# Patient Record
Sex: FEMALE | Race: WHITE | NOT HISPANIC OR LATINO | URBAN - METROPOLITAN AREA
[De-identification: names, ages, dates, MRNs, and addresses within clinical notes are randomized per-mention and may not be internally consistent; named-entity substitution may affect disease eponyms.]

---

## 2020-05-23 LAB
ALBUMIN SERPL-MCNC: 4.8 G/DL (ref 3.8–4.9)
ALBUMIN/GLOB SERPL: 2.1 {RATIO} (ref 1.2–2.2)
ALP SERPL-CCNC: 73 IU/L (ref 39–117)
ALT SERPL-CCNC: 19 IU/L (ref 0–32)
AST SERPL-CCNC: 19 IU/L (ref 0–40)
BILIRUB SERPL-MCNC: 0.3 MG/DL (ref 0–1.2)
BUN SERPL-MCNC: 10 MG/DL (ref 6–24)
BUN/CREAT SERPL: 14 (ref 9–23)
CALCIUM SERPL-MCNC: 9.3 MG/DL (ref 8.7–10.2)
CHLORIDE SERPL-SCNC: 101 MMOL/L (ref 96–106)
CHOLEST SERPL-MCNC: 167 MG/DL (ref 100–199)
CO2 SERPL-SCNC: 26 MMOL/L (ref 20–29)
CREAT SERPL-MCNC: 0.72 MG/DL (ref 0.57–1)
GLOBULIN SER-MCNC: 2.3 G/DL (ref 1.5–4.5)
GLUCOSE SERPL-MCNC: 96 MG/DL (ref 65–99)
HBA1C MFR BLD: 5.5 % (ref 4.8–5.6)
HBV SURFACE AB SER-ACNC: 4.4 MIU/ML
HDLC SERPL-MCNC: 63 MG/DL
LDLC SERPL CALC-MCNC: 87 MG/DL (ref 0–99)
POTASSIUM SERPL-SCNC: 4 MMOL/L (ref 3.5–5.2)
PROT SERPL-MCNC: 7.1 G/DL (ref 6–8.5)
SL AMB EGFR AFRICAN AMERICAN: 108 ML/MIN/1.73
SL AMB EGFR NON AFRICAN AMERICAN: 94 ML/MIN/1.73
SL AMB VLDL CHOLESTEROL CALC: 17 MG/DL (ref 5–40)
SODIUM SERPL-SCNC: 144 MMOL/L (ref 134–144)
TRIGL SERPL-MCNC: 84 MG/DL (ref 0–149)

## 2020-05-27 ENCOUNTER — TELEMEDICINE (OUTPATIENT)
Dept: FAMILY MEDICINE CLINIC | Facility: CLINIC | Age: 55
End: 2020-05-27
Payer: COMMERCIAL

## 2020-05-27 DIAGNOSIS — E55.9 VITAMIN D DEFICIENCY: ICD-10-CM

## 2020-05-27 DIAGNOSIS — R73.01 IMPAIRED FASTING GLUCOSE: ICD-10-CM

## 2020-05-27 DIAGNOSIS — Z28.39 IMMUNIZATION DEFICIENCY: ICD-10-CM

## 2020-05-27 DIAGNOSIS — E78.2 MIXED HYPERLIPIDEMIA: Primary | ICD-10-CM

## 2020-05-27 PROBLEM — Z92.89 HISTORY OF MAMMOGRAM: Status: ACTIVE | Noted: 2018-02-01

## 2020-05-27 PROCEDURE — 99212 OFFICE O/P EST SF 10 MIN: CPT | Performed by: FAMILY MEDICINE

## 2020-05-27 RX ORDER — ROSUVASTATIN CALCIUM 20 MG/1
10 TABLET, COATED ORAL
COMMUNITY
Start: 2020-03-30 | End: 2021-01-11

## 2020-11-12 LAB
25(OH)D3+25(OH)D2 SERPL-MCNC: 40.3 NG/ML (ref 30–100)
ALBUMIN SERPL-MCNC: 4.7 G/DL (ref 3.8–4.9)
ALBUMIN/GLOB SERPL: 2 {RATIO} (ref 1.2–2.2)
ALP SERPL-CCNC: 78 IU/L (ref 39–117)
ALT SERPL-CCNC: 15 IU/L (ref 0–32)
AST SERPL-CCNC: 17 IU/L (ref 0–40)
BASOPHILS # BLD AUTO: 0 X10E3/UL (ref 0–0.2)
BASOPHILS NFR BLD AUTO: 0 %
BILIRUB SERPL-MCNC: 0.3 MG/DL (ref 0–1.2)
BUN SERPL-MCNC: 13 MG/DL (ref 6–24)
BUN/CREAT SERPL: 20 (ref 9–23)
CALCIUM SERPL-MCNC: 9.4 MG/DL (ref 8.7–10.2)
CHLORIDE SERPL-SCNC: 102 MMOL/L (ref 96–106)
CHOLEST SERPL-MCNC: 160 MG/DL (ref 100–199)
CO2 SERPL-SCNC: 26 MMOL/L (ref 20–29)
CREAT SERPL-MCNC: 0.66 MG/DL (ref 0.57–1)
EOSINOPHIL # BLD AUTO: 0.1 X10E3/UL (ref 0–0.4)
EOSINOPHIL NFR BLD AUTO: 1 %
ERYTHROCYTE [DISTWIDTH] IN BLOOD BY AUTOMATED COUNT: 12.5 % (ref 11.7–15.4)
EST. AVERAGE GLUCOSE BLD GHB EST-MCNC: 120 MG/DL
GLOBULIN SER-MCNC: 2.3 G/DL (ref 1.5–4.5)
GLUCOSE SERPL-MCNC: 92 MG/DL (ref 65–99)
HBA1C MFR BLD: 5.8 % (ref 4.8–5.6)
HCT VFR BLD AUTO: 39 % (ref 34–46.6)
HDLC SERPL-MCNC: 60 MG/DL
HGB BLD-MCNC: 13.1 G/DL (ref 11.1–15.9)
IMM GRANULOCYTES # BLD: 0 X10E3/UL (ref 0–0.1)
IMM GRANULOCYTES NFR BLD: 0 %
LDLC SERPL CALC-MCNC: 85 MG/DL (ref 0–99)
LYMPHOCYTES # BLD AUTO: 2.4 X10E3/UL (ref 0.7–3.1)
LYMPHOCYTES NFR BLD AUTO: 33 %
MCH RBC QN AUTO: 30.1 PG (ref 26.6–33)
MCHC RBC AUTO-ENTMCNC: 33.6 G/DL (ref 31.5–35.7)
MCV RBC AUTO: 90 FL (ref 79–97)
MONOCYTES # BLD AUTO: 0.4 X10E3/UL (ref 0.1–0.9)
MONOCYTES NFR BLD AUTO: 5 %
NEUTROPHILS # BLD AUTO: 4.4 X10E3/UL (ref 1.4–7)
NEUTROPHILS NFR BLD AUTO: 61 %
PLATELET # BLD AUTO: 168 X10E3/UL (ref 150–450)
POTASSIUM SERPL-SCNC: 4.2 MMOL/L (ref 3.5–5.2)
PROT SERPL-MCNC: 7 G/DL (ref 6–8.5)
RBC # BLD AUTO: 4.35 X10E6/UL (ref 3.77–5.28)
SL AMB EGFR AFRICAN AMERICAN: 113 ML/MIN/1.73
SL AMB EGFR NON AFRICAN AMERICAN: 98 ML/MIN/1.73
SODIUM SERPL-SCNC: 141 MMOL/L (ref 134–144)
TRIGL SERPL-MCNC: 81 MG/DL (ref 0–149)
TSH SERPL DL<=0.005 MIU/L-ACNC: 2.12 UIU/ML (ref 0.45–4.5)
WBC # BLD AUTO: 7.2 X10E3/UL (ref 3.4–10.8)

## 2020-11-23 ENCOUNTER — TELEMEDICINE (OUTPATIENT)
Dept: FAMILY MEDICINE CLINIC | Facility: CLINIC | Age: 55
End: 2020-11-23
Payer: COMMERCIAL

## 2020-11-23 VITALS — WEIGHT: 156 LBS | HEIGHT: 65 IN | BODY MASS INDEX: 25.99 KG/M2

## 2020-11-23 DIAGNOSIS — R73.01 IMPAIRED FASTING GLUCOSE: Primary | ICD-10-CM

## 2020-11-23 DIAGNOSIS — R73.03 PREDIABETES: ICD-10-CM

## 2020-11-23 DIAGNOSIS — Z12.31 ENCOUNTER FOR SCREENING MAMMOGRAM FOR BREAST CANCER: ICD-10-CM

## 2020-11-23 DIAGNOSIS — Z11.59 NEED FOR HEPATITIS C SCREENING TEST: ICD-10-CM

## 2020-11-23 DIAGNOSIS — E53.8 CYANOCOBALAMIN DEFICIENCY: ICD-10-CM

## 2020-11-23 DIAGNOSIS — E78.2 MIXED HYPERLIPIDEMIA: ICD-10-CM

## 2020-11-23 DIAGNOSIS — Z11.4 SCREENING FOR HIV (HUMAN IMMUNODEFICIENCY VIRUS): ICD-10-CM

## 2020-11-23 DIAGNOSIS — E55.9 VITAMIN D DEFICIENCY: ICD-10-CM

## 2020-11-23 PROCEDURE — 1036F TOBACCO NON-USER: CPT | Performed by: FAMILY MEDICINE

## 2020-11-23 PROCEDURE — 3008F BODY MASS INDEX DOCD: CPT | Performed by: FAMILY MEDICINE

## 2020-11-23 PROCEDURE — 99213 OFFICE O/P EST LOW 20 MIN: CPT | Performed by: FAMILY MEDICINE

## 2021-01-09 DIAGNOSIS — E78.2 MIXED HYPERLIPIDEMIA: Primary | ICD-10-CM

## 2021-01-11 RX ORDER — ROSUVASTATIN CALCIUM 20 MG/1
TABLET, COATED ORAL
Qty: 90 TABLET | Refills: 3 | Status: SHIPPED | OUTPATIENT
Start: 2021-01-11 | End: 2022-01-31

## 2021-02-26 ENCOUNTER — IMMUNIZATIONS (OUTPATIENT)
Dept: FAMILY MEDICINE CLINIC | Facility: HOSPITAL | Age: 56
End: 2021-02-26

## 2021-02-26 DIAGNOSIS — Z23 ENCOUNTER FOR IMMUNIZATION: Primary | ICD-10-CM

## 2021-02-26 PROCEDURE — 0001A SARS-COV-2 / COVID-19 MRNA VACCINE (PFIZER-BIONTECH) 30 MCG: CPT

## 2021-02-26 PROCEDURE — 91300 SARS-COV-2 / COVID-19 MRNA VACCINE (PFIZER-BIONTECH) 30 MCG: CPT

## 2021-03-19 ENCOUNTER — IMMUNIZATIONS (OUTPATIENT)
Dept: FAMILY MEDICINE CLINIC | Facility: HOSPITAL | Age: 56
End: 2021-03-19

## 2021-03-19 DIAGNOSIS — Z23 ENCOUNTER FOR IMMUNIZATION: Primary | ICD-10-CM

## 2021-03-19 PROCEDURE — 91300 SARS-COV-2 / COVID-19 MRNA VACCINE (PFIZER-BIONTECH) 30 MCG: CPT

## 2021-03-19 PROCEDURE — 0002A SARS-COV-2 / COVID-19 MRNA VACCINE (PFIZER-BIONTECH) 30 MCG: CPT

## 2021-04-26 ENCOUNTER — OFFICE VISIT (OUTPATIENT)
Dept: FAMILY MEDICINE CLINIC | Facility: CLINIC | Age: 56
End: 2021-04-26
Payer: COMMERCIAL

## 2021-04-26 VITALS
HEIGHT: 65 IN | HEART RATE: 75 BPM | OXYGEN SATURATION: 98 % | TEMPERATURE: 97.8 F | SYSTOLIC BLOOD PRESSURE: 114 MMHG | RESPIRATION RATE: 12 BRPM | DIASTOLIC BLOOD PRESSURE: 56 MMHG | BODY MASS INDEX: 26.96 KG/M2 | WEIGHT: 161.8 LBS

## 2021-04-26 DIAGNOSIS — L23.7 ALLERGIC CONTACT DERMATITIS DUE TO PLANTS, EXCEPT FOOD: ICD-10-CM

## 2021-04-26 DIAGNOSIS — R21 RASH AND NONSPECIFIC SKIN ERUPTION: Primary | ICD-10-CM

## 2021-04-26 PROCEDURE — 99214 OFFICE O/P EST MOD 30 MIN: CPT | Performed by: FAMILY MEDICINE

## 2021-04-26 RX ORDER — METHYLPREDNISOLONE ACETATE 40 MG/ML
40 INJECTION, SUSPENSION INTRA-ARTICULAR; INTRALESIONAL; INTRAMUSCULAR; SOFT TISSUE ONCE
Status: COMPLETED | OUTPATIENT
Start: 2021-04-26 | End: 2021-04-26

## 2021-04-26 RX ORDER — DOXYCYCLINE HYCLATE 100 MG/1
100 CAPSULE ORAL EVERY 12 HOURS SCHEDULED
Qty: 20 CAPSULE | Refills: 0 | Status: SHIPPED | OUTPATIENT
Start: 2021-04-26 | End: 2021-05-06

## 2021-04-26 RX ORDER — PREDNISONE 20 MG/1
TABLET ORAL
Qty: 15 TABLET | Refills: 0 | Status: SHIPPED | OUTPATIENT
Start: 2021-04-26 | End: 2022-03-16

## 2021-04-26 RX ORDER — LORATADINE 10 MG/1
20 TABLET ORAL DAILY
Qty: 20 TABLET | Refills: 0 | Status: SHIPPED | OUTPATIENT
Start: 2021-04-26 | End: 2022-03-16

## 2021-04-26 RX ORDER — HYDROXYZINE HYDROCHLORIDE 10 MG/1
10 TABLET, FILM COATED ORAL EVERY 6 HOURS PRN
Qty: 30 TABLET | Refills: 0 | Status: SHIPPED | OUTPATIENT
Start: 2021-04-26 | End: 2022-03-16

## 2021-04-26 RX ADMIN — METHYLPREDNISOLONE ACETATE 40 MG: 40 INJECTION, SUSPENSION INTRA-ARTICULAR; INTRALESIONAL; INTRAMUSCULAR; SOFT TISSUE at 17:02

## 2021-04-26 NOTE — PROGRESS NOTES
Assessment/Plan:  Concern for large rash rapid onset on her neck in her face  Depo-Medrol injection 40 mg IM in the office done today will put her on oral prednisone and oral antibiotic with Silvadene and anti itching medication for her anti allergy for her  Close monitor needed will see her back in 4 days for follow-up  She will need hep B vaccine and shingles vaccine in the future  I have spent 25 minutes with Patient  today in which greater than 50% of this time was spent in counseling/coordination of care regarding Risks and benefits of tx options  Problem List Items Addressed This Visit        Musculoskeletal and Integument    Rash and nonspecific skin eruption - Primary    Relevant Medications    predniSONE 20 mg tablet    doxycycline hyclate (VIBRAMYCIN) 100 mg capsule    hydrOXYzine HCL (ATARAX) 10 mg tablet    loratadine (CLARITIN) 10 mg tablet    silver sulfadiazine (SILVADENE,SSD) 1 % cream    Allergic contact dermatitis due to plants, except food    Relevant Medications    predniSONE 20 mg tablet    hydrOXYzine HCL (ATARAX) 10 mg tablet    loratadine (CLARITIN) 10 mg tablet    silver sulfadiazine (SILVADENE,SSD) 1 % cream            Subjective:      Patient ID: Brisa Arias is a 60 y o  female  77-year-old female presenting complaint of 3 days history of rash on her neck and her face and spreading rapidly  Three days ago she was doing mulch outside gardening for couple minutes  At night she noticed a rash on her face and her neck  Very itching and spread fast   She tried Benadryl with no relief  Couple years ago she was allergic to poison ivy  No known contacts  No family member was the same thing  Denies any fever chills no joint pain  She had COVID vaccine done 1 month ago  No new medication no new products  Rash  This is a new problem  The current episode started in the past 7 days  The problem has been rapidly worsening since onset   The affected locations include the face and neck  The rash is characterized by blistering, burning, redness, swelling and itchiness  She was exposed to plant contact  Pertinent negatives include no anorexia, congestion, cough, diarrhea, facial edema, fatigue, fever, joint pain, nail changes, rhinorrhea, shortness of breath, sore throat or vomiting  Past treatments include antihistamine, anti-itch cream and cold compress  The treatment provided no relief  Her past medical history is significant for allergies  The following portions of the patient's history were reviewed and updated as appropriate:   Past Medical History:  She has a past medical history of Allergic contact dermatitis due to plants, except food (4/26/2021), BMI 25 0-25 9,adult (11/23/2020), GERD (gastroesophageal reflux disease), Heart palpitations, History of breast cancer (2008), History of mammogram (02/01/2018), Hyperlipidemia, Immunization deficiency (5/27/2020), Impaired fasting glucose (5/27/2020), Prediabetes (11/23/2020), Rash and nonspecific skin eruption (4/26/2021), and Vitamin D deficiency (5/27/2020)  ,  _______________________________________________________________________  Medical Problems:  does not have any pertinent problems on file ,  _______________________________________________________________________  Past Surgical History:   has a past surgical history that includes ENDOSCOPY OF POUCH (06/24/2019); Colonoscopy (01/01/2014); and Breast lumpectomy (01/01/2008)  ,  _______________________________________________________________________  Family History:  family history includes Breast cancer in her maternal aunt; Esophageal cancer in her maternal uncle; Heart disease in her brother and mother; Hyperlipidemia in her father and mother; Hypertension in her mother; Liver disease in her sister; Skin cancer in her father ,  _______________________________________________________________________  Social History:   reports that she has never smoked   She has never used smokeless tobacco  She reports current alcohol use  She reports that she does not use drugs  ,  _______________________________________________________________________  Allergies:  has No Known Allergies     _______________________________________________________________________  Current Outpatient Medications   Medication Sig Dispense Refill    rosuvastatin (CRESTOR) 20 MG tablet TAKE 1 TABLET BY MOUTH  DAILY (Patient taking differently: Take 10 mg by mouth daily ) 90 tablet 3    VITAMIN D PO Take by mouth Take once weekly      doxycycline hyclate (VIBRAMYCIN) 100 mg capsule Take 1 capsule (100 mg total) by mouth every 12 (twelve) hours for 10 days 20 capsule 0    hydrOXYzine HCL (ATARAX) 10 mg tablet Take 1 tablet (10 mg total) by mouth every 6 (six) hours as needed for itching 30 tablet 0    loratadine (CLARITIN) 10 mg tablet Take 2 tablets (20 mg total) by mouth daily 20 tablet 0    predniSONE 20 mg tablet 40 mg daily x 5 days, 20 mg daily x 5 days 15 tablet 0    silver sulfadiazine (SILVADENE,SSD) 1 % cream Apply topically 2 (two) times a day 400 g 0     No current facility-administered medications for this visit       _______________________________________________________________________  Review of Systems   Constitutional: Negative for activity change, appetite change, fatigue, fever and unexpected weight change  HENT: Negative for congestion, ear pain, rhinorrhea, sore throat, trouble swallowing and voice change  Eyes: Negative for photophobia and visual disturbance  Respiratory: Negative for cough, chest tightness, shortness of breath and wheezing  Cardiovascular: Negative for chest pain, palpitations and leg swelling  Gastrointestinal: Negative for abdominal pain, anorexia, constipation, diarrhea, nausea, rectal pain and vomiting  Endocrine: Negative for cold intolerance, polydipsia and polyuria     Genitourinary: Negative for difficulty urinating, dysuria, flank pain, menstrual problem and pelvic pain  Musculoskeletal: Negative for arthralgias, joint pain, joint swelling and myalgias  Skin: Positive for color change and rash  Negative for nail changes  Allergic/Immunologic: Negative for environmental allergies and immunocompromised state  Neurological: Negative for dizziness, weakness, numbness and headaches  Hematological: Negative for adenopathy  Does not bruise/bleed easily  Psychiatric/Behavioral: Negative for decreased concentration, dysphoric mood, self-injury, sleep disturbance and suicidal ideas  The patient is not nervous/anxious  Objective:  Vitals:    04/26/21 1303   BP: 114/56   Pulse: 75   Resp: 12   Temp: 97 8 °F (36 6 °C)   TempSrc: Tympanic   SpO2: 98%   Weight: 73 4 kg (161 lb 12 8 oz)   Height: 5' 5" (1 651 m)     Body mass index is 26 92 kg/m²  Physical Exam  Vitals signs and nursing note reviewed  Constitutional:       Appearance: Normal appearance  She is well-developed and normal weight  HENT:      Head: Normocephalic and atraumatic  Right Ear: Tympanic membrane normal       Left Ear: Tympanic membrane normal       Nose: Nose normal       Mouth/Throat:      Mouth: Mucous membranes are dry  Pharynx: Oropharynx is clear  No oropharyngeal exudate  Eyes:      Pupils: Pupils are equal, round, and reactive to light  Neck:      Musculoskeletal: Normal range of motion and neck supple  Thyroid: No thyromegaly  Cardiovascular:      Rate and Rhythm: Normal rate and regular rhythm  Pulses: Normal pulses  Heart sounds: Normal heart sounds  No murmur  Pulmonary:      Effort: Pulmonary effort is normal  No respiratory distress  Breath sounds: Normal breath sounds  No wheezing or rales  Abdominal:      General: Bowel sounds are normal  There is no distension  Palpations: Abdomen is soft  Tenderness: There is no abdominal tenderness  There is no guarding     Genitourinary:     Vagina: Normal    Musculoskeletal: Normal range of motion  General: No tenderness  Skin:     General: Skin is warm and dry  Findings: Lesion and rash present  Comments: Rash=erythema raised w small blister on left side face and left neck, small patches on right neck/face, warthm to touch, pruritic   Neurological:      Mental Status: She is alert and oriented to person, place, and time  Psychiatric:         Behavior: Behavior normal          Thought Content:  Thought content normal          Judgment: Judgment normal

## 2021-04-30 ENCOUNTER — OFFICE VISIT (OUTPATIENT)
Dept: FAMILY MEDICINE CLINIC | Facility: CLINIC | Age: 56
End: 2021-04-30
Payer: COMMERCIAL

## 2021-04-30 VITALS
HEART RATE: 79 BPM | BODY MASS INDEX: 26.99 KG/M2 | DIASTOLIC BLOOD PRESSURE: 68 MMHG | TEMPERATURE: 97.3 F | OXYGEN SATURATION: 96 % | WEIGHT: 162 LBS | SYSTOLIC BLOOD PRESSURE: 120 MMHG | RESPIRATION RATE: 20 BRPM | HEIGHT: 65 IN

## 2021-04-30 DIAGNOSIS — R21 RASH AND NONSPECIFIC SKIN ERUPTION: Primary | ICD-10-CM

## 2021-04-30 PROCEDURE — 3008F BODY MASS INDEX DOCD: CPT | Performed by: FAMILY MEDICINE

## 2021-04-30 PROCEDURE — 1036F TOBACCO NON-USER: CPT | Performed by: FAMILY MEDICINE

## 2021-04-30 PROCEDURE — 99213 OFFICE O/P EST LOW 20 MIN: CPT | Performed by: FAMILY MEDICINE

## 2021-04-30 NOTE — PROGRESS NOTES
Assessment/Plan:    Patient still on prednisone doxycycline oral Silvadene topical and Atarax for itching and loratadine  If she feeling much better rash improved a lot  Itching is gone  No side effect medication  Patient to finish up her treatment then follow-up repeat as needed  Advised for sun block usage         Problem List Items Addressed This Visit        Musculoskeletal and Integument    Rash and nonspecific skin eruption - Primary            Subjective:      Patient ID: Brisa Arias is a 60 y o  female  Rash  This is a new problem  The current episode started in the past 7 days  The problem has been rapidly improving since onset  The affected locations include the face and neck  The rash is characterized by redness, swelling and itchiness  She was exposed to plant contact  Pertinent negatives include no cough, diarrhea, fatigue, shortness of breath, sore throat or vomiting  Past treatments include antihistamine, anti-itch cream, oral steroids, cold compress, moisturizer and antibiotics  The treatment provided significant relief  There is no history of allergies, asthma, eczema or varicella  The following portions of the patient's history were reviewed and updated as appropriate:   Past Medical History:  She has a past medical history of Allergic contact dermatitis due to plants, except food (4/26/2021), BMI 25 0-25 9,adult (11/23/2020), GERD (gastroesophageal reflux disease), Heart palpitations, History of breast cancer (2008), History of mammogram (02/01/2018), Hyperlipidemia, Immunization deficiency (5/27/2020), Impaired fasting glucose (5/27/2020), Prediabetes (11/23/2020), Rash and nonspecific skin eruption (4/26/2021), and Vitamin D deficiency (5/27/2020)  ,  _______________________________________________________________________  Medical Problems:  does not have any pertinent problems on file ,  _______________________________________________________________________  Past Surgical History:   has a past surgical history that includes ENDOSCOPY OF POUCH (06/24/2019); Colonoscopy (01/01/2014); and Breast lumpectomy (01/01/2008)  ,  _______________________________________________________________________  Family History:  family history includes Breast cancer in her maternal aunt; Esophageal cancer in her maternal uncle; Heart disease in her brother and mother; Hyperlipidemia in her father and mother; Hypertension in her mother; Liver disease in her sister; Skin cancer in her father ,  _______________________________________________________________________  Social History:   reports that she has never smoked  She has never used smokeless tobacco  She reports current alcohol use  She reports that she does not use drugs  ,  _______________________________________________________________________  Allergies:  has No Known Allergies     _______________________________________________________________________  Current Outpatient Medications   Medication Sig Dispense Refill    doxycycline hyclate (VIBRAMYCIN) 100 mg capsule Take 1 capsule (100 mg total) by mouth every 12 (twelve) hours for 10 days 20 capsule 0    hydrOXYzine HCL (ATARAX) 10 mg tablet Take 1 tablet (10 mg total) by mouth every 6 (six) hours as needed for itching 30 tablet 0    loratadine (CLARITIN) 10 mg tablet Take 2 tablets (20 mg total) by mouth daily 20 tablet 0    predniSONE 20 mg tablet 40 mg daily x 5 days, 20 mg daily x 5 days 15 tablet 0    rosuvastatin (CRESTOR) 20 MG tablet TAKE 1 TABLET BY MOUTH  DAILY (Patient taking differently: Take 10 mg by mouth daily ) 90 tablet 3    silver sulfadiazine (SILVADENE,SSD) 1 % cream Apply topically 2 (two) times a day 400 g 0    VITAMIN D PO Take by mouth Take once weekly       No current facility-administered medications for this visit       _______________________________________________________________________  Review of Systems   Constitutional: Negative for activity change, appetite change, fatigue and unexpected weight change  HENT: Negative for ear pain, sore throat, trouble swallowing and voice change  Eyes: Negative for photophobia and visual disturbance  Respiratory: Negative for cough, chest tightness, shortness of breath and wheezing  Cardiovascular: Negative for chest pain, palpitations and leg swelling  Gastrointestinal: Negative for abdominal pain, constipation, diarrhea, nausea, rectal pain and vomiting  Endocrine: Negative for cold intolerance, polydipsia and polyuria  Genitourinary: Negative for difficulty urinating, dysuria, flank pain, menstrual problem and pelvic pain  Musculoskeletal: Negative for arthralgias, joint swelling and myalgias  Skin: Positive for rash  Negative for color change  Allergic/Immunologic: Negative for environmental allergies and immunocompromised state  Neurological: Negative for dizziness, weakness, numbness and headaches  Hematological: Negative for adenopathy  Does not bruise/bleed easily  Psychiatric/Behavioral: Negative for decreased concentration, dysphoric mood, self-injury, sleep disturbance and suicidal ideas  The patient is not nervous/anxious  Objective:  Vitals:    04/30/21 1517   BP: 120/68   Pulse: 79   Resp: 20   Temp: (!) 97 3 °F (36 3 °C)   SpO2: 96%   Weight: 73 5 kg (162 lb)   Height: 5' 5" (1 651 m)     Body mass index is 26 96 kg/m²  Physical Exam  Vitals signs and nursing note reviewed  Constitutional:       Appearance: Normal appearance  She is normal weight  Skin:     General: Skin is warm and dry  Capillary Refill: Capillary refill takes less than 2 seconds  Findings: Rash present  Comments: Rash on left side of cheek/neck much improved=75%   Neurological:      Mental Status: She is alert and oriented to person, place, and time  Psychiatric:         Mood and Affect: Mood normal          Behavior: Behavior normal          Thought Content:  Thought content normal  Judgment: Judgment normal

## 2021-06-07 ENCOUNTER — APPOINTMENT (OUTPATIENT)
Dept: LAB | Facility: AMBULARY SURGERY CENTER | Age: 56
End: 2021-06-07
Payer: COMMERCIAL

## 2021-06-07 ENCOUNTER — TELEPHONE (OUTPATIENT)
Dept: FAMILY MEDICINE CLINIC | Facility: CLINIC | Age: 56
End: 2021-06-07

## 2021-06-07 DIAGNOSIS — E53.8 CYANOCOBALAMIN DEFICIENCY: ICD-10-CM

## 2021-06-07 DIAGNOSIS — R73.03 PREDIABETES: ICD-10-CM

## 2021-06-07 DIAGNOSIS — E78.2 MIXED HYPERLIPIDEMIA: ICD-10-CM

## 2021-06-07 DIAGNOSIS — Z11.4 SCREENING FOR HIV (HUMAN IMMUNODEFICIENCY VIRUS): ICD-10-CM

## 2021-06-07 DIAGNOSIS — E55.9 VITAMIN D DEFICIENCY: ICD-10-CM

## 2021-06-07 DIAGNOSIS — Z11.59 NEED FOR HEPATITIS C SCREENING TEST: ICD-10-CM

## 2021-06-07 LAB
25(OH)D3 SERPL-MCNC: 35.3 NG/ML (ref 30–100)
ALBUMIN SERPL BCP-MCNC: 4.1 G/DL (ref 3.5–5)
ALP SERPL-CCNC: 74 U/L (ref 46–116)
ALT SERPL W P-5'-P-CCNC: 25 U/L (ref 12–78)
ANION GAP SERPL CALCULATED.3IONS-SCNC: 6 MMOL/L (ref 4–13)
AST SERPL W P-5'-P-CCNC: 17 U/L (ref 5–45)
BILIRUB SERPL-MCNC: 0.4 MG/DL (ref 0.2–1)
BUN SERPL-MCNC: 12 MG/DL (ref 5–25)
CALCIUM SERPL-MCNC: 9 MG/DL (ref 8.3–10.1)
CHLORIDE SERPL-SCNC: 109 MMOL/L (ref 100–108)
CHOLEST SERPL-MCNC: 170 MG/DL (ref 50–200)
CO2 SERPL-SCNC: 27 MMOL/L (ref 21–32)
CREAT SERPL-MCNC: 0.63 MG/DL (ref 0.6–1.3)
EST. AVERAGE GLUCOSE BLD GHB EST-MCNC: 120 MG/DL
GFR SERPL CREATININE-BSD FRML MDRD: 100 ML/MIN/1.73SQ M
GLUCOSE P FAST SERPL-MCNC: 92 MG/DL (ref 65–99)
HBA1C MFR BLD: 5.8 %
HDLC SERPL-MCNC: 58 MG/DL
LDLC SERPL CALC-MCNC: 92 MG/DL (ref 0–100)
POTASSIUM SERPL-SCNC: 3.8 MMOL/L (ref 3.5–5.3)
PROT SERPL-MCNC: 7.5 G/DL (ref 6.4–8.2)
SODIUM SERPL-SCNC: 142 MMOL/L (ref 136–145)
TRIGL SERPL-MCNC: 98 MG/DL
VIT B12 SERPL-MCNC: 472 PG/ML (ref 100–900)

## 2021-06-07 PROCEDURE — 36415 COLL VENOUS BLD VENIPUNCTURE: CPT

## 2021-06-07 PROCEDURE — 80061 LIPID PANEL: CPT

## 2021-06-07 PROCEDURE — 86803 HEPATITIS C AB TEST: CPT

## 2021-06-07 PROCEDURE — 82306 VITAMIN D 25 HYDROXY: CPT

## 2021-06-07 PROCEDURE — 83036 HEMOGLOBIN GLYCOSYLATED A1C: CPT

## 2021-06-07 PROCEDURE — 87389 HIV-1 AG W/HIV-1&-2 AB AG IA: CPT

## 2021-06-07 PROCEDURE — 82607 VITAMIN B-12: CPT

## 2021-06-07 PROCEDURE — 80053 COMPREHEN METABOLIC PANEL: CPT

## 2021-06-07 NOTE — TELEPHONE ENCOUNTER
Patient went for bloodwork this morning at the lab in the specialty building at [facility]  ---she was concerned about the hiv and hep labs  ----the girls drawing the blood seemed confused --not familiar with the test patient was concerned something wrong?   ---please advise thank you

## 2021-06-08 LAB
HCV AB S/CO SERPL IA: <0.1 S/CO RATIO (ref 0–0.9)
HIV 1+2 AB+HIV1 P24 AG SERPL QL IA: NORMAL
SL AMB INTERPRETATION: NORMAL

## 2022-01-30 DIAGNOSIS — E78.2 MIXED HYPERLIPIDEMIA: ICD-10-CM

## 2022-01-31 RX ORDER — ROSUVASTATIN CALCIUM 20 MG/1
10 TABLET, COATED ORAL DAILY
Qty: 45 TABLET | Refills: 1 | Status: SHIPPED | OUTPATIENT
Start: 2022-01-31 | End: 2022-05-01

## 2022-03-09 ENCOUNTER — RA CDI HCC (OUTPATIENT)
Dept: OTHER | Facility: HOSPITAL | Age: 57
End: 2022-03-09

## 2022-03-09 NOTE — PROGRESS NOTES
[address]  coding opportunities       Chart reviewed, no opportunity found: CHART REVIEWED, NO OPPORTUNITY FOUND                        Patients insurance company: [employer] (Medicare and Commercial for [employer] and [employer])           Please review/recertify the BPA diagnoses for 2022      If this is correct, please assess and document during your next visit, March 16

## 2022-03-16 ENCOUNTER — OFFICE VISIT (OUTPATIENT)
Dept: FAMILY MEDICINE CLINIC | Facility: CLINIC | Age: 57
End: 2022-03-16
Payer: COMMERCIAL

## 2022-03-16 VITALS
HEIGHT: 65 IN | TEMPERATURE: 98 F | RESPIRATION RATE: 18 BRPM | OXYGEN SATURATION: 97 % | HEART RATE: 82 BPM | BODY MASS INDEX: 27.22 KG/M2 | SYSTOLIC BLOOD PRESSURE: 118 MMHG | DIASTOLIC BLOOD PRESSURE: 68 MMHG | WEIGHT: 163.4 LBS

## 2022-03-16 DIAGNOSIS — E55.9 VITAMIN D DEFICIENCY: ICD-10-CM

## 2022-03-16 DIAGNOSIS — E78.2 MIXED HYPERLIPIDEMIA: ICD-10-CM

## 2022-03-16 DIAGNOSIS — E51.9 THIAMINE DEFICIENCY: ICD-10-CM

## 2022-03-16 DIAGNOSIS — E54 ASCORBIC ACID DEFICIENCY: ICD-10-CM

## 2022-03-16 DIAGNOSIS — R42 DIZZY: ICD-10-CM

## 2022-03-16 DIAGNOSIS — R73.03 PREDIABETES: ICD-10-CM

## 2022-03-16 DIAGNOSIS — E53.8 CYANOCOBALAMIN DEFICIENCY: ICD-10-CM

## 2022-03-16 DIAGNOSIS — Z85.3 HISTORY OF BREAST CANCER: Primary | ICD-10-CM

## 2022-03-16 DIAGNOSIS — Z00.00 ANNUAL PHYSICAL EXAM: ICD-10-CM

## 2022-03-16 DIAGNOSIS — E53.1 PYRIDOXINE DEFICIENCY: ICD-10-CM

## 2022-03-16 PROCEDURE — 3725F SCREEN DEPRESSION PERFORMED: CPT | Performed by: FAMILY MEDICINE

## 2022-03-16 PROCEDURE — 99396 PREV VISIT EST AGE 40-64: CPT | Performed by: FAMILY MEDICINE

## 2022-03-16 NOTE — PROGRESS NOTES
ADULT ANNUAL [address], [address] [facility]    NAME: Gwen Sadler  AGE: 60 y o  SEX: female  : 1965     DATE: 3/16/2022     Assessment and Plan:     Pt works at [employer]   Advised shingle vaccine next visit and nurse visit  Last blood work results show B12 was low vitamin D also low A1c is in prediabetic range 5 8 LDL is 92  She is on rosuvastatin 10 mg daily  Colonoscopy is up-to-date  next 2028  She follow-up with gyn has right breast cancer diagnosed 13 years ago she finished with tamoxifen now she had mammogram gyn Pap smear with gyn up-to-date  She is up to date w covid vaccine  She complained of feeling dizzy the past couple months off and on  Her blood pressure is stable in the office  Will need to follow-up blood test to check her sugar    She has multiple skin lesion her body the right shoulder most suspicious for actinic keratosis recommend to come in for evaluation cryo treatment for those lesion   Problem List Items Addressed This Visit        Other    Hyperlipidemia    Relevant Orders    CBC and differential    Comprehensive metabolic panel    Lipid Panel with Direct LDL reflex    TSH, 3rd generation with Free T4 reflex    History of breast cancer - Primary    Vitamin D deficiency    Relevant Orders    Vitamin D 25 hydroxy    Prediabetes    Relevant Orders    Hemoglobin A1C    UA w Reflex to Microscopic w Reflex to Culture      Other Visit Diagnoses     Dizzy        Cyanocobalamin deficiency        Relevant Orders    Anti-parietal antibody    H  pylori antigen, stool    Celiac Disease Panel    Intrinsic factor blocking antibody    Vitamin B12    Thiamine deficiency        Relevant Orders    Vitamin B1 (Thiamine), Serum/Plasma, LC/MS/MS    Pyridoxine deficiency        Relevant Orders    Vitamin B6    Ascorbic acid deficiency        Relevant Orders    Vitamin C    BMI 27 0-27 9,adult        Annual physical exam Immunizations and preventive care screenings were discussed with patient today  Appropriate education was printed on patient's after visit summary  Counseling:  · Exercise: the importance of regular exercise/physical activity was discussed  Recommend exercise 3-5 times per week for at least 30 minutes  BMI Counseling: Body mass index is 27 19 kg/m²  The BMI is above normal  Nutrition recommendations include decreasing portion sizes, encouraging healthy choices of fruits and vegetables, limiting drinks that contain sugar and reducing intake of cholesterol  Exercise recommendations include exercising 3-5 times per week  No pharmacotherapy was ordered  Rationale for BMI follow-up plan is due to patient being overweight or obese  Depression Screening and Follow-up Plan: Patient was screened for depression during today's encounter  They screened negative with a PHQ-2 score of 0  Return for nurse visit for shingle vaccine, office visit for cryo treatment of skin=30 min  Chief Complaint:     Chief Complaint   Patient presents with    Physical Exam     yearly      History of Present Illness:     Adult Annual Physical   Patient here for a comprehensive physical exam  The patient reports problems - dizzy  Diet and Physical Activity  · Diet/Nutrition: well balanced diet  · Exercise: walking  Depression Screening  PHQ-2/9 Depression Screening    Little interest or pleasure in doing things: 0 - not at all  Feeling down, depressed, or hopeless: 0 - not at all  PHQ-2 Score: 0  PHQ-2 Interpretation: Negative depression screen       General Health  · Sleep: sleeps well  · Hearing: normal - bilateral   · Vision: no vision problems  · Dental: regular dental visits  /GYN Health  · Patient is: postmenopausal  · Last menstrual period: 5 y ago  · Contraceptive method: none       Review of Systems:     Review of Systems   Constitutional: Negative for activity change, appetite change, fatigue and unexpected weight change  HENT: Negative for ear pain, sore throat, trouble swallowing and voice change  Eyes: Negative for photophobia and visual disturbance  Respiratory: Negative for cough, chest tightness, shortness of breath and wheezing  Cardiovascular: Negative for chest pain, palpitations and leg swelling  Gastrointestinal: Negative for abdominal pain, constipation, diarrhea, nausea, rectal pain and vomiting  Endocrine: Negative for cold intolerance, polydipsia and polyuria  Genitourinary: Negative for difficulty urinating, dysuria, flank pain, menstrual problem and pelvic pain  Musculoskeletal: Negative for arthralgias, joint swelling and myalgias  Skin: Negative for color change and rash  Allergic/Immunologic: Negative for environmental allergies and immunocompromised state  Neurological: Positive for dizziness  Negative for weakness, numbness and headaches  Hematological: Negative for adenopathy  Does not bruise/bleed easily  Psychiatric/Behavioral: Negative for decreased concentration, dysphoric mood, self-injury, sleep disturbance and suicidal ideas  The patient is not nervous/anxious         Past Medical History:     Past Medical History:   Diagnosis Date    Allergic contact dermatitis due to plants, except food 4/26/2021    Allergic rhinitis     BMI 25 0-25 9,adult 11/23/2020    Ear problems     GERD (gastroesophageal reflux disease)     last egd 2/2016    Heart palpitations     History of breast cancer 2008    right breast was on tamoxifen and was done in 3/2014    History of mammogram 02/01/2018    HL (hearing loss)     Hyperlipidemia     Immunization deficiency 5/27/2020    Impaired fasting glucose 5/27/2020    Prediabetes 11/23/2020    Rash and nonspecific skin eruption 4/26/2021    Tinnitus     Vitamin D deficiency 5/27/2020    17      Past Surgical History:     Past Surgical History:   Procedure Laterality Date    BREAST LUMPECTOMY 2008    COLONOSCOPY  2014    ENDOSCOPY OF POUCH  2019      Social History:     Social History     Socioeconomic History    Marital status: /Civil Union     Spouse name: None    Number of children: None    Years of education: None    Highest education level: None   Occupational History    None   Tobacco Use    Smoking status: Never Smoker    Smokeless tobacco: Never Used   Substance and Sexual Activity    Alcohol use: Yes     Comment: ocassional     Drug use: Never    Sexual activity: None   Other Topics Concern    None   Social History Narrative    Most recent tobacco use screenin2019      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      Marital status:         Exercise level:   None      Diet:   Regular      Caffeine intake:   Occasional      Seat belts used routinely:   Yes      Smoke alarm in home:   Yes      Please enter the date of the Patient's previous mammogram :   2018      Date of last Colonoscopy:   5 years ago  is due now  Social Determinants of Health     Financial Resource Strain: Not on file   Food Insecurity: Not on file   Transportation Needs: Not on file   Physical Activity: Not on file   Stress: Not on file   Social Connections: Not on file   Intimate Partner Violence: Not on file   Housing Stability: Not on file      Family History:     Family History   Problem Relation Age of Onset    Hypertension Mother     Heart disease Mother     Hyperlipidemia Mother     Skin cancer Father         basal call carcinoma     Hyperlipidemia Father     Heart disease Brother     Breast cancer Maternal Aunt     Esophageal cancer Maternal Uncle     Liver disease Sister       Current Medications:     Current Outpatient Medications   Medication Sig Dispense Refill    rosuvastatin (CRESTOR) 20 MG tablet Take 0 5 tablets (10 mg total) by mouth daily 45 tablet 1     No current facility-administered medications for this visit        Allergies: Allergies   Allergen Reactions    Other Other (See Comments)     Environmental      Physical Exam:     /68   Pulse 82   Temp 98 °F (36 7 °C)   Resp 18   Ht 5' 5" (1 651 m)   Wt 74 1 kg (163 lb 6 4 oz)   SpO2 97%   BMI 27 19 kg/m²     Physical Exam  Vitals and nursing note reviewed  Constitutional:       General: She is not in acute distress  Appearance: Normal appearance  She is well-developed  HENT:      Head: Normocephalic and atraumatic  Right Ear: Tympanic membrane, ear canal and external ear normal       Left Ear: Tympanic membrane and external ear normal       Nose: Nose normal       Mouth/Throat:      Mouth: Mucous membranes are dry  Pharynx: Oropharynx is clear  Eyes:      Extraocular Movements: Extraocular movements intact  Conjunctiva/sclera: Conjunctivae normal       Pupils: Pupils are equal, round, and reactive to light  Cardiovascular:      Rate and Rhythm: Normal rate and regular rhythm  Pulses: Normal pulses  Heart sounds: Normal heart sounds  No murmur heard  Pulmonary:      Effort: Pulmonary effort is normal  No respiratory distress  Breath sounds: Normal breath sounds  Abdominal:      Palpations: Abdomen is soft  Tenderness: There is no abdominal tenderness  Genitourinary:     Comments: Fu w gyn=up to date w pap  Musculoskeletal:         General: Normal range of motion  Cervical back: Normal range of motion and neck supple  Skin:     General: Skin is warm and dry  Capillary Refill: Capillary refill takes less than 2 seconds  Comments: mulitiple large brown dicoloration skin lesions on back/chest of seborheic keratosis  Right shoulder raised folliculated skin lesion skin color concern for verruca like lesion    Neurological:      General: No focal deficit present  Mental Status: She is alert and oriented to person, place, and time  Mental status is at baseline     Psychiatric:         Mood and Affect: Mood normal          Behavior: Behavior normal          Thought Content:  Thought content normal          Judgment: Judgment normal           Darian Navarrete MD  [facility]

## 2022-03-16 NOTE — PATIENT INSTRUCTIONS

## 2022-03-17 ENCOUNTER — TELEPHONE (OUTPATIENT)
Dept: FAMILY MEDICINE CLINIC | Facility: CLINIC | Age: 57
End: 2022-03-17

## 2022-03-17 DIAGNOSIS — M65.221 CALCIFIC TENDONITIS OF RIGHT UPPER ARM: Primary | ICD-10-CM

## 2022-03-23 ENCOUNTER — RA CDI HCC (OUTPATIENT)
Dept: OTHER | Facility: HOSPITAL | Age: 57
End: 2022-03-23

## 2022-03-23 ENCOUNTER — APPOINTMENT (OUTPATIENT)
Dept: LAB | Facility: AMBULARY SURGERY CENTER | Age: 57
End: 2022-03-23
Payer: COMMERCIAL

## 2022-03-23 DIAGNOSIS — R73.03 PREDIABETES: ICD-10-CM

## 2022-03-23 DIAGNOSIS — E55.9 VITAMIN D DEFICIENCY: ICD-10-CM

## 2022-03-23 DIAGNOSIS — E51.9 THIAMINE DEFICIENCY: ICD-10-CM

## 2022-03-23 DIAGNOSIS — E53.1 PYRIDOXINE DEFICIENCY: ICD-10-CM

## 2022-03-23 DIAGNOSIS — E78.2 MIXED HYPERLIPIDEMIA: ICD-10-CM

## 2022-03-23 DIAGNOSIS — E53.8 CYANOCOBALAMIN DEFICIENCY: ICD-10-CM

## 2022-03-23 DIAGNOSIS — E54 ASCORBIC ACID DEFICIENCY: ICD-10-CM

## 2022-03-23 LAB
25(OH)D3 SERPL-MCNC: 29.2 NG/ML (ref 30–100)
ALBUMIN SERPL BCP-MCNC: 3.9 G/DL (ref 3.5–5)
ALP SERPL-CCNC: 79 U/L (ref 46–116)
ALT SERPL W P-5'-P-CCNC: 31 U/L (ref 12–78)
ANION GAP SERPL CALCULATED.3IONS-SCNC: 4 MMOL/L (ref 4–13)
AST SERPL W P-5'-P-CCNC: 18 U/L (ref 5–45)
BACTERIA UR QL AUTO: ABNORMAL /HPF
BASOPHILS # BLD AUTO: 0.02 THOUSANDS/ΜL (ref 0–0.1)
BASOPHILS NFR BLD AUTO: 0 % (ref 0–1)
BILIRUB SERPL-MCNC: 0.39 MG/DL (ref 0.2–1)
BILIRUB UR QL STRIP: NEGATIVE
BUN SERPL-MCNC: 12 MG/DL (ref 5–25)
CALCIUM SERPL-MCNC: 9.1 MG/DL (ref 8.3–10.1)
CHLORIDE SERPL-SCNC: 107 MMOL/L (ref 100–108)
CHOLEST SERPL-MCNC: 159 MG/DL
CLARITY UR: CLEAR
CO2 SERPL-SCNC: 27 MMOL/L (ref 21–32)
COLOR UR: ABNORMAL
CREAT SERPL-MCNC: 0.69 MG/DL (ref 0.6–1.3)
EOSINOPHIL # BLD AUTO: 0.08 THOUSAND/ΜL (ref 0–0.61)
EOSINOPHIL NFR BLD AUTO: 1 % (ref 0–6)
ERYTHROCYTE [DISTWIDTH] IN BLOOD BY AUTOMATED COUNT: 11.9 % (ref 11.6–15.1)
EST. AVERAGE GLUCOSE BLD GHB EST-MCNC: 111 MG/DL
GFR SERPL CREATININE-BSD FRML MDRD: 96 ML/MIN/1.73SQ M
GLUCOSE P FAST SERPL-MCNC: 106 MG/DL (ref 65–99)
GLUCOSE UR STRIP-MCNC: NEGATIVE MG/DL
HBA1C MFR BLD: 5.5 %
HCT VFR BLD AUTO: 37.6 % (ref 34.8–46.1)
HDLC SERPL-MCNC: 54 MG/DL
HGB BLD-MCNC: 12.9 G/DL (ref 11.5–15.4)
HGB UR QL STRIP.AUTO: NEGATIVE
IMM GRANULOCYTES # BLD AUTO: 0.01 THOUSAND/UL (ref 0–0.2)
IMM GRANULOCYTES NFR BLD AUTO: 0 % (ref 0–2)
KETONES UR STRIP-MCNC: NEGATIVE MG/DL
LDLC SERPL CALC-MCNC: 90 MG/DL (ref 0–100)
LEUKOCYTE ESTERASE UR QL STRIP: ABNORMAL
LYMPHOCYTES # BLD AUTO: 1.95 THOUSANDS/ΜL (ref 0.6–4.47)
LYMPHOCYTES NFR BLD AUTO: 35 % (ref 14–44)
MCH RBC QN AUTO: 30.4 PG (ref 26.8–34.3)
MCHC RBC AUTO-ENTMCNC: 34.3 G/DL (ref 31.4–37.4)
MCV RBC AUTO: 89 FL (ref 82–98)
MONOCYTES # BLD AUTO: 0.28 THOUSAND/ΜL (ref 0.17–1.22)
MONOCYTES NFR BLD AUTO: 5 % (ref 4–12)
NEUTROPHILS # BLD AUTO: 3.24 THOUSANDS/ΜL (ref 1.85–7.62)
NEUTS SEG NFR BLD AUTO: 59 % (ref 43–75)
NITRITE UR QL STRIP: NEGATIVE
NON-SQ EPI CELLS URNS QL MICRO: ABNORMAL /HPF
NRBC BLD AUTO-RTO: 0 /100 WBCS
PH UR STRIP.AUTO: 5.5 [PH]
PLATELET # BLD AUTO: 168 THOUSANDS/UL (ref 149–390)
PMV BLD AUTO: 11.7 FL (ref 8.9–12.7)
POTASSIUM SERPL-SCNC: 4 MMOL/L (ref 3.5–5.3)
PROT SERPL-MCNC: 7.2 G/DL (ref 6.4–8.2)
PROT UR STRIP-MCNC: NEGATIVE MG/DL
RBC # BLD AUTO: 4.25 MILLION/UL (ref 3.81–5.12)
RBC #/AREA URNS AUTO: ABNORMAL /HPF
SODIUM SERPL-SCNC: 138 MMOL/L (ref 136–145)
SP GR UR STRIP.AUTO: 1.02 (ref 1–1.03)
TRANS CELLS #/AREA URNS HPF: PRESENT /[HPF]
TRIGL SERPL-MCNC: 73 MG/DL
TSH SERPL DL<=0.05 MIU/L-ACNC: 1.41 UIU/ML (ref 0.36–3.74)
UROBILINOGEN UR STRIP-ACNC: <2 MG/DL
VIT B12 SERPL-MCNC: 494 PG/ML (ref 100–900)
WBC # BLD AUTO: 5.58 THOUSAND/UL (ref 4.31–10.16)
WBC #/AREA URNS AUTO: ABNORMAL /HPF

## 2022-03-23 PROCEDURE — 83036 HEMOGLOBIN GLYCOSYLATED A1C: CPT

## 2022-03-23 PROCEDURE — 83516 IMMUNOASSAY NONANTIBODY: CPT

## 2022-03-23 PROCEDURE — 80061 LIPID PANEL: CPT

## 2022-03-23 PROCEDURE — 86364 TISS TRNSGLTMNASE EA IG CLAS: CPT

## 2022-03-23 PROCEDURE — 84207 ASSAY OF VITAMIN B-6: CPT

## 2022-03-23 PROCEDURE — 84443 ASSAY THYROID STIM HORMONE: CPT

## 2022-03-23 PROCEDURE — 80053 COMPREHEN METABOLIC PANEL: CPT

## 2022-03-23 PROCEDURE — 82180 ASSAY OF ASCORBIC ACID: CPT

## 2022-03-23 PROCEDURE — 86231 EMA EACH IG CLASS: CPT

## 2022-03-23 PROCEDURE — 81001 URINALYSIS AUTO W/SCOPE: CPT | Performed by: FAMILY MEDICINE

## 2022-03-23 PROCEDURE — 82607 VITAMIN B-12: CPT

## 2022-03-23 PROCEDURE — 85025 COMPLETE CBC W/AUTO DIFF WBC: CPT

## 2022-03-23 PROCEDURE — 86258 DGP ANTIBODY EACH IG CLASS: CPT

## 2022-03-23 PROCEDURE — 86340 INTRINSIC FACTOR ANTIBODY: CPT

## 2022-03-23 PROCEDURE — 82306 VITAMIN D 25 HYDROXY: CPT

## 2022-03-23 PROCEDURE — 84425 ASSAY OF VITAMIN B-1: CPT

## 2022-03-23 PROCEDURE — 87086 URINE CULTURE/COLONY COUNT: CPT | Performed by: FAMILY MEDICINE

## 2022-03-23 PROCEDURE — 82784 ASSAY IGA/IGD/IGG/IGM EACH: CPT

## 2022-03-23 PROCEDURE — 36415 COLL VENOUS BLD VENIPUNCTURE: CPT

## 2022-03-23 NOTE — PROGRESS NOTES
[facility]  coding opportunities       Chart reviewed, no opportunity found: CHART REVIEWED, NO OPPORTUNITY FOUND        Patients Insurance        Commercial Insurance: Betito Reis

## 2022-03-24 LAB
BACTERIA UR CULT: NORMAL
ENDOMYSIUM IGA SER QL: NEGATIVE
GLIADIN PEPTIDE IGA SER-ACNC: 2 UNITS (ref 0–19)
GLIADIN PEPTIDE IGG SER-ACNC: <1 UNITS (ref 0–19)
IGA SERPL-MCNC: 147 MG/DL (ref 87–352)
PCA AB SER-ACNC: 7.1 UNITS (ref 0–20)
TTG IGA SER-ACNC: <2 U/ML (ref 0–3)
TTG IGG SER-ACNC: <2 U/ML (ref 0–5)

## 2022-03-25 ENCOUNTER — CLINICAL SUPPORT (OUTPATIENT)
Dept: FAMILY MEDICINE CLINIC | Facility: CLINIC | Age: 57
End: 2022-03-25
Payer: COMMERCIAL

## 2022-03-25 DIAGNOSIS — Z23 NEED FOR VACCINATION: Primary | ICD-10-CM

## 2022-03-25 LAB — IF BLOCK AB SER QL RIA: 1 AU/ML (ref 0–1.1)

## 2022-03-25 PROCEDURE — 90750 HZV VACC RECOMBINANT IM: CPT

## 2022-03-25 PROCEDURE — 90471 IMMUNIZATION ADMIN: CPT

## 2022-03-28 ENCOUNTER — APPOINTMENT (OUTPATIENT)
Dept: LAB | Facility: AMBULARY SURGERY CENTER | Age: 57
End: 2022-03-28
Payer: COMMERCIAL

## 2022-03-28 DIAGNOSIS — E53.8 CYANOCOBALAMIN DEFICIENCY: ICD-10-CM

## 2022-03-28 LAB — VIT C SERPL-MCNC: 1.3 MG/DL (ref 0.4–2)

## 2022-03-28 PROCEDURE — 87338 HPYLORI STOOL AG IA: CPT

## 2022-03-29 LAB — H PYLORI AG STL QL IA: NEGATIVE

## 2022-03-30 ENCOUNTER — OFFICE VISIT (OUTPATIENT)
Dept: FAMILY MEDICINE CLINIC | Facility: CLINIC | Age: 57
End: 2022-03-30
Payer: COMMERCIAL

## 2022-03-30 VITALS
WEIGHT: 163 LBS | DIASTOLIC BLOOD PRESSURE: 68 MMHG | HEIGHT: 65 IN | HEART RATE: 84 BPM | OXYGEN SATURATION: 97 % | BODY MASS INDEX: 27.16 KG/M2 | SYSTOLIC BLOOD PRESSURE: 116 MMHG | TEMPERATURE: 98.2 F

## 2022-03-30 DIAGNOSIS — E55.9 VITAMIN D DEFICIENCY: ICD-10-CM

## 2022-03-30 DIAGNOSIS — L82.0 SEBORRHEIC KERATOSIS, INFLAMED: ICD-10-CM

## 2022-03-30 DIAGNOSIS — L57.0 ACTINIC KERATOSIS: Primary | ICD-10-CM

## 2022-03-30 DIAGNOSIS — E53.8 CYANOCOBALAMIN DEFICIENCY: ICD-10-CM

## 2022-03-30 PROCEDURE — 3008F BODY MASS INDEX DOCD: CPT | Performed by: FAMILY MEDICINE

## 2022-03-30 PROCEDURE — 17000 DESTRUCT PREMALG LESION: CPT | Performed by: FAMILY MEDICINE

## 2022-03-30 PROCEDURE — 1036F TOBACCO NON-USER: CPT | Performed by: FAMILY MEDICINE

## 2022-03-30 PROCEDURE — 99214 OFFICE O/P EST MOD 30 MIN: CPT | Performed by: FAMILY MEDICINE

## 2022-03-30 NOTE — PROGRESS NOTES
Assessment/Plan:  Discussed with patient blood test results in detail  Lipids normal   B12 is low vitamin-D is low  Advised to continue start vitamin-D and B12 supplementation  Continue Crestor  Advised follow-up in 2 months for skin check  I have spent 30 minutes with Patient  today in which greater than 50% of this time was spent in counseling/coordination of care regarding Diagnostic results, Prognosis, Risks and benefits of tx options and Intructions for management  Problem List Items Addressed This Visit        Musculoskeletal and Integument    Actinic keratosis - Primary    Seborrheic keratosis, inflamed       Other    Vitamin D deficiency      Other Visit Diagnoses     Cyanocobalamin deficiency                Subjective:      Patient ID: Rios Richard is a 60 y o  female  18-year-old female presenting follow-up blood test results and skin check and treatment  Patient takes Crestor 10 milligram daily for hyperlipidemia  She complained feeling dizzy  She has actinic keratosis on her right shoulder  And multiple large dry scaly skin patches on her back that is very irritated inflamed  Patient had breast cancer history of radiation  Skin changes after the radiation        The following portions of the patient's history were reviewed and updated as appropriate:   Past Medical History:  She has a past medical history of Allergic contact dermatitis due to plants, except food (4/26/2021), Allergic rhinitis, BMI 25 0-25 9,adult (11/23/2020), Cancer ([facility] [address] ) (2008), Ear problems, GERD (gastroesophageal reflux disease), Heart palpitations, History of breast cancer (2008), History of mammogram (02/01/2018), HL (hearing loss), Hyperlipidemia, Immunization deficiency (5/27/2020), Impaired fasting glucose (5/27/2020), Prediabetes (11/23/2020), Rash and nonspecific skin eruption (4/26/2021), Tinnitus, and Vitamin D deficiency (5/27/2020)  ,  _______________________________________________________________________  Medical Problems:  does not have any pertinent problems on file ,  _______________________________________________________________________  Past Surgical History:   has a past surgical history that includes ENDOSCOPY OF POUCH (06/24/2019); Colonoscopy (01/01/2014); and Breast lumpectomy (01/01/2008)  ,  _______________________________________________________________________  Family History:  family history includes Breast cancer in her maternal aunt; Esophageal cancer in her maternal uncle; Heart disease in her brother and mother; Hyperlipidemia in her father and mother; Hypertension in her mother; Liver disease in her sister; Skin cancer in her father ,  _______________________________________________________________________  Social History:   reports that she has never smoked  She has never used smokeless tobacco  She reports that she does not drink alcohol and does not use drugs  ,  _______________________________________________________________________  Allergies:  is allergic to other     _______________________________________________________________________  Current Outpatient Medications   Medication Sig Dispense Refill    Diclofenac Sodium (VOLTAREN) 1 % Apply 2 g topically 4 (four) times a day 100 g 1    rosuvastatin (CRESTOR) 20 MG tablet Take 0 5 tablets (10 mg total) by mouth daily 45 tablet 1     No current facility-administered medications for this visit      _______________________________________________________________________  Review of Systems   Constitutional: Negative for activity change, appetite change, fatigue and unexpected weight change  HENT: Negative for ear pain, sore throat, trouble swallowing and voice change  Eyes: Negative for photophobia and visual disturbance  Respiratory: Negative for cough, chest tightness, shortness of breath and wheezing      Cardiovascular: Negative for chest pain, palpitations and leg swelling  Gastrointestinal: Negative for abdominal pain, constipation, diarrhea, nausea, rectal pain and vomiting  Endocrine: Negative for cold intolerance, polydipsia and polyuria  Genitourinary: Negative for difficulty urinating, dysuria, flank pain, menstrual problem and pelvic pain  Musculoskeletal: Negative for arthralgias, joint swelling and myalgias  Skin: Negative for color change and rash  Allergic/Immunologic: Negative for environmental allergies and immunocompromised state  Neurological: Negative for dizziness, weakness, numbness and headaches  Hematological: Negative for adenopathy  Does not bruise/bleed easily  Psychiatric/Behavioral: Negative for decreased concentration, dysphoric mood, self-injury, sleep disturbance and suicidal ideas  The patient is not nervous/anxious  Objective:  Vitals:    03/30/22 1425   BP: 116/68   BP Location: Left arm   Patient Position: Sitting   Cuff Size: Standard   Pulse: 84   Temp: 98 2 °F (36 8 °C)   TempSrc: Tympanic   SpO2: 97%   Weight: 73 9 kg (163 lb)   Height: 5' 5" (1 651 m)     Body mass index is 27 12 kg/m²  Physical Exam  Vitals and nursing note reviewed  Constitutional:       Appearance: Normal appearance  She is normal weight  Skin:     General: Skin is warm and dry  Neurological:      General: No focal deficit present  Mental Status: She is alert and oriented to person, place, and time  Mental status is at baseline  Psychiatric:         Mood and Affect: Mood normal          Behavior: Behavior normal          Thought Content: Thought content normal          Judgment: Judgment normal        Lesion Destruction    Date/Time: 3/30/2022 3:16 PM  Performed by: Kaylee Velez MD  Authorized by: Kaylee Velez MD   Universal Protocol:  Consent: Verbal consent obtained    Risks and benefits: risks, benefits and alternatives were discussed  Consent given by: patient  Time out: Immediately prior to procedure a "time out" was called to verify the correct patient, procedure, equipment, support staff and site/side marked as required  Timeout called at: 3/30/2022 3:16 PM   Patient understanding: patient states understanding of the procedure being performed  Patient consent: the patient's understanding of the procedure matches consent given  Procedure consent: procedure consent matches procedure scheduled  Patient identity confirmed: verbally with patient      Procedure Details - Lesion Destruction:     Number of Lesions:  1  Lesion 1:     Body area:  Upper extremity    Upper extremity location:  R shoulder    Initial size (mm):  2    Final defect size (mm):  3    Malignancy: pre-malignant lesion      Destruction method: cryotherapy    Lesion Destruction    Date/Time: 3/30/2022 3:17 PM  Performed by: Crystal Ambriz MD  Authorized by: Crystal Ambriz MD   Universal Protocol:  Consent: Verbal consent obtained  Risks and benefits: risks, benefits and alternatives were discussed  Consent given by: patient  Time out: Immediately prior to procedure a "time out" was called to verify the correct patient, procedure, equipment, support staff and site/side marked as required    Timeout called at: 3/30/2022 3:17 PM   Patient understanding: patient states understanding of the procedure being performed  Patient consent: the patient's understanding of the procedure matches consent given  Procedure consent: procedure consent matches procedure scheduled  Relevant documents: relevant documents present and verified  Patient identity confirmed: verbally with patient      Procedure Details - Lesion Destruction:     Number of Lesions:  6  Lesion 1:     Body area:  Trunk    Trunk location:  Back    Initial size (mm):  6    Final defect size (mm):  6    Malignancy: benign hyperkeratotic lesion      Destruction method: cryotherapy    Lesion 2:     Body area:  Trunk    Trunk location:  Back    Initial size (mm):  5    Final defect size (mm):  5    Malignancy: benign hyperkeratotic lesion      Destruction method: cryotherapy    Lesion 3:     Body area:  Trunk    Initial size (mm):  7    Final defect size (mm):  7    Malignancy: benign hyperkeratotic lesion      Destruction method: cryotherapy    Lesion 4:     Body area:  Trunk    Trunk location:  Back    Malignancy: benign hyperkeratotic lesion      Destruction method: cryotherapy    Lesion 5:     Body area:  Trunk    Trunk location:  L flank    Inital size (mm):  8    Final defect size (mm):  8    Malignancy: benign hyperkeratotic lesion      Destruction method: cryotherapy    Lesion 6:     Body area:  Trunk    Trunk location:  L flank    Initial size (mm):  6    Final defect size (mm):  6    Malignancy: benign hyperkeratotic lesion      Destruction method: cryotherapy

## 2022-04-04 LAB
VIT B1 BLD-SCNC: 155.4 NMOL/L (ref 66.5–200)
VIT B6 SERPL-MCNC: 35.8 UG/L (ref 2–32.8)

## 2022-04-29 ENCOUNTER — TELEPHONE (OUTPATIENT)
Dept: FAMILY MEDICINE CLINIC | Facility: CLINIC | Age: 57
End: 2022-04-29

## 2022-04-29 DIAGNOSIS — U07.1 COVID: Primary | ICD-10-CM

## 2022-04-29 RX ORDER — PREDNISONE 20 MG/1
20 TABLET ORAL DAILY
Qty: 7 TABLET | Refills: 0 | Status: SHIPPED | OUTPATIENT
Start: 2022-04-29 | End: 2022-05-06

## 2022-04-29 RX ORDER — DOXYCYCLINE HYCLATE 100 MG
100 TABLET ORAL 2 TIMES DAILY
Qty: 20 TABLET | Refills: 0 | Status: SHIPPED | OUTPATIENT
Start: 2022-04-29 | End: 2022-05-04

## 2022-05-04 ENCOUNTER — TELEMEDICINE (OUTPATIENT)
Dept: FAMILY MEDICINE CLINIC | Facility: CLINIC | Age: 57
End: 2022-05-04
Payer: COMMERCIAL

## 2022-05-04 VITALS — HEIGHT: 65 IN | BODY MASS INDEX: 27.16 KG/M2 | WEIGHT: 163 LBS

## 2022-05-04 DIAGNOSIS — U07.1 COVID: Primary | ICD-10-CM

## 2022-05-04 DIAGNOSIS — Z85.3 HISTORY OF BREAST CANCER: ICD-10-CM

## 2022-05-04 DIAGNOSIS — U07.1 COVID-19: ICD-10-CM

## 2022-05-04 PROCEDURE — 99213 OFFICE O/P EST LOW 20 MIN: CPT | Performed by: FAMILY MEDICINE

## 2022-05-04 PROCEDURE — 1036F TOBACCO NON-USER: CPT | Performed by: FAMILY MEDICINE

## 2022-05-04 PROCEDURE — 3008F BODY MASS INDEX DOCD: CPT | Performed by: FAMILY MEDICINE

## 2022-05-04 NOTE — PROGRESS NOTES
COVID-19 Outpatient Progress Note    Assessment/Plan:  covid 4/29/2022=vaccinated and boosted  Had breast cancer w radiation in the past  Mild sx, feeling well now  Finished 5 days doxy, still on prednisone, asa 325 mg daily  Ok to stop doxy=gi upset  Hydration/protein  Fu prn  Problem List Items Addressed This Visit        Other    History of breast cancer    COVID - Primary      Other Visit Diagnoses     COVID-19             Disposition:     Patient has COVID-19 infection  Based off CDC guidelines, they were recommended to isolate for 5 days from the date of the positive test  If they remain asymptomatic, isolation may be ended followed by 5 days of wearing a mask when around othes to minimize risk of infecting others  If they have a fever, continue to stay home until fever resolves for at least 24 hours  I have spent 20 minutes directly with the patient  Greater than 50% of this time was spent in counseling/coordination of care regarding: diagnostic results, prognosis, risks and benefits of treatment options, importance of treatment compliance, risk factor reductions and impressions  Encounter provider Mahsa Villaotro MD    Provider located at [address] [place] [address] [id number] [phone]    Recent Visits  Date Type Provider Dept   04/29/22 Telephone Mahsa Villatoro MD [facility]   Showing recent visits within past 7 days and meeting all other requirements  Today's Visits  Date Type Provider Dept   05/04/22 Telemedicine Mahsa Villatoro MD [facility] [id number] [address] today's visits and meeting all other requirements  Future Appointments  No visits were found meeting these conditions  Showing future appointments within next 150 days and meeting all other requirements     This virtual check-in was done via [employer] and patient was informed that this is a secure, HIPAA-compliant platform   She agrees to proceed  Patient agrees to participate in a virtual check in via telephone or video visit instead of presenting to the office to address urgent/immediate medical needs  Patient is aware this is a billable service  After connecting through [facility], the patient was identified by name and date of birth  Leonard Stoner was informed that this was a telemedicine visit and that the exam was being conducted confidentially over secure lines  My office door was closed  No one else was in the room  Leonard Stoner acknowledged consent and understanding of privacy and security of the telemedicine visit  I informed the patient that I have reviewed her record in Epic and presented the opportunity for her to ask any questions regarding the visit today  The patient agreed to participate  Verification of patient location:  Patient is located in the following state in which I hold an active license: [place]    Subjective:   Leonard Stoner is a 60 y o  female who has been screened for COVID-19  Symptom change since last report: resolving  Patient's symptoms include fever, chills, fatigue and malaise  - Date of symptom onset: 4/29/2022  - Date of exposure: 4/28/2022  - Date of positive COVID-19 test: 4/29/2022  Type of test: Home antigen  COVID-19 vaccination status: Fully vaccinated with booster    Edil Badillo has been staying home and has isolated themselves in her home  She is taking care to not share personal items and is cleaning all surfaces that are touched often, like counters, tabletops, and doorknobs using household cleaning sprays or wipes  She is wearing a mask when she leaves her room       No results found for: Loly Ann, [address] [facility], [address] [facility], [facility], [address] [facility], [facility]  Past Medical History:   Diagnosis Date    Allergic contact dermatitis due to plants, except food 4/26/2021    Allergic rhinitis     BMI 25 0-25 9,adult 11/23/2020    Cancer ([facility] [address] ) 2008    Ear problems     GERD (gastroesophageal reflux disease)     last egd 2/2016    Heart palpitations     History of breast cancer 2008    right breast was on tamoxifen and was done in 3/2014    History of mammogram 02/01/2018    HL (hearing loss)     Hyperlipidemia     Immunization deficiency 5/27/2020    Impaired fasting glucose 5/27/2020    Prediabetes 11/23/2020    Rash and nonspecific skin eruption 4/26/2021    Tinnitus     Vitamin D deficiency 5/27/2020    17     Past Surgical History:   Procedure Laterality Date    BREAST LUMPECTOMY  01/01/2008    COLONOSCOPY  01/01/2014    ENDOSCOPY OF [facility]  06/24/2019     Current Outpatient Medications   Medication Sig Dispense Refill    Diclofenac Sodium (VOLTAREN) 1 % Apply 2 g topically 4 (four) times a day 100 g 1    predniSONE 20 mg tablet Take 1 tablet (20 mg total) by mouth daily for 7 days 7 tablet 0    rosuvastatin (CRESTOR) 20 MG tablet Take 0 5 tablets (10 mg total) by mouth daily 45 tablet 1     No current facility-administered medications for this visit  Allergies   Allergen Reactions    Other Other (See Comments)     Environmental       Review of Systems   Constitutional: Positive for chills, fatigue and fever  Objective:    Vitals:    05/04/22 1410   Weight: 73 9 kg (163 lb)   Height: 5' 5" (1 651 m)       Physical Exam  Vitals and nursing note reviewed  Constitutional:       Appearance: Normal appearance  HENT:      Head: Normocephalic and atraumatic  Pulmonary:      Effort: Pulmonary effort is normal    Neurological:      Mental Status: She is alert and oriented to person, place, and time  Psychiatric:         Mood and Affect: Mood normal          Behavior: Behavior normal          Thought Content: Thought content normal          Judgment: Judgment normal          VIRTUAL VISIT DISCLAIMER    Rachell Ocampo verbally agrees to participate in [employer]   Pt is aware that [employer] could be limited without vital signs or the ability to perform a full hands-on physical exam  Abigail Dee understands she or the provider may request at any time to terminate the video visit and request the patient to seek care or treatment in person

## 2022-06-24 ENCOUNTER — CLINICAL SUPPORT (OUTPATIENT)
Dept: FAMILY MEDICINE CLINIC | Facility: CLINIC | Age: 57
End: 2022-06-24
Payer: COMMERCIAL

## 2022-06-24 DIAGNOSIS — Z23 NEED FOR SHINGLES VACCINE: ICD-10-CM

## 2022-06-24 DIAGNOSIS — Z28.39 IMMUNIZATION DEFICIENCY: ICD-10-CM

## 2022-06-24 DIAGNOSIS — Z23 NEED FOR VACCINATION: Primary | ICD-10-CM

## 2022-06-24 PROCEDURE — 90471 IMMUNIZATION ADMIN: CPT

## 2022-06-24 PROCEDURE — 90750 HZV VACC RECOMBINANT IM: CPT

## 2022-11-02 DIAGNOSIS — E78.2 MIXED HYPERLIPIDEMIA: ICD-10-CM

## 2022-11-03 RX ORDER — ROSUVASTATIN CALCIUM 20 MG/1
TABLET, COATED ORAL
Qty: 45 TABLET | Refills: 3 | Status: SHIPPED | OUTPATIENT
Start: 2022-11-03

## 2022-12-07 ENCOUNTER — OFFICE VISIT (OUTPATIENT)
Dept: FAMILY MEDICINE CLINIC | Facility: CLINIC | Age: 57
End: 2022-12-07

## 2022-12-07 ENCOUNTER — HOSPITAL ENCOUNTER (OUTPATIENT)
Dept: RADIOLOGY | Facility: HOSPITAL | Age: 57
Discharge: HOME | End: 2022-12-07
Attending: FAMILY MEDICINE

## 2022-12-07 VITALS
BODY MASS INDEX: 26.82 KG/M2 | SYSTOLIC BLOOD PRESSURE: 122 MMHG | TEMPERATURE: 98.1 F | HEIGHT: 65 IN | WEIGHT: 161 LBS | OXYGEN SATURATION: 97 % | DIASTOLIC BLOOD PRESSURE: 70 MMHG | HEART RATE: 80 BPM

## 2022-12-07 DIAGNOSIS — Z78.0 POST-MENOPAUSAL: ICD-10-CM

## 2022-12-07 DIAGNOSIS — M25.512 ACUTE PAIN OF LEFT SHOULDER: ICD-10-CM

## 2022-12-07 DIAGNOSIS — E78.2 MIXED HYPERLIPIDEMIA: ICD-10-CM

## 2022-12-07 DIAGNOSIS — E55.9 VITAMIN D DEFICIENCY: ICD-10-CM

## 2022-12-07 DIAGNOSIS — R73.03 PREDIABETES: ICD-10-CM

## 2022-12-07 DIAGNOSIS — R10.13 EPIGASTRIC PAIN: ICD-10-CM

## 2022-12-07 DIAGNOSIS — G95.9 DISEASE OF SPINAL CORD (HCC): ICD-10-CM

## 2022-12-07 DIAGNOSIS — E53.8 CYANOCOBALAMIN DEFICIENCY: ICD-10-CM

## 2022-12-07 DIAGNOSIS — Z85.3 HISTORY OF BREAST CANCER: Primary | ICD-10-CM

## 2022-12-07 DIAGNOSIS — Z82.49 FAMILY HISTORY OF EARLY CAD: ICD-10-CM

## 2022-12-07 RX ORDER — OMEPRAZOLE 20 MG/1
20 CAPSULE, DELAYED RELEASE ORAL DAILY
Qty: 90 CAPSULE | Refills: 1 | Status: SHIPPED | OUTPATIENT
Start: 2022-12-07

## 2022-12-07 NOTE — PROGRESS NOTES
Name: Jayden Aleman      : 1965      MRN: [id number]  Encounter Provider: Butch Reilly MD  Encounter Date: 2022   Encounter department: [facility]    Assessment & Plan       EKG in office= NSR 68 bpm  QRS 96 ms   ms   ms  No arrhythmia  Refer to cardio for risk factor and strong fam h/o CAD=consider coronary risk factor   Get xr l shoulder to eval for arthritis  Get dexa=risk osteoporosis  Get blood work for epigastric pain=reflux  Fu lipid    I have spent 40 minutes with Patient  today in which greater than 50% of this time was spent in counseling/coordination of care regarding Diagnostic results, Prognosis, Risks and benefits of tx options and Intructions for management  1  History of breast cancer  -     DXA bone density spine hip and pelvis; Future; Expected date: 2022    2  Mixed hyperlipidemia  -     Comprehensive metabolic panel; Future  -     Lipid Panel with Direct LDL reflex; Future  -     TSH, 3rd generation with Free T4 reflex; Future  -     UA w Reflex to Microscopic w Reflex to Culture  -     Ambulatory Referral to Cardiology; Future    3  Post-menopausal  -     DXA bone density spine hip and pelvis; Future; Expected date: 2022    4  Disease of spinal cord ([facility] [address] )    5  Prediabetes  -     Hemoglobin A1C; Future  -     UA w Reflex to Microscopic w Reflex to Culture    6  Vitamin D deficiency  -     Vitamin D 25 hydroxy; Future    7  Acute pain of left shoulder  -     POCT ECG  -     XR shoulder 2+ vw left; Future; Expected date: 2022  -     Uric acid; Future  -     Lyme Total Antibody Profile with reflex to WB; Future  -     Sedimentation rate, automated; Future  -     KENISHA Screen w/ Reflex to Titer/Pattern; Future  -     RF Screen w/ Reflex to Titer; Future    8  Epigastric pain  -     CBC and differential; Future  -     Comprehensive metabolic panel; Future  -     Amylase; Future  -     Lipase;  Future  -     omeprazole (PriLOSEC) 20 mg delayed release capsule; Take 1 capsule (20 mg total) by mouth daily    9  Family history of early CAD  -     POCT ECG  -     Ambulatory Referral to Cardiology; Future    10  Cyanocobalamin deficiency  -     Vitamin B12; Future  -     Vitamin B1 (Thiamine), Serum/Plasma, LC/MS/MS; Future  -     Vitamin B6; Future         Subjective      58 yo F c/o 1 w h/o epigastric pain/BL ribs pain, and L shoulder pain  No trauma  Pt works as a hair dressor  Strong fam h/o CAD=mom w MI at 64, dad w CABG, brother w MI at 60 yo  Pt has hyperlilpidemia on crestor 10 mg daily  No fever/chills  Had covid 4/2022 resolved  No chest pain/pressure/nausea  No recent stress  Had h/o breast cancer 2009 finished chemo, then tamoxifen x 5 y after  H/o gastric polyp  pecid complete helped w epigastric pain, h/o nexium use  Up to date w colonoscopy    Back Pain  Associated symptoms include abdominal pain  Pertinent negatives include no chest pain, dysuria, headaches, numbness, pelvic pain or weakness  Shoulder Pain   Pertinent negatives include no numbness  Review of Systems   Constitutional: Negative for activity change, appetite change, fatigue and unexpected weight change  HENT: Negative for ear pain, sore throat, trouble swallowing and voice change  Eyes: Negative for photophobia and visual disturbance  Respiratory: Negative for cough, chest tightness, shortness of breath and wheezing  Cardiovascular: Negative for chest pain, palpitations and leg swelling  Gastrointestinal: Positive for abdominal pain  Negative for constipation, diarrhea, nausea, rectal pain and vomiting  Endocrine: Negative for cold intolerance, polydipsia and polyuria  Genitourinary: Negative for difficulty urinating, dysuria, flank pain, menstrual problem and pelvic pain  Musculoskeletal: Positive for back pain and myalgias  Negative for arthralgias and joint swelling  Skin: Negative for color change and rash     Allergic/Immunologic: Negative for environmental allergies and immunocompromised state  Neurological: Negative for dizziness, weakness, numbness and headaches  Hematological: Negative for adenopathy  Does not bruise/bleed easily  Psychiatric/Behavioral: Negative for decreased concentration, dysphoric mood, self-injury, sleep disturbance and suicidal ideas  The patient is not nervous/anxious  Current Outpatient Medications on File Prior to Visit   Medication Sig   • Diclofenac Sodium (VOLTAREN) 1 % Apply 2 g topically 4 (four) times a day   • rosuvastatin (CRESTOR) 20 MG tablet TAKE ONE-HALF TABLET BY  MOUTH DAILY       Objective     /70 (BP Location: Left arm, Patient Position: Sitting, Cuff Size: Adult)   Pulse 80   Temp 98 1 °F (36 7 °C) (Tympanic)   Ht 5' 5" (1 651 m)   Wt 73 kg (161 lb)   SpO2 97%   BMI 26 79 kg/m²     Physical Exam  Vitals and nursing note reviewed  Constitutional:       Appearance: Normal appearance  She is well-developed  HENT:      Head: Normocephalic and atraumatic  Right Ear: Tympanic membrane, ear canal and external ear normal       Left Ear: Tympanic membrane, ear canal and external ear normal       Nose: Nose normal       Mouth/Throat:      Mouth: Mucous membranes are moist       Pharynx: Oropharynx is clear  No oropharyngeal exudate  Eyes:      Extraocular Movements: Extraocular movements intact  Pupils: Pupils are equal, round, and reactive to light  Neck:      Thyroid: No thyromegaly  Cardiovascular:      Rate and Rhythm: Normal rate and regular rhythm  Pulses: Normal pulses  Heart sounds: Normal heart sounds  No murmur heard  Pulmonary:      Effort: Pulmonary effort is normal  No respiratory distress  Breath sounds: Normal breath sounds  No wheezing or rales  Abdominal:      General: Bowel sounds are normal  There is no distension  Palpations: Abdomen is soft  Tenderness: There is no abdominal tenderness  There is no guarding  Comments: Epigastric tender   Genitourinary:     Vagina: Normal    Musculoskeletal:         General: No tenderness  Normal range of motion  Cervical back: Normal range of motion and neck supple  Comments: Crepitus left shoulder at rotator cuff   Skin:     General: Skin is warm and dry  Capillary Refill: Capillary refill takes less than 2 seconds  Findings: No rash  Neurological:      General: No focal deficit present  Mental Status: She is alert and oriented to person, place, and time  Mental status is at baseline  Psychiatric:         Mood and Affect: Mood normal          Behavior: Behavior normal          Thought Content:  Thought content normal          Judgment: Judgment normal        Darian Peres MD

## 2022-12-10 ENCOUNTER — APPOINTMENT (OUTPATIENT)
Dept: LAB | Facility: CLINIC | Age: 57
End: 2022-12-10

## 2022-12-10 DIAGNOSIS — R73.03 PREDIABETES: ICD-10-CM

## 2022-12-10 DIAGNOSIS — E78.2 MIXED HYPERLIPIDEMIA: ICD-10-CM

## 2022-12-10 DIAGNOSIS — E53.8 CYANOCOBALAMIN DEFICIENCY: ICD-10-CM

## 2022-12-10 DIAGNOSIS — M25.512 ACUTE PAIN OF LEFT SHOULDER: ICD-10-CM

## 2022-12-10 DIAGNOSIS — E55.9 VITAMIN D DEFICIENCY: ICD-10-CM

## 2022-12-10 DIAGNOSIS — R10.13 EPIGASTRIC PAIN: ICD-10-CM

## 2022-12-10 LAB
25(OH)D3 SERPL-MCNC: 31.8 NG/ML (ref 30–100)
ALBUMIN SERPL BCP-MCNC: 4.6 G/DL (ref 3.5–5)
ALP SERPL-CCNC: 68 U/L (ref 34–104)
ALT SERPL W P-5'-P-CCNC: 13 U/L (ref 7–52)
AMYLASE SERPL-CCNC: 70 IU/L (ref 29–103)
ANION GAP SERPL CALCULATED.3IONS-SCNC: 7 MMOL/L (ref 4–13)
AST SERPL W P-5'-P-CCNC: 14 U/L (ref 13–39)
BASOPHILS # BLD AUTO: 0.03 THOUSANDS/ÂΜL (ref 0–0.1)
BASOPHILS NFR BLD AUTO: 1 % (ref 0–1)
BILIRUB SERPL-MCNC: 0.43 MG/DL (ref 0.2–1)
BILIRUB UR QL STRIP: NEGATIVE
BUN SERPL-MCNC: 11 MG/DL (ref 5–25)
CALCIUM SERPL-MCNC: 9.2 MG/DL (ref 8.4–10.2)
CHLORIDE SERPL-SCNC: 104 MMOL/L (ref 96–108)
CHOLEST SERPL-MCNC: 171 MG/DL
CLARITY UR: CLEAR
CO2 SERPL-SCNC: 30 MMOL/L (ref 21–32)
COLOR UR: COLORLESS
CREAT SERPL-MCNC: 0.7 MG/DL (ref 0.6–1.3)
EOSINOPHIL # BLD AUTO: 0.11 THOUSAND/ÂΜL (ref 0–0.61)
EOSINOPHIL NFR BLD AUTO: 2 % (ref 0–6)
ERYTHROCYTE [DISTWIDTH] IN BLOOD BY AUTOMATED COUNT: 12.2 % (ref 11.6–15.1)
ERYTHROCYTE [SEDIMENTATION RATE] IN BLOOD: 17 MM/HOUR (ref 0–29)
EST. AVERAGE GLUCOSE BLD GHB EST-MCNC: 108 MG/DL
GFR SERPL CREATININE-BSD FRML MDRD: 95 ML/MIN/1.73SQ M
GLUCOSE P FAST SERPL-MCNC: 103 MG/DL (ref 65–99)
GLUCOSE UR STRIP-MCNC: NEGATIVE MG/DL
HBA1C MFR BLD: 5.4 %
HCT VFR BLD AUTO: 39.9 % (ref 34.8–46.1)
HDLC SERPL-MCNC: 58 MG/DL
HGB BLD-MCNC: 13.3 G/DL (ref 11.5–15.4)
HGB UR QL STRIP.AUTO: NEGATIVE
IMM GRANULOCYTES # BLD AUTO: 0.03 THOUSAND/UL (ref 0–0.2)
IMM GRANULOCYTES NFR BLD AUTO: 1 % (ref 0–2)
KETONES UR STRIP-MCNC: NEGATIVE MG/DL
LDLC SERPL CALC-MCNC: 102 MG/DL (ref 0–100)
LEUKOCYTE ESTERASE UR QL STRIP: NEGATIVE
LIPASE SERPL-CCNC: 22 U/L (ref 11–82)
LYMPHOCYTES # BLD AUTO: 1.93 THOUSANDS/ÂΜL (ref 0.6–4.47)
LYMPHOCYTES NFR BLD AUTO: 32 % (ref 14–44)
MCH RBC QN AUTO: 30.2 PG (ref 26.8–34.3)
MCHC RBC AUTO-ENTMCNC: 33.3 G/DL (ref 31.4–37.4)
MCV RBC AUTO: 91 FL (ref 82–98)
MONOCYTES # BLD AUTO: 0.3 THOUSAND/ÂΜL (ref 0.17–1.22)
MONOCYTES NFR BLD AUTO: 5 % (ref 4–12)
NEUTROPHILS # BLD AUTO: 3.6 THOUSANDS/ÂΜL (ref 1.85–7.62)
NEUTS SEG NFR BLD AUTO: 59 % (ref 43–75)
NITRITE UR QL STRIP: NEGATIVE
NRBC BLD AUTO-RTO: 0 /100 WBCS
PH UR STRIP.AUTO: 5.5 [PH]
PLATELET # BLD AUTO: 174 THOUSANDS/UL (ref 149–390)
PMV BLD AUTO: 10.6 FL (ref 8.9–12.7)
POTASSIUM SERPL-SCNC: 4.1 MMOL/L (ref 3.5–5.3)
PROT SERPL-MCNC: 7.5 G/DL (ref 6.4–8.4)
PROT UR STRIP-MCNC: NEGATIVE MG/DL
RBC # BLD AUTO: 4.4 MILLION/UL (ref 3.81–5.12)
SODIUM SERPL-SCNC: 141 MMOL/L (ref 135–147)
SP GR UR STRIP.AUTO: 1 (ref 1–1.03)
TRIGL SERPL-MCNC: 53 MG/DL
TSH SERPL DL<=0.05 MIU/L-ACNC: 1.49 UIU/ML (ref 0.45–4.5)
URATE SERPL-MCNC: 3.7 MG/DL (ref 2–7.5)
UROBILINOGEN UR STRIP-ACNC: <2 MG/DL
VIT B12 SERPL-MCNC: 615 PG/ML (ref 100–900)
WBC # BLD AUTO: 6 THOUSAND/UL (ref 4.31–10.16)

## 2022-12-11 LAB
ANA SER QL IA: NEGATIVE
B BURGDOR IGG+IGM SER-ACNC: 0.3 AI

## 2022-12-12 LAB — RHEUMATOID FACT SER QL LA: NEGATIVE

## 2022-12-13 LAB — VIT B6 SERPL-MCNC: 10 UG/L (ref 3.4–65.2)

## 2022-12-14 ENCOUNTER — CONSULT (OUTPATIENT)
Dept: CARDIOLOGY CLINIC | Facility: CLINIC | Age: 57
End: 2022-12-14

## 2022-12-14 VITALS
SYSTOLIC BLOOD PRESSURE: 122 MMHG | OXYGEN SATURATION: 98 % | DIASTOLIC BLOOD PRESSURE: 68 MMHG | WEIGHT: 161 LBS | HEART RATE: 71 BPM | BODY MASS INDEX: 26.82 KG/M2 | TEMPERATURE: 98.4 F | HEIGHT: 65 IN

## 2022-12-14 DIAGNOSIS — E66.3 OVERWEIGHT (BMI 25.0-29.9): ICD-10-CM

## 2022-12-14 DIAGNOSIS — R07.9 CHEST PAIN, UNSPECIFIED TYPE: Primary | ICD-10-CM

## 2022-12-14 DIAGNOSIS — Z82.49 FAMILY HISTORY OF EARLY CAD: ICD-10-CM

## 2022-12-14 DIAGNOSIS — Z85.3 HISTORY OF BREAST CANCER: ICD-10-CM

## 2022-12-14 DIAGNOSIS — E78.00 PURE HYPERCHOLESTEROLEMIA: ICD-10-CM

## 2022-12-14 LAB — VIT B1 BLD-SCNC: 160.2 NMOL/L (ref 66.5–200)

## 2022-12-14 NOTE — PROGRESS NOTES
[facility] [place] [address]  Phone#  [phone]  Fax#  [phone]                                               Cardiology Office Consult  Bobby Galvez, 59 y o  female  YOB: 1965  MRN: [id number] Encounter: [id number]      PCP - Darian Whiting MD  Referring Provider - Jeison Austin MD    Chief Complaint   Patient presents with   • Consult       Assessment  Chest/ Left shoulder pain  Back pain  Hyperlipidemia  H/o breast cancer  S/p lumpectomy + chemotherapy + radiation (2008, [facility])  oncotype score 22  Family history of early CAD  GERD  Overweight, Body mass index is 26 79 kg/m²  Plan  Chest / Left shoulder pain, Family history of early CAD  Atypical symptoms, no clear exertional symptoms  But she does have a strong family history of early CAD as well as severe hyperlipidemia, and has not had any recent ischemic or cardiac testing  ?musculoskeletal v CAD  Check exercise ECG stress test for risk stratification  She has a history of chemotherapy/radiation for breast cancer as well --> Check echocardiogram to assess EF and structural abnormalities  Tylenol PRN for now, and she will notify us if any worsening symptoms    Hyperlipidemia, Obesity - Body mass index is 26 79 kg/m²       Overview  On statins since age 42, tried to stop it in between - but cholesterol went up to 280s in the past  Strong family history of hyperlipidemia not family member  possibly has heterozygous famililial hyperlipidemia  Management  Continue rosuvastatin 20 mg to  Diet modifications  Weight loss of 5 to 8 pounds is recommended  Increase exercise to include walking 30 minutes 5 days a week for a total of 150 minutes/week    Results for orders placed or performed in visit on 12/14/22   POCT ECG    Impression    Normal sinus rhythm  Low voltage QRS         Orders Placed This Encounter   Procedures   • Stress test only, exercise   • POCT ECG   • Echo complete w/ contrast if indicated     Return in about 2 months (around 2/14/2023), or if symptoms worsen or fail to improve  History of Present Illness   59 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 , who works at a salon, comes in as a new patient for consultation regarding symptoms of chest/left shoulder pain and back pain  She reports to sites of particular discomfort  She reports pain on the left side around her shoulder extending into the left upper chest wall region, which has been present intermittently  There is no clear aggravating or relieving factor for this  It does not change with any change in position of the shoulder or arm  She was evaluated for this with a shoulder x-ray and was found to have mild left acromioclavicular arthritis  She has not needed any medical therapy for this  Additionally, she reports symptoms of chest discomfort or bilateral lower chest wall region, as well as the corresponding region in the back  He described it as an achiness sensation  No clear aggravating or relieving factors  It is usually brief and lasts for a minute or 2 and then resolves on its own without additional therapy  Associated symptoms are nausea, vomiting or diaphoresis  She does have a strong family history of early CAD in her mother and brother as well as a longstanding history of severe hyperlipidemia for which she is on statin therapy since the age of 42  She notes that her mother also had good pain is a sign of early CAD and as a result she has been concerned and is here for cardiac evaluation  Family history:   Mother had heart attack at 64, Hyperlipidemia  Brother had heart attack at 61, Hyperlipidemia      Historical Information   Past Medical History:   Diagnosis Date   • Allergic contact dermatitis due to plants, except food 4/26/2021   • Allergic rhinitis    • BMI 25 0-25 9,adult 11/23/2020   • Cancer ([facility] [address] ) 2008   • Ear problems    • GERD (gastroesophageal reflux disease)     last egd 2/2016   • Heart palpitations • History of breast cancer 2008    right breast was on tamoxifen and was done in 3/2014   • History of mammogram 02/01/2018   • HL (hearing loss)    • Hyperlipidemia    • Immunization deficiency 5/27/2020   • Impaired fasting glucose 5/27/2020   • Prediabetes 11/23/2020   • Rash and nonspecific skin eruption 4/26/2021   • Tinnitus    • Vitamin D deficiency 5/27/2020    17     Past Surgical History:   Procedure Laterality Date   • BREAST LUMPECTOMY  01/01/2008   • COLONOSCOPY  01/01/2014   • ENDOSCOPY OF [facility]  06/24/2019     Family History   Problem Relation Age of Onset   • Hypertension Mother    • Heart disease Mother    • Hyperlipidemia Mother    • Skin cancer Father         basal call carcinoma    • Hyperlipidemia Father    • Heart disease Brother    • Breast cancer Maternal Aunt    • Esophageal cancer Maternal Uncle    • Liver disease Sister      Current Outpatient Medications on File Prior to Visit   Medication Sig Dispense Refill   • Diclofenac Sodium (VOLTAREN) 1 % Apply 2 g topically 4 (four) times a day 100 g 1   • omeprazole (PriLOSEC) 20 mg delayed release capsule Take 1 capsule (20 mg total) by mouth daily 90 capsule 1   • rosuvastatin (CRESTOR) 20 MG tablet TAKE ONE-HALF TABLET BY  MOUTH DAILY 45 tablet 3     No current facility-administered medications on file prior to visit       Allergies   Allergen Reactions   • Other Other (See Comments)     Environmental     Social History     Socioeconomic History   • Marital status: /Civil Union     Spouse name: None   • Number of children: None   • Years of education: None   • Highest education level: None   Occupational History   • None   Tobacco Use   • Smoking status: Never   • Smokeless tobacco: Never   Substance and Sexual Activity   • Alcohol use: Never     Comment: ocassional    • Drug use: Never   • Sexual activity: Yes     Partners: Male   Other Topics Concern   • None   Social History Narrative    Most recent tobacco use screening: 07-      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      Marital status:         Exercise level:   None      Diet:   Regular      Caffeine intake:   Occasional      Seat belts used routinely:   Yes      Smoke alarm in home:   Yes      Please enter the date of the Patient's previous mammogram :   02-      Date of last Colonoscopy:   5 years ago  is due now  Social Determinants of Health     Financial Resource Strain: Not on file   Food Insecurity: Not on file   Transportation Needs: Not on file   Physical Activity: Not on file   Stress: Not on file   Social Connections: Not on file   Intimate Partner Violence: Not on file   Housing Stability: Not on file        Review of Systems   All other systems reviewed and are negative  Vitals:  Vitals:    12/14/22 1439   BP: 122/68   BP Location: Left arm   Patient Position: Sitting   Cuff Size: Standard   Pulse: 71   Temp: 98 4 °F (36 9 °C)   SpO2: 98%   Weight: 73 kg (161 lb)   Height: 5' 5" (1 651 m)     BMI - Body mass index is 26 79 kg/m²  Wt Readings from Last 7 Encounters:   12/14/22 73 kg (161 lb)   12/07/22 73 kg (161 lb)   10/31/22 73 9 kg (163 lb)   05/04/22 73 9 kg (163 lb)   03/30/22 73 9 kg (163 lb)   03/16/22 74 1 kg (163 lb 6 4 oz)   11/01/21 71 7 kg (158 lb)       Physical Exam  Vitals and nursing note reviewed  Constitutional:       General: She is not in acute distress  Appearance: Normal appearance  She is well-developed  She is not ill-appearing or diaphoretic  HENT:      Head: Normocephalic and atraumatic  Nose: No congestion  Eyes:      General: No scleral icterus  Conjunctiva/sclera: Conjunctivae normal    Neck:      Vascular: No carotid bruit or JVD  Cardiovascular:      Rate and Rhythm: Normal rate and regular rhythm  Pulses: Normal pulses  Heart sounds: Normal heart sounds  No murmur heard  No friction rub  No gallop     Pulmonary:      Effort: Pulmonary effort is normal  No respiratory distress  Breath sounds: Normal breath sounds  No rales  Abdominal:      General: There is no distension  Palpations: Abdomen is soft  Tenderness: There is no abdominal tenderness  Musculoskeletal:         General: No swelling or tenderness  Cervical back: Neck supple  Right lower leg: No edema  Left lower leg: No edema  Skin:     General: Skin is warm  Neurological:      General: No focal deficit present  Mental Status: She is alert and oriented to person, place, and time  Mental status is at baseline  Psychiatric:         Mood and Affect: Mood normal          Behavior: Behavior normal          Thought Content: Thought content normal            Labs:  CBC:   Lab Results   Component Value Date    WBC 6 00 12/10/2022    RBC 4 40 12/10/2022    HGB 13 3 12/10/2022    HCT 39 9 12/10/2022    MCV 91 12/10/2022     12/10/2022    RDW 12 2 12/10/2022       CMP:   Lab Results   Component Value Date    K 4 1 12/10/2022     12/10/2022    CO2 30 12/10/2022    BUN 11 12/10/2022    CREATININE 0 70 12/10/2022    EGFR 95 12/10/2022    CALCIUM 9 2 12/10/2022    AST 14 12/10/2022    ALT 13 12/10/2022    ALKPHOS 68 12/10/2022       Magnesium:  No results found for: MG    Lipid Profile:   Lab Results   Component Value Date    HDL 58 12/10/2022    TRIG 53 12/10/2022    LDLCALC 102 (H) 12/10/2022       Thyroid Studies:   Lab Results   Component Value Date    GMJ5FIYVGSLM 1 488 12/10/2022       A1c:  No components found for: HGA1C    INR:  No results found for: DRC8    Imaging: XR shoulder 2+ vw left    Result Date: 12/11/2022  Narrative: LEFT SHOULDER INDICATION:   M25 512: Pain in left shoulder  COMPARISON:  None VIEWS:  XR SHOULDER 2+ VW LEFT FINDINGS: There is no acute fracture or dislocation  Mild osteoarthritis acromioclavicular joint  No lytic or blastic osseous lesion  Soft tissues are unremarkable  Impression: No acute osseous abnormality   Workstation performed: [id number]       Cardiac testing:   No results found for this or any previous visit  No results found for this or any previous visit  No results found for this or any previous visit  No results found for this or any previous visit  XR shoulder 2+ vw left  Narrative: LEFT SHOULDER    INDICATION:   M25 512: Pain in left shoulder  COMPARISON:  None    VIEWS:  XR SHOULDER 2+ VW LEFT     FINDINGS:    There is no acute fracture or dislocation  Mild osteoarthritis acromioclavicular joint  No lytic or blastic osseous lesion  Soft tissues are unremarkable  Impression: No acute osseous abnormality      Workstation performed: [id number]

## 2022-12-29 ENCOUNTER — CONSULT (OUTPATIENT)
Dept: GASTROENTEROLOGY | Facility: CLINIC | Age: 57
End: 2022-12-29

## 2022-12-29 ENCOUNTER — TELEPHONE (OUTPATIENT)
Dept: GASTROENTEROLOGY | Facility: CLINIC | Age: 57
End: 2022-12-29

## 2022-12-29 VITALS
HEIGHT: 65 IN | HEART RATE: 71 BPM | SYSTOLIC BLOOD PRESSURE: 124 MMHG | BODY MASS INDEX: 26.94 KG/M2 | WEIGHT: 161.7 LBS | DIASTOLIC BLOOD PRESSURE: 71 MMHG

## 2022-12-29 DIAGNOSIS — K31.7 GASTRIC POLYP: ICD-10-CM

## 2022-12-29 DIAGNOSIS — Z85.3 HISTORY OF BREAST CANCER: ICD-10-CM

## 2022-12-29 DIAGNOSIS — K29.70 GASTRITIS WITHOUT BLEEDING, UNSPECIFIED CHRONICITY, UNSPECIFIED GASTRITIS TYPE: ICD-10-CM

## 2022-12-29 DIAGNOSIS — R10.10 PAIN OF UPPER ABDOMEN: Primary | ICD-10-CM

## 2022-12-29 NOTE — PROGRESS NOTES
[facility] - Outpatient Consultation  Bruna Hill 59 y o  female MRN: [id number]  Encounter: [id number]          ASSESSMENT AND PLAN:      1  Pain of upper abdomen  Patient is complaining of upper abdominal pain mostly underneath both rib cages  The pain is quite bothersome however he does not double over in pain  There is some pain also in the back on the paraspinal area in the lower thoracic region  She denies pain getting worse on physical activities and body movements  She denies any dysphagia or odontophagia  She does have history of reflux however has not required any medication recently  She has history of gastric polyp  She denies any dysphagia or odynophagia  There is no nausea or vomiting  She denies any chest pain, cough or wheezing  She does have history of coronary artery disease in her family  Currently she is getting a stress test performed by cardiology next week  I will order ultrasound of her abdomen along with stool Hemoccult  Upper endoscopy will also be done for evaluation  Recommendations will follow at that time  - US abdomen complete; Future  - Occult Blood, Fecal Immunochemical; Future  - EGD; Future    2  Gastritis without bleeding, unspecified chronicity, unspecified gastritis type  She has history of gastritis and gastric polyp  Endoscopy will be done for evaluation and recommendations will follow after that  - US abdomen complete; Future  - EGD; Future    3  Gastric polyp  History of gastric polyp  She hyperplastic polyps  Endoscopy will be repeated in polypectomies performed again     - EGD; Future    4  History of breast cancer  Patient history of breast cancer     ______________________________________________________________________    HPI: Left and right upper quadrant abdominal pain  Pain is not food related  Pain also in the paraspinal area  History of gastric polyp and gastritis    Denies any change in bowel habits or rectal bleeding  Her last colonoscopy was in 2016  Next colonoscopy for screening is due in 2026  Will order stool for Hemoccult also  REVIEW OF SYSTEMS:    CONSTITUTIONAL: Denies any fever, chills, rigors, and weight loss  HEENT: No earache or tinnitus  Denies hearing loss or visual disturbances  CARDIOVASCULAR: No chest pain or palpitations  RESPIRATORY: Denies any cough, hemoptysis, shortness of breath or dyspnea on exertion  GASTROINTESTINAL: As noted in the History of Present Illness  GENITOURINARY: No problems with urination  Denies any hematuria or dysuria  NEUROLOGIC: No dizziness or vertigo, denies headaches  MUSCULOSKELETAL: Denies any muscle or joint pain  SKIN: Denies skin rashes or itching  ENDOCRINE: Denies excessive thirst  Denies intolerance to heat or cold  PSYCHOSOCIAL: Denies depression or anxiety  Denies any recent memory loss         Historical Information   Past Medical History:   Diagnosis Date   • Allergic contact dermatitis due to plants, except food 4/26/2021   • Allergic rhinitis    • BMI 25 0-25 9,adult 11/23/2020   • Cancer ([facility] [address] ) 2008   • Ear problems    • GERD (gastroesophageal reflux disease)     last egd 2/2016   • Heart palpitations    • History of breast cancer 2008    right breast was on tamoxifen and was done in 3/2014   • History of mammogram 02/01/2018   • HL (hearing loss)    • Hyperlipidemia    • Immunization deficiency 5/27/2020   • Impaired fasting glucose 5/27/2020   • Prediabetes 11/23/2020   • Rash and nonspecific skin eruption 4/26/2021   • Tinnitus    • Vitamin D deficiency 5/27/2020    17     Past Surgical History:   Procedure Laterality Date   • BREAST LUMPECTOMY  01/01/2008   • COLONOSCOPY  01/01/2014   • COLONOSCOPY     • ENDOSCOPY OF POUCH  06/24/2019   • UPPER GASTROINTESTINAL ENDOSCOPY       Social History   Social History     Substance and Sexual Activity   Alcohol Use Never    Comment: ocassional      Social History     Substance and Sexual Activity   Drug Use Never     Social History     Tobacco Use   Smoking Status Never   Smokeless Tobacco Never     Family History   Problem Relation Age of Onset   • Hypertension Mother    • Heart disease Mother    • Hyperlipidemia Mother    • Skin cancer Father         basal call carcinoma    • Hyperlipidemia Father    • Heart disease Brother    • Breast cancer Maternal Aunt    • Esophageal cancer Maternal Uncle    • Liver disease Sister        Meds/Allergies       Current Outpatient Medications:   •  omeprazole (PriLOSEC) 20 mg delayed release capsule  •  rosuvastatin (CRESTOR) 20 MG tablet  •  Diclofenac Sodium (VOLTAREN) 1 %    Allergies   Allergen Reactions   • Other Other (See Comments)     Environmental           Objective     Blood pressure 124/71, pulse 71, height 5' 5" (1 651 m), weight 73 3 kg (161 lb 11 2 oz)  Body mass index is 26 91 kg/m²  PHYSICAL EXAM:      General Appearance:   Alert, cooperative, no distress   HEENT:   Normocephalic, atraumatic, anicteric      Neck:  Supple, symmetrical, trachea midline   Lungs:   Clear to auscultation bilaterally; no rales, rhonchi or wheezing; respirations unlabored    Heart[de-identified]   Regular rate and rhythm; no murmur, rub, or gallop  Abdomen:   Soft, non-tender, non-distended; normal bowel sounds; no masses, no organomegaly    Genitalia:   Deferred    Rectal:   Deferred    Extremities:  No cyanosis, clubbing or edema    Pulses:  2+ and symmetric    Skin:  No jaundice, rashes, or lesions    Lymph nodes:  No palpable cervical lymphadenopathy        Lab Results:   No visits with results within 1 Day(s) from this visit     Latest known visit with results is:   Appointment on 12/10/2022   Component Date Value   • WBC 12/10/2022 6 00    • RBC 12/10/2022 4 40    • Hemoglobin 12/10/2022 13 3    • Hematocrit 12/10/2022 39 9    • MCV 12/10/2022 91    • MCH 12/10/2022 30 2    • MCHC 12/10/2022 33 3    • RDW 12/10/2022 12 2    • MPV 12/10/2022 10 6    • Platelets 12/10/2022 174    • nRBC 12/10/2022 0    • Neutrophils Relative 12/10/2022 59    • Immat GRANS % 12/10/2022 1    • Lymphocytes Relative 12/10/2022 32    • Monocytes Relative 12/10/2022 5    • Eosinophils Relative 12/10/2022 2    • Basophils Relative 12/10/2022 1    • Neutrophils Absolute 12/10/2022 3 60    • Immature Grans Absolute 12/10/2022 0 03    • Lymphocytes Absolute 12/10/2022 1 93    • Monocytes Absolute 12/10/2022 0 30    • Eosinophils Absolute 12/10/2022 0 11    • Basophils Absolute 12/10/2022 0 03    • Sodium 12/10/2022 141    • Potassium 12/10/2022 4 1    • Chloride 12/10/2022 104    • CO2 12/10/2022 30    • ANION GAP 12/10/2022 7    • BUN 12/10/2022 11    • Creatinine 12/10/2022 0 70    • Glucose, Fasting 12/10/2022 103 (H)    • Calcium 12/10/2022 9 2    • AST 12/10/2022 14    • ALT 12/10/2022 13    • Alkaline Phosphatase 12/10/2022 68    • Total Protein 12/10/2022 7 5    • Albumin 12/10/2022 4 6    • Total Bilirubin 12/10/2022 0 43    • eGFR 12/10/2022 95    • Hemoglobin A1C 12/10/2022 5 4    • EAG 12/10/2022 108    • Cholesterol 12/10/2022 171    • Triglycerides 12/10/2022 53    • HDL, Direct 12/10/2022 58    • LDL Calculated 12/10/2022 102 (H)    • TSH 3RD GENERATON 12/10/2022 1 488    • Uric Acid 12/10/2022 3 7    • Vitamin B-12 12/10/2022 615    • Vit D, 25-Hydroxy 12/10/2022 31 8    • Vitamin B6 12/10/2022 10 0    • Amylase 12/10/2022 70    • Lipase 12/10/2022 22    • Lyme Total Antibodies 12/10/2022 0 3    • Sed Rate 12/10/2022 17    • KENISHA 12/10/2022 Negative    • Rheumatoid Factor 12/10/2022 Negative    • Vitamin B1, Whole Blood 12/10/2022 160 2          Radiology Results:   XR shoulder 2+ vw left    Result Date: 12/11/2022  Narrative: LEFT SHOULDER INDICATION:   M25 512: Pain in left shoulder  COMPARISON:  None VIEWS:  XR SHOULDER 2+ VW LEFT FINDINGS: There is no acute fracture or dislocation  Mild osteoarthritis acromioclavicular joint  No lytic or blastic osseous lesion   Soft tissues are unremarkable  Impression: No acute osseous abnormality  Workstation performed: [id number]   Answers for HPI/ROS submitted by the patient on 12/28/2022  Chronicity: new  Onset: 1 to 4 weeks ago  Onset quality: sudden  Frequency: daily  Progression since onset: waxing and waning  Pain location: LUQ, RUQ  Radiates to: back  anorexia: No  arthralgias: No  belching: No  constipation: No  diarrhea: No  dysuria: No  fever: No  flatus: No  frequency: No  headaches: No  hematochezia: No  hematuria: No  melena: No  myalgias: No  nausea:  No  weight loss: No  vomiting: No  Aggravated by: nothing  Relieved by: nothing

## 2023-01-03 ENCOUNTER — APPOINTMENT (OUTPATIENT)
Dept: LAB | Facility: AMBULARY SURGERY CENTER | Age: 58
End: 2023-01-03

## 2023-01-03 DIAGNOSIS — R10.10 PAIN OF UPPER ABDOMEN: ICD-10-CM

## 2023-01-03 LAB — HEMOCCULT STL QL IA: NEGATIVE

## 2023-01-04 ENCOUNTER — HOSPITAL ENCOUNTER (OUTPATIENT)
Dept: NON INVASIVE DIAGNOSTICS | Facility: HOSPITAL | Age: 58
Discharge: HOME | End: 2023-01-04
Attending: INTERNAL MEDICINE

## 2023-01-04 VITALS — HEIGHT: 65 IN | BODY MASS INDEX: 26.82 KG/M2 | WEIGHT: 161 LBS

## 2023-01-04 VITALS
BODY MASS INDEX: 26.82 KG/M2 | HEART RATE: 71 BPM | SYSTOLIC BLOOD PRESSURE: 124 MMHG | WEIGHT: 161 LBS | DIASTOLIC BLOOD PRESSURE: 71 MMHG | HEIGHT: 65 IN

## 2023-01-04 DIAGNOSIS — R07.9 CHEST PAIN, UNSPECIFIED TYPE: ICD-10-CM

## 2023-01-04 DIAGNOSIS — Z82.49 FAMILY HISTORY OF EARLY CAD: ICD-10-CM

## 2023-01-04 LAB
AORTIC ROOT: 3 CM
APICAL FOUR CHAMBER EJECTION FRACTION: 71 %
CHEST PAIN STATEMENT: NORMAL
E WAVE DECELERATION TIME: 180 MS
FRACTIONAL SHORTENING: 37 % (ref 28–44)
INTERVENTRICULAR SEPTUM IN DIASTOLE (PARASTERNAL SHORT AXIS VIEW): 0.8 CM
INTERVENTRICULAR SEPTUM: 0.8 CM (ref 0.6–1.1)
LAAS-AP4: 15.9 CM2
LEFT ATRIUM SIZE: 3.6 CM
LEFT INTERNAL DIMENSION IN SYSTOLE: 2.9 CM (ref 2.1–4)
LEFT VENTRICULAR INTERNAL DIMENSION IN DIASTOLE: 4.6 CM (ref 3.5–6)
LEFT VENTRICULAR POSTERIOR WALL IN END DIASTOLE: 1 CM
LEFT VENTRICULAR STROKE VOLUME: 63 ML
LVSV (TEICH): 63 ML
MAX DIASTOLIC BP: 68 MMHG
MAX HEART RATE: 166 BPM
MAX HR PERCENT: 103 %
MAX HR: 166 BPM
MAX PREDICTED HEART RATE: 161 BPM
MAX. SYSTOLIC BP: 154 MMHG
MV E'TISSUE VEL-SEP: 10 CM/S
MV PEAK A VEL: 0.78 M/S
MV PEAK E VEL: 77 CM/S
PROTOCOL NAME: NORMAL
RATE PRESSURE PRODUCT: NORMAL
REASON FOR TERMINATION: NORMAL
RIGHT ATRIAL 2D VOLUME: 22 ML
RIGHT ATRIUM AREA SYSTOLE A4C: 10.6 CM2
RIGHT VENTRICLE ID DIMENSION: 3 CM
SL CV LV EF: 65
SL CV PED ECHO LEFT VENTRICLE DIASTOLIC VOLUME (MOD BIPLANE) 2D: 96 ML
SL CV PED ECHO LEFT VENTRICLE SYSTOLIC VOLUME (MOD BIPLANE) 2D: 33 ML
SL CV STRESS RECOVERY BP: NORMAL MMHG
SL CV STRESS RECOVERY HR: 92 BPM
SL CV STRESS RECOVERY O2 SAT: 96 %
SL CV STRESS STAGE REACHED: 4
STRESS ANGINA INDEX: 0
STRESS BASELINE BP: NORMAL MMHG
STRESS BASELINE HR: 86 BPM
STRESS O2 SAT REST: 99 %
STRESS PEAK HR: 166 BPM
STRESS POST ESTIMATED WORKLOAD: 11.7 METS
STRESS POST EXERCISE DUR MIN: 10 MIN
STRESS POST EXERCISE DUR SEC: 1 SEC
STRESS POST O2 SAT PEAK: 99 %
STRESS POST PEAK BP: 154 MMHG
TARGET HR FORMULA: NORMAL
TEST INDICATION: NORMAL
TIME IN EXERCISE PHASE: NORMAL
TR MAX PG: 24 MMHG
TR PEAK VELOCITY: 2.5 M/S
TRICUSPID VALVE PEAK REGURGITATION VELOCITY: 2.46 M/S

## 2023-01-07 ENCOUNTER — HOSPITAL ENCOUNTER (OUTPATIENT)
Dept: ULTRASOUND IMAGING | Facility: HOSPITAL | Age: 58
Discharge: HOME | End: 2023-01-07
Attending: INTERNAL MEDICINE

## 2023-01-07 DIAGNOSIS — K29.70 GASTRITIS WITHOUT BLEEDING, UNSPECIFIED CHRONICITY, UNSPECIFIED GASTRITIS TYPE: ICD-10-CM

## 2023-01-07 DIAGNOSIS — R10.10 PAIN OF UPPER ABDOMEN: ICD-10-CM

## 2023-01-11 ENCOUNTER — HOSPITAL ENCOUNTER (OUTPATIENT)
Dept: RADIOLOGY | Facility: MEDICAL CENTER | Age: 58
Discharge: HOME | End: 2023-01-11

## 2023-01-11 DIAGNOSIS — Z85.3 HISTORY OF BREAST CANCER: ICD-10-CM

## 2023-01-11 DIAGNOSIS — Z78.0 POST-MENOPAUSAL: ICD-10-CM

## 2023-01-11 DIAGNOSIS — Z13.820 SCREENING FOR OSTEOPOROSIS: ICD-10-CM

## 2023-01-13 ENCOUNTER — TELEPHONE (OUTPATIENT)
Dept: FAMILY MEDICINE CLINIC | Facility: CLINIC | Age: 58
End: 2023-01-13

## 2023-01-13 NOTE — TELEPHONE ENCOUNTER
----- Message from Darian Doherty MD sent at 1/11/2023  6:16 PM EST -----  Please let pt know the bone density test show osteopenia=weak bone mass  Recommend weight bearing exercise, vitamin d3 2000 units daily and calcium 600 mg daily  We'll repeat bone density in 2 years

## 2023-01-13 NOTE — TELEPHONE ENCOUNTER
Called pt and informed of osteopenia  Also advised pt to begin taking 2000 Units of D3 with 600 mg of Calcium daily as per PCP request  Informed pt this bone density screening will be repeated in 2 years

## 2023-02-09 ENCOUNTER — TELEPHONE (OUTPATIENT)
Dept: GASTROENTEROLOGY | Facility: CLINIC | Age: 58
End: 2023-02-09

## 2023-02-09 NOTE — TELEPHONE ENCOUNTER
Left message reminding patient of her EGD with Dr Marimar Perry at [facility]  She will need a , will be called day prior with arrival time and nothing to eat after midnight and is allowed clear liquids up to 4 hours prior  Asked to call if any questions

## 2023-02-10 ENCOUNTER — OFFICE VISIT (OUTPATIENT)
Dept: CARDIOLOGY CLINIC | Facility: CLINIC | Age: 58
End: 2023-02-10

## 2023-02-10 VITALS
WEIGHT: 162 LBS | TEMPERATURE: 97.6 F | SYSTOLIC BLOOD PRESSURE: 120 MMHG | OXYGEN SATURATION: 98 % | HEART RATE: 91 BPM | HEIGHT: 65 IN | BODY MASS INDEX: 26.99 KG/M2 | DIASTOLIC BLOOD PRESSURE: 70 MMHG

## 2023-02-10 DIAGNOSIS — E66.3 OVERWEIGHT (BMI 25.0-29.9): ICD-10-CM

## 2023-02-10 DIAGNOSIS — M54.14 THORACIC RADICULOPATHY: ICD-10-CM

## 2023-02-10 DIAGNOSIS — Z82.49 FAMILY HISTORY OF EARLY CAD: Primary | ICD-10-CM

## 2023-02-10 DIAGNOSIS — E78.00 PURE HYPERCHOLESTEROLEMIA: ICD-10-CM

## 2023-02-10 NOTE — PROGRESS NOTES
[facility] [address]  [place] [address]  Phone#  [phone]  Fax#  [phone]                                               Cardiology Office Follow up  Kathryn Carroll, 59 y o  female  YOB: 1965  MRN: [id number] Encounter: [id number]      PCP - Darian Meyers MD  Referring Provider - No ref  provider found    Chief Complaint   Patient presents with   • Follow-up       Assessment  Chest/ Left shoulder pain  Back pain  Hyperlipidemia  H/o breast cancer  S/p lumpectomy + chemotherapy + radiation (2008, [facility])  oncotype score 22  Family history of early CAD  GERD  Overweight, Body mass index is 26 96 kg/m²  Plan  Chest / Left shoulder pain, Family history of early CAD  Atypical symptoms, no clear exertional symptoms - overall improving  Testing reviewed today:  Exercise ECG stress test - 1/4/2023 - reviewed today  did 10:01 min, 11 7 METS, no chest pain with exercise, equivocal upsloping ST depression which resolved in early recovery  Overall, no diagnostic evidence of ischemia after exercise  TTE - 1/4/23 - LVEF 65%, no significant valvular abnormalities  XR thoracic spine - 2/8/23 at [facility] 0 mild, age-indeterminate likely chronic lower thoracic compression deformity of T8  Overall symptoms appear to be musculoskeletal / related to thoracic radiculopathy, and are improving  Continue using Tylenol as needed for pain and follow-up with PCP / neurosurgery    Hyperlipidemia, Obesity - Body mass index is 26 96 kg/m²  Overview  On statins since age 42, tried to stop it in between - but cholesterol went up to 280s in the past  Strong family history of hyperlipidemia not family member  Management  Continue rosuvastatin 20 mg daily  Dietary modifications, weight loss, increase exercise  Increase exercise to include walking 30 minutes 5 days a week for a total of 150 minutes/week    No results found for this visit on 02/10/23      No orders of the defined types were placed in this encounter  She will monitor her symptoms and follow-up with us as needed  If she has any worsening or new chest pain symptoms, then she will call us back for follow-up  Return if symptoms worsen or fail to improve  History of Present Illness   59 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 , who works at a salon, comes in as a new patient for consultation regarding symptoms of chest/left shoulder pain and back pain  She reports two sites of particular discomfort  1) She reports pain on the left side around her shoulder extending into the left upper chest wall region, which has been present intermittently  There is no clear aggravating or relieving factor for this  It does not change with any change in position of the shoulder or arm  She was evaluated for this with a shoulder x-ray and was found to have mild left acromioclavicular arthritis  She has not needed any medical therapy for this  2) Additionally, she reports symptoms of chest discomfort or bilateral lower chest wall region, as well as the corresponding region in the back  He described it as an achiness sensation  No clear aggravating or relieving factors  It is usually brief and lasts for a minute or 2 and then resolves on its own without additional therapy  No associated symptoms of nausea, vomiting or diaphoresis  She does have a strong family history of early CAD in her mother and brother as well as a longstanding history of severe hyperlipidemia for which she is on statin therapy since the age of 42  She notes that her mother also had good pain is a sign of early CAD and as a result she has been concerned and is here for cardiac evaluation  Family history: Mother had heart attack at 64, Hyperlipidemia  Brother had heart attack at 61, Hyperlipidemia    Interval history - 2/10/2023  She was back for follow-up after about 2 months    In the interim she has completed the stress testing and echocardiogram, both of which were without any major abnormalities  She did not have any reproduction of chest pain during the exercise and reports that her chest pain symptoms are otherwise improving  She was found to have T8 thoracic compression fracture, and believes her right-sided chest wall discomfort is related to the same, and is following with a neurosurgeon          Historical Information   Past Medical History:   Diagnosis Date   • Allergic contact dermatitis due to plants, except food 4/26/2021   • Allergic rhinitis    • BMI 25 0-25 9,adult 11/23/2020   • Cancer ([address] ) 2008   • Ear problems    • GERD (gastroesophageal reflux disease)     last egd 2/2016   • Heart palpitations    • History of breast cancer 2008    right breast was on tamoxifen and was done in 3/2014   • History of mammogram 02/01/2018   • HL (hearing loss)    • Hyperlipidemia    • Immunization deficiency 5/27/2020   • Impaired fasting glucose 5/27/2020   • Prediabetes 11/23/2020   • Rash and nonspecific skin eruption 4/26/2021   • Tinnitus    • Vitamin D deficiency 5/27/2020    17     Past Surgical History:   Procedure Laterality Date   • BREAST LUMPECTOMY  01/01/2008   • COLONOSCOPY  01/01/2014   • COLONOSCOPY     • ENDOSCOPY OF POUCH  06/24/2019   • UPPER GASTROINTESTINAL ENDOSCOPY       Family History   Problem Relation Age of Onset   • Hypertension Mother    • Heart disease Mother    • Hyperlipidemia Mother    • Skin cancer Father         basal call carcinoma    • Hyperlipidemia Father    • Heart disease Brother    • Breast cancer Maternal Aunt    • Esophageal cancer Maternal Uncle    • Liver disease Sister      Current Outpatient Medications on File Prior to Visit   Medication Sig Dispense Refill   • Diclofenac Sodium (VOLTAREN) 1 % Apply 2 g topically 4 (four) times a day (Patient not taking: Reported on 12/29/2022) 100 g 1   • omeprazole (PriLOSEC) 20 mg delayed release capsule Take 1 capsule (20 mg total) by mouth daily 90 capsule 1   • rosuvastatin (CRESTOR) 20 MG tablet TAKE ONE-HALF TABLET BY  MOUTH DAILY 45 tablet 3     No current facility-administered medications on file prior to visit  Allergies   Allergen Reactions   • Other Other (See Comments)     Environmental     Social History     Socioeconomic History   • Marital status: /Civil Union     Spouse name: None   • Number of children: None   • Years of education: None   • Highest education level: None   Occupational History   • None   Tobacco Use   • Smoking status: Never   • Smokeless tobacco: Never   Vaping Use   • Vaping Use: None   Substance and Sexual Activity   • Alcohol use: Never     Comment: ocassional    • Drug use: Never   • Sexual activity: Yes     Partners: Male   Other Topics Concern   • None   Social History Narrative    Most recent tobacco use screenin2019      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      Marital status:         Exercise level:   None      Diet:   Regular      Caffeine intake:   Occasional      Seat belts used routinely:   Yes      Smoke alarm in home:   Yes      Please enter the date of the Patient's previous mammogram :   2018      Date of last Colonoscopy:   5 years ago  is due now  Social Determinants of Health     Financial Resource Strain: Not on file   Food Insecurity: Not on file   Transportation Needs: Not on file   Physical Activity: Not on file   Stress: Not on file   Social Connections: Not on file   Intimate Partner Violence: Not on file   Housing Stability: Not on file        Review of Systems   All other systems reviewed and are negative  Vitals:  Vitals:    02/10/23 1356   BP: 120/70   BP Location: Left arm   Patient Position: Standing   Cuff Size: Standard   Pulse: 91   Temp: 97 6 °F (36 4 °C)   SpO2: 98%   Weight: 73 5 kg (162 lb)   Height: 5' 5" (1 651 m)     BMI - Body mass index is 26 96 kg/m²    Wt Readings from Last 7 Encounters:   02/10/23 73 5 kg (162 lb)   23 73 kg (161 lb)   23 73 kg (161 lb)   22 73 3 kg (161 lb 11 2 oz)   12/14/22 73 kg (161 lb)   12/07/22 73 kg (161 lb)   10/31/22 73 9 kg (163 lb)       Physical Exam  Vitals and nursing note reviewed  Constitutional:       General: She is not in acute distress  Appearance: Normal appearance  She is well-developed  She is not ill-appearing or diaphoretic  HENT:      Head: Normocephalic and atraumatic  Nose: No congestion  Eyes:      General: No scleral icterus  Conjunctiva/sclera: Conjunctivae normal    Neck:      Vascular: No carotid bruit or JVD  Cardiovascular:      Rate and Rhythm: Normal rate and regular rhythm  Pulses: Normal pulses  Heart sounds: Normal heart sounds  No murmur heard  No friction rub  No gallop  Pulmonary:      Effort: Pulmonary effort is normal  No respiratory distress  Breath sounds: Normal breath sounds  No rales  Abdominal:      General: There is no distension  Palpations: Abdomen is soft  Tenderness: There is no abdominal tenderness  Musculoskeletal:         General: No swelling or tenderness  Cervical back: Neck supple  Right lower leg: No edema  Left lower leg: No edema  Skin:     General: Skin is warm  Neurological:      General: No focal deficit present  Mental Status: She is alert and oriented to person, place, and time  Mental status is at baseline  Psychiatric:         Mood and Affect: Mood normal          Behavior: Behavior normal          Thought Content:  Thought content normal            Labs:  CBC:   Lab Results   Component Value Date    WBC 6 00 12/10/2022    RBC 4 40 12/10/2022    HGB 13 3 12/10/2022    HCT 39 9 12/10/2022    MCV 91 12/10/2022     12/10/2022    RDW 12 2 12/10/2022       CMP:   Lab Results   Component Value Date    K 4 1 12/10/2022     12/10/2022    CO2 30 12/10/2022    BUN 11 12/10/2022    CREATININE 0 70 12/10/2022    EGFR 95 12/10/2022    CALCIUM 9 2 12/10/2022    AST 14 12/10/2022    ALT 13 12/10/2022    ALKPHOS 68 12/10/2022       Magnesium:  No results found for: MG    Lipid Profile:   Lab Results   Component Value Date    HDL 58 12/10/2022    TRIG 53 12/10/2022    LDLCALC 102 (H) 12/10/2022       Thyroid Studies:   Lab Results   Component Value Date    CQW0NNTXHALZ 1 488 12/10/2022       A1c:  No components found for: HGA1C    INR:  No results found for: VUO8    Imaging: XR shoulder 2+ vw left    Result Date: 12/11/2022  Narrative: LEFT SHOULDER INDICATION:   M25 512: Pain in left shoulder  COMPARISON:  None VIEWS:  XR SHOULDER 2+ VW LEFT FINDINGS: There is no acute fracture or dislocation  Mild osteoarthritis acromioclavicular joint  No lytic or blastic osseous lesion  Soft tissues are unremarkable  Impression: No acute osseous abnormality  Workstation performed: [id number]       Cardiac testing:   No results found for this or any previous visit  No results found for this or any previous visit  No results found for this or any previous visit  No results found for this or any previous visit  DXA bone density spine hip and pelvis  Narrative: DXA SCAN    CLINICAL HISTORY:  61-year-old postmenopausal female  OTHER RISK FACTORS:  Broken bone as a result of a minor injury, takes PPIs  PHARMACOLOGIC THERAPY FOR OSTEOPOROSIS:  None  TECHNIQUE: Bone densitometry was performed using a [employer] A  bone densitometer  Regions of interest appear properly placed  COMPARISON: There are no prior DXA studies performed on this unit for comparison  RESULTS:     LUMBAR SPINE  Level: L1, L2, L4  (L3 vertebra excluded from analysis due to local structural abnormalities or artifact) :   BMD:  0 910  gm/cm2   T-score: -1 1     LEFT  TOTAL HIP:   BMD:  0 883  gm/cm2   T-score:  -0 5    LEFT  FEMORAL NECK:   BMD:  0 639  gm/cm2   T score: -1 9   Impression: 1  Low bone mass (osteopenia)      2   The 10 year risk of hip fracture is 1 7% with the 10 year risk of major osteoporotic fracture being 15% as calculated by the [address] fracture risk assessment tool (FRAX, which is based on data generated by the [facility]   for Metabolic Bone Diseases)  3   The current NOF guidelines recommend treating patients with a T-score of -2 5 or less in the lumbar spine or hips, or in post-menopausal women and men over the age of 48 with low bone mass (osteopenia) and a FRAX 10 year risk score of >3% for hip   fracture and/or >20% for major osteoporotic fracture  4   The NOF recommends follow-up DXA in 1-2 years after initiating therapy for osteoporosis and every 2 years thereafter  More frequent evaluation is appropriate for patients with conditions associated with rapid bone loss, such as glucocorticoid   therapy  The interval between DXA screenings may be longer for individuals without major risk factors and initial T-score in the normal or upper low bone mass range  The FRAX algorithm has certain limitations:  -FRAX has not been validated in patients currently or previously treated with pharmacotherapy for osteoporosis  In such patients, clinical judgment must be exercised in interpreting FRAX scores  -Prior hip, vertebral and humeral fragility fractures appear to confer greater risk of subsequent fracture than fractures at other sites (this is especially true for individuals with severe vertebral fractures), but quantification of this incremental   risk is not possible with FRAX  -FRAX underestimates fracture risk in patients with history of multiple fragility fractures  -FRAX may underestimate fracture risk in patients with history of frequent falls   -It is not appropriate to use FRAX to monitor treatment response      WHO CLASSIFICATION:  Normal (a T-score of -1 0 or higher)  Low bone mineral density (a T-score of less than -1 0 but higher than -2 5)  Osteoporosis (a T-score of -2 5 or less)  Severe osteoporosis (a T-score of -2 5 or less with a fragility fracture)    Workstation performed: [id number]

## 2023-02-22 ENCOUNTER — HOSPITAL ENCOUNTER (OUTPATIENT)
Dept: GASTROENTEROLOGY | Facility: AMBULATORY SURGERY CENTER | Age: 58
Discharge: HOME | End: 2023-02-22

## 2023-02-22 ENCOUNTER — ANESTHESIA EVENT (OUTPATIENT)
Dept: GASTROENTEROLOGY | Facility: AMBULATORY SURGERY CENTER | Age: 58
End: 2023-02-22

## 2023-02-22 ENCOUNTER — ANESTHESIA (OUTPATIENT)
Dept: GASTROENTEROLOGY | Facility: AMBULATORY SURGERY CENTER | Age: 58
End: 2023-02-22

## 2023-02-22 VITALS
HEART RATE: 70 BPM | OXYGEN SATURATION: 98 % | SYSTOLIC BLOOD PRESSURE: 100 MMHG | HEIGHT: 65 IN | BODY MASS INDEX: 26.99 KG/M2 | WEIGHT: 162 LBS | DIASTOLIC BLOOD PRESSURE: 56 MMHG | TEMPERATURE: 96.9 F | RESPIRATION RATE: 18 BRPM

## 2023-02-22 DIAGNOSIS — K29.70 GASTRITIS WITHOUT BLEEDING, UNSPECIFIED CHRONICITY, UNSPECIFIED GASTRITIS TYPE: ICD-10-CM

## 2023-02-22 DIAGNOSIS — K31.7 GASTRIC POLYP: ICD-10-CM

## 2023-02-22 DIAGNOSIS — R10.10 PAIN OF UPPER ABDOMEN: ICD-10-CM

## 2023-02-22 RX ORDER — SODIUM CHLORIDE, SODIUM LACTATE, POTASSIUM CHLORIDE, CALCIUM CHLORIDE 600; 310; 30; 20 MG/100ML; MG/100ML; MG/100ML; MG/100ML
20 INJECTION, SOLUTION INTRAVENOUS CONTINUOUS
Status: DISCONTINUED | OUTPATIENT
Start: 2023-02-22 | End: 2023-02-26 | Stop reason: HOSPADM

## 2023-02-22 RX ORDER — PROPOFOL 10 MG/ML
INJECTION, EMULSION INTRAVENOUS AS NEEDED
Status: DISCONTINUED | OUTPATIENT
Start: 2023-02-22 | End: 2023-02-22

## 2023-02-22 RX ORDER — SODIUM CHLORIDE, SODIUM LACTATE, POTASSIUM CHLORIDE, CALCIUM CHLORIDE 600; 310; 30; 20 MG/100ML; MG/100ML; MG/100ML; MG/100ML
125 INJECTION, SOLUTION INTRAVENOUS CONTINUOUS
Status: DISCONTINUED | OUTPATIENT
Start: 2023-02-22 | End: 2023-02-26 | Stop reason: HOSPADM

## 2023-02-22 RX ORDER — LIDOCAINE HYDROCHLORIDE 10 MG/ML
INJECTION, SOLUTION EPIDURAL; INFILTRATION; INTRACAUDAL; PERINEURAL AS NEEDED
Status: DISCONTINUED | OUTPATIENT
Start: 2023-02-22 | End: 2023-02-22

## 2023-02-22 RX ADMIN — PROPOFOL 50 MG: 10 INJECTION, EMULSION INTRAVENOUS at 10:23

## 2023-02-22 RX ADMIN — SODIUM CHLORIDE, SODIUM LACTATE, POTASSIUM CHLORIDE, CALCIUM CHLORIDE: 600; 310; 30; 20 INJECTION, SOLUTION INTRAVENOUS at 09:37

## 2023-02-22 RX ADMIN — PROPOFOL 130 MG: 10 INJECTION, EMULSION INTRAVENOUS at 10:21

## 2023-02-22 RX ADMIN — LIDOCAINE HYDROCHLORIDE 100 MG: 10 INJECTION, SOLUTION EPIDURAL; INFILTRATION; INTRACAUDAL; PERINEURAL at 10:21

## 2023-02-22 RX ADMIN — PROPOFOL 40 MG: 10 INJECTION, EMULSION INTRAVENOUS at 10:26

## 2023-02-22 RX ADMIN — PROPOFOL 30 MG: 10 INJECTION, EMULSION INTRAVENOUS at 10:28

## 2023-02-22 NOTE — INTERVAL H&P NOTE
H&P reviewed  After examining the patient I find no changes in the patients condition since the H&P had been written      Vitals:    02/22/23 0948   BP: 114/63   Pulse: 71   Resp: 18   Temp: (!) 96 9 °F (36 1 °C)   SpO2: 96%

## 2023-02-22 NOTE — DISCHARGE SUMMARY
Discharge Summary - Tara Esteban 59 y o  female MRN: [id number]    Unit/Bed#:  Encounter: [id number]    Admission Date: 2/22/2023    Admitting Diagnosis: Pain of upper abdomen [R10 10]  Gastritis without bleeding, unspecified chronicity, unspecified gastritis type [K29 70]  Gastric polyp [K31 7]    HPI: History of gastroesophageal reflux disease  Abdominal pain  Procedures Performed: No orders of the defined types were placed in this encounter  Summary of Hospital Course: Tolerated procedure well  Significant Findings, Care, Treatment and Services Provided: No cause of abdominal pain identified  This may be referred pain from the back  Multiple gastric polyp noted  Reflux present    Complications: None    Discharge Diagnosis: See above    Medical Problems     Resolved Problems  Date Reviewed: 2/10/2023   None         Condition at Discharge: good         Discharge instructions/Information to patient and family:   See after visit summary for information provided to patient and family  Provisions for Follow-Up Care:  See after visit summary for information related to follow-up care and any pertinent home health orders        PCP: Darian Nye MD    Disposition: Home

## 2023-02-22 NOTE — H&P
History and Physical - [facility]  Tobias Jacobo 59 y o  female MRN: [id number]                  HPI: Tobias Jacobo is a 59y o  year old female who presents for epigastric pain  Possible peptic ulcer disease  REVIEW OF SYSTEMS: Per the HPI, and otherwise unremarkable      Historical Information   Past Medical History:   Diagnosis Date   • Allergic contact dermatitis due to plants, except food 04/26/2021   • Allergic rhinitis    • BMI 25 0-25 9,adult 11/23/2020   • Cancer ([address] ) 2008   • Ear problems    • Fatty liver    • Fracture of T8 vertebra (HCC)     hairline fracture   • Gastric polyps    • Gastritis    • GERD (gastroesophageal reflux disease)     last egd 2/2016   • Heart palpitations    • History of breast cancer 2008    right breast was on tamoxifen and was done in 3/2014   • History of mammogram 02/01/2018   • HL (hearing loss)    • Hyperlipidemia    • Immunization deficiency 05/27/2020   • Impaired fasting glucose 05/27/2020   • Prediabetes 11/23/2020   • Rash and nonspecific skin eruption 04/26/2021   • Tinnitus    • Upper abdominal pain    • Vitamin D deficiency 05/27/2020    17     Past Surgical History:   Procedure Laterality Date   • BREAST LUMPECTOMY  01/01/2008   • COLONOSCOPY  01/01/2014   • COLONOSCOPY     • ENDOSCOPY OF POUCH  06/24/2019   • UPPER GASTROINTESTINAL ENDOSCOPY       Social History   Social History     Substance and Sexual Activity   Alcohol Use Yes    Comment: ocassional -special occasion only 1/year     Social History     Substance and Sexual Activity   Drug Use Never     Social History     Tobacco Use   Smoking Status Never   Smokeless Tobacco Never     Family History   Problem Relation Age of Onset   • Hypertension Mother    • Heart disease Mother    • Hyperlipidemia Mother    • Skin cancer Father         basal call carcinoma    • Hyperlipidemia Father    • Liver disease Sister    • Heart disease Brother    • Breast cancer Maternal Aunt    • Esophageal cancer Maternal Uncle    • Esophageal cancer Paternal Aunt        Meds/Allergies       Current Outpatient Medications:   •  Cholecalciferol (VITAMIN D3 PO)  •  omeprazole (PriLOSEC) 20 mg delayed release capsule  •  rosuvastatin (CRESTOR) 20 MG tablet    Current Facility-Administered Medications:   •  lactated ringers infusion, 125 mL/hr, Intravenous, Continuous, New Bag at 02/22/23 7377    Allergies   Allergen Reactions   • Other Other (See Comments)     Environmental       Objective     /63   Pulse 71   Temp (!) 96 9 °F (36 1 °C) (Skin)   Resp 18   Ht 5' 4 5" (1 638 m)   Wt 73 5 kg (162 lb)   LMP  (LMP Unknown) Comment: LMP 2009  SpO2 96%   BMI 27 38 kg/m²       PHYSICAL EXAM    Gen: NAD  Head: NCAT  CV: RRR  CHEST: Clear  ABD: soft, NT/ND  EXT: no edema      ASSESSMENT/PLAN:  This is a 59y o  year old female here for EGD, and she is stable and optimized for her procedure

## 2023-02-22 NOTE — ANESTHESIA POSTPROCEDURE EVALUATION
Post-Op Assessment Note    CV Status:  Stable  Pain Score: 0    Pain management: adequate     Mental Status:  Alert and awake   Hydration Status:  Euvolemic   PONV Controlled:  Controlled   Airway Patency:  Patent      Post Op Vitals Reviewed: Yes      Staff: CRNA         No notable events documented      BP   109/64   Temp     Pulse  74   Resp   10   SpO2  96

## 2023-03-23 ENCOUNTER — TELEPHONE (OUTPATIENT)
Dept: GASTROENTEROLOGY | Facility: CLINIC | Age: 58
End: 2023-03-23

## 2023-03-23 NOTE — TELEPHONE ENCOUNTER
Patients GI provider:  Dr Genet Rodríguez    Number to return call: [phone]    Reason for call: Pt called in requesting to speak to someone about her EGD results  Above is her number       Scheduled procedure/appointment date if applicable: Apt/procedure NA

## 2023-03-24 NOTE — TELEPHONE ENCOUNTER
I called and lmom for [facility] letting her know that we did go over her results with her  yesterday(leslie colonay)-advised her to call back with any other questions or concerns

## 2023-06-07 ENCOUNTER — OFFICE VISIT (OUTPATIENT)
Dept: FAMILY MEDICINE CLINIC | Facility: CLINIC | Age: 58
End: 2023-06-07
Payer: COMMERCIAL

## 2023-06-07 VITALS
TEMPERATURE: 99.1 F | HEIGHT: 65 IN | SYSTOLIC BLOOD PRESSURE: 110 MMHG | RESPIRATION RATE: 18 BRPM | HEART RATE: 84 BPM | WEIGHT: 162 LBS | DIASTOLIC BLOOD PRESSURE: 60 MMHG | OXYGEN SATURATION: 97 % | BODY MASS INDEX: 26.99 KG/M2

## 2023-06-07 DIAGNOSIS — R07.81 RIB PAIN: ICD-10-CM

## 2023-06-07 DIAGNOSIS — F51.01 PRIMARY INSOMNIA: Primary | ICD-10-CM

## 2023-06-07 DIAGNOSIS — G95.9 DISEASE OF SPINAL CORD (HCC): ICD-10-CM

## 2023-06-07 DIAGNOSIS — Z87.81 HISTORY OF VERTEBRAL FRACTURE: ICD-10-CM

## 2023-06-07 DIAGNOSIS — H69.81 DYSFUNCTION OF RIGHT EUSTACHIAN TUBE: ICD-10-CM

## 2023-06-07 DIAGNOSIS — Z76.89 ENCOUNTER TO ESTABLISH CARE: ICD-10-CM

## 2023-06-07 PROCEDURE — 99214 OFFICE O/P EST MOD 30 MIN: CPT | Performed by: NURSE PRACTITIONER

## 2023-06-07 NOTE — PROGRESS NOTES
Assessment/Plan:    1  Primary insomnia  Comments:  melatonin ineffective    2  History of vertebral fracture    3  Dysfunction of right eustachian tube  Comments:  on nasal steroid  sees ENT    4  Encounter to establish care    5  Disease of spinal cord (HCC)  Comments:  remote history of T8 fracture    6  Rib pain        The case discussed with patient using patient centered shared decision making  The patient was counseled regarding instructions for management,-- risk factor reductions,-- prognosis,-- impressions,-- risks and benefits of treatment options,-- importance of compliance with treatment  I have reviewed the instructions with the patient, answering all questions to her satisfaction  Patient clinically stable at this time  Cont with current plan of care  Will consider PT for posture retraining  Recommend further imaging-MRI, PET scan for rare rib pain-pt declines  Cautious use of CBD for insomnia-get a reliable brand  RTO as recommended and PRN          Return in about 6 months (around 12/7/2023) for Annual physical     I have spent a total time of 355 minutes on 06/07/23 in caring for this patient including Diagnostic results, Prognosis, Risks and benefits of tx options, Instructions for management, Patient and family education, Importance of tx compliance, Risk factor reductions, Impressions, Counseling / Coordination of care, Documenting in the medical record, Reviewing / ordering tests, medicine, procedures   and Obtaining or reviewing history    Subjective:      Patient ID: Marciano Campbell is a 59 y o  female  Chief Complaint   Patient presents with   • Establish Care     Establish care   • Earache     Right side, was seen by ENT but no improvement  • Rib Pain     Bilateral  Recently DX with diverticulitis and also had T8 fracture in the past     • Insomnia     Patient states she occasionally takes CBD gummies to help  Wants to discuss use of CBD          60 yo female here to establish  Has multi c/o    1  Ongoing nasal congestion, ear pain and pressure-allergic  Saw ent yesterday-was prescribed nasal spray  Right ear still painful    2  Has occasional short lived pain of lower rib region  Ongoing going for few years  Was treated at [facility] ED for diverticulitis in 5/2023  Work up upper abdominal region neg    She has h/o T8 fracture after falling on ice    3  Insomnia  Long standing  Wants to avoid prescriptive med  Melatonin ineffective  Has trialed cbd which is helpful    Reviewed medical history in detail  Changes to medical record changed where appropriate  Reviewed medications  Patient taking as prescribed  Tolerating well  Denies Side effects  Reviewed recent visits with specialists co-managing chronic conditions  The following portions of the patient's history were reviewed and updated as appropriate: allergies, current medications, past family history, past medical history, past social history, past surgical history and problem list     Review of Systems   Constitutional: Negative for fatigue, fever and unexpected weight change  HENT: Negative for trouble swallowing  Respiratory: Negative for shortness of breath  Cardiovascular: Negative for chest pain, palpitations and leg swelling  Gastrointestinal: Negative for blood in stool  Endocrine: Negative  Genitourinary: Negative for difficulty urinating and hematuria  Musculoskeletal: Positive for back pain  Chronic intermittent Rib pain-bottom ribs   Skin: Negative for pallor  Neurological: Negative for dizziness, tremors, seizures, syncope and weakness  Psychiatric/Behavioral: Positive for sleep disturbance  Negative for confusion and dysphoric mood  The patient is not nervous/anxious            Current Outpatient Medications   Medication Sig Dispense Refill   • Olopatadine-Mometasone (Ryaltris) 665-25 MCG/ACT SUSP 1 spray into each nostril 2 (two) times a day 29 g 6   • omeprazole (PriLOSEC) 20 mg delayed release "capsule Take 1 capsule (20 mg total) by mouth daily 90 capsule 1   • rosuvastatin (CRESTOR) 20 MG tablet TAKE ONE-HALF TABLET BY  MOUTH DAILY 45 tablet 3   • Cholecalciferol (VITAMIN D3 PO) Take by mouth (Patient not taking: Reported on 6/7/2023)       No current facility-administered medications for this visit  Patient Active Problem List   Diagnosis   • Hyperlipidemia   • History of breast cancer   • Heart palpitations   • History of mammogram   • Immunization deficiency   • Vitamin D deficiency   • Impaired fasting glucose   • Prediabetes   • BMI 25 0-25 9,adult   • Rash and nonspecific skin eruption   • Allergic contact dermatitis due to plants, except food   • Actinic keratosis   • Seborrheic keratosis, inflamed   • COVID   • Disease of spinal cord (HCC)   • Family history of early CAD   • Epigastric pain   • Acute pain of left shoulder       Objective:    /60 (BP Location: Left arm, Patient Position: Sitting, Cuff Size: Standard)   Pulse 84   Temp 99 1 °F (37 3 °C) (Tympanic)   Resp 18   Ht 5' 4 5\" (1 638 m)   Wt 73 5 kg (162 lb)   LMP  (LMP Unknown) Comment: LMP 2009  SpO2 97%   BMI 27 38 kg/m²        Physical Exam  Vitals and nursing note reviewed  Constitutional:       General: She is not in acute distress  Appearance: Normal appearance  HENT:      Head: Normocephalic and atraumatic  Neck:      Vascular: No carotid bruit  Cardiovascular:      Rate and Rhythm: Normal rate and regular rhythm  No extrasystoles are present  Pulses: Normal pulses  Heart sounds: Normal heart sounds  No gallop  No S3 or S4 sounds  Pulmonary:      Effort: Pulmonary effort is normal       Breath sounds: Normal breath sounds  Chest:      Chest wall: No mass, deformity or tenderness  Abdominal:      Palpations: Abdomen is soft  Tenderness: There is no abdominal tenderness  Musculoskeletal:      Right lower leg: No edema  Left lower leg: No edema     Lymphadenopathy:      " Cervical: No cervical adenopathy  Skin:     Coloration: Skin is not pale  Neurological:      General: No focal deficit present  Mental Status: She is alert  Motor: No weakness        Gait: Gait normal    Psychiatric:         Mood and Affect: Mood normal                 TARAS Howard

## 2023-06-08 ENCOUNTER — TELEPHONE (OUTPATIENT)
Dept: ADMINISTRATIVE | Facility: OTHER | Age: 58
End: 2023-06-08

## 2023-06-08 NOTE — TELEPHONE ENCOUNTER
----- Message from Asia Cid sent at 6/7/2023 10:26 AM EDT -----  06/07/23 10:27 AM    Hello, our patient Marciano Campbell has had Mammogram completed/performed  Please assist in updating the patient chart by pulling the document from the Imaging/Procedure Tab  The date of service is 3/2023       Thank you,  TARAS Cid  [facility] [id number] [place] [address]

## 2023-06-08 NOTE — TELEPHONE ENCOUNTER
----- Message from Asia Monroe sent at 6/7/2023 10:43 AM EDT -----  06/07/23 10:43 AM    Hello, our patient Vandana Singh has had Pap Smear (HPV) aka Cervical Cancer Screening completed/performed  Please assist in updating the patient chart by pulling the document from lab Tab within Chart Review  The date of service is 2021       Thank you,  TARAS Monroe  [facility] [address] [place] [address]

## 2023-06-09 NOTE — TELEPHONE ENCOUNTER
Upon review of the In Basket request we were able to locate, review, and update the patient chart as requested for Mammogram and Pap Smear (HPV) aka Cervical Cancer Screening  Any additional questions or concerns should be emailed to the Practice Liaisons via the appropriate education email address, please do not reply via In Basket      Thank you  Emelda Schirmer, MA

## 2023-09-25 ENCOUNTER — OFFICE VISIT (OUTPATIENT)
Dept: FAMILY MEDICINE CLINIC | Facility: CLINIC | Age: 58
End: 2023-09-25
Payer: COMMERCIAL

## 2023-09-25 VITALS
SYSTOLIC BLOOD PRESSURE: 132 MMHG | RESPIRATION RATE: 17 BRPM | HEIGHT: 65 IN | OXYGEN SATURATION: 97 % | TEMPERATURE: 97.5 F | DIASTOLIC BLOOD PRESSURE: 78 MMHG | WEIGHT: 162 LBS | BODY MASS INDEX: 26.99 KG/M2 | HEART RATE: 89 BPM

## 2023-09-25 DIAGNOSIS — Z87.19 HISTORY OF DIVERTICULITIS: ICD-10-CM

## 2023-09-25 DIAGNOSIS — R10.30 LOWER ABDOMINAL PAIN: Primary | ICD-10-CM

## 2023-09-25 PROCEDURE — 99213 OFFICE O/P EST LOW 20 MIN: CPT | Performed by: NURSE PRACTITIONER

## 2023-09-25 RX ORDER — GATIFLOXACIN 5 MG/ML
SOLUTION/ DROPS OPHTHALMIC
Status: ON HOLD | COMMUNITY
Start: 2023-09-13 | End: 2023-10-02 | Stop reason: SDUPTHER

## 2023-09-25 RX ORDER — PREDNISOLONE ACETATE 10 MG/ML
SUSPENSION/ DROPS OPHTHALMIC
COMMUNITY
Start: 2023-09-05 | End: 2023-10-02 | Stop reason: HOSPADM

## 2023-09-25 RX ORDER — AMOXICILLIN AND CLAVULANATE POTASSIUM 875; 125 MG/1; MG/1
1 TABLET, FILM COATED ORAL EVERY 12 HOURS SCHEDULED
Qty: 14 TABLET | Refills: 0 | Status: ON HOLD | OUTPATIENT
Start: 2023-09-25 | End: 2023-10-02

## 2023-09-25 RX ORDER — KETOROLAC TROMETHAMINE 5 MG/ML
SOLUTION OPHTHALMIC
Status: ON HOLD | COMMUNITY
Start: 2023-09-05 | End: 2023-10-02 | Stop reason: SDUPTHER

## 2023-09-25 NOTE — PROGRESS NOTES
Assessment/Plan   Abdominal pain  Exam not consistent with acute diverticulitis at present however recommend watchful waiting, pt to shift to diverticulitis diet for 2-3 days to abort possible threat  Pt to message me with update in 3 days     The case discussed with patient using patient centered shared decision making. The patient was counseled regarding instructions for management,-- risk factor reductions,-- prognosis,-- impressions,-- risks and benefits of treatment options,-- importance of compliance with treatment. I have reviewed the instructions with the patient, answering all questions to her satisfaction. Cesar Moreau is a 59 y.o. female who presents for evaluation of abdominal pain. Onset was 4 days ago. Symptoms have been gradually improving. The pain is described as cramping. Pain is located in the lower abdomen without radiation. Aggravating factors: none. Alleviating factors: none. Associated symptoms: none. The patient denies belching, chills, constipation, diarrhea, dysuria, fever, flatus, frequency, headache, melena, myalgias, nausea, sweats and vomiting. She had episode of severe LLQ abd pain in 5/2023. She was evaluated at [address] [place] [address] ED. She was diagnosed with acute diverticulitis. Current symptom set different. She denies recent travel. Denies sick contacts. Denies consumption of contaminated foods. The following portions of the patient's history were reviewed and updated as appropriate: allergies, current medications, past family history, past medical history, past social history, past surgical history and problem list.    Review of Systems  Pertinent items are noted in HPI.      Objective    Vitals:    09/25/23 1446   BP: 132/78   BP Location: Left arm   Patient Position: Sitting   Cuff Size: Standard   Pulse: 89   Resp: 17   Temp: 97.5 °F (36.4 °C)   TempSrc: Temporal   SpO2: 97%   Weight: 73.5 kg (162 lb)   Height: 5' 4.5" (1.638 m)     Physical Exam  Vitals and nursing note reviewed. Constitutional:       General: She is not in acute distress. Appearance: Normal appearance. She is not ill-appearing. Cardiovascular:      Rate and Rhythm: Normal rate and regular rhythm. Pulses: Normal pulses. Heart sounds: Normal heart sounds. Pulmonary:      Effort: Pulmonary effort is normal.      Breath sounds: Normal breath sounds. Abdominal:      General: Bowel sounds are normal. There is no distension. Palpations: Abdomen is soft. There is no mass. Tenderness: There is no right CVA tenderness, left CVA tenderness, guarding or rebound. Lymphadenopathy:      Cervical: No cervical adenopathy. Skin:     General: Skin is warm and dry. Coloration: Skin is not pale. Neurological:      General: No focal deficit present. Mental Status: She is alert. Mental status is at baseline.    Psychiatric:         Mood and Affect: Mood normal.         Behavior: Behavior normal.     \

## 2023-09-26 RX ORDER — ACETAMINOPHEN 500 MG
500 TABLET ORAL EVERY 6 HOURS PRN
COMMUNITY

## 2023-09-26 NOTE — PRE-PROCEDURE INSTRUCTIONS
Pre-Surgery Instructions:   Medication Instructions   • acetaminophen (TYLENOL) 500 mg tablet Uses PRN- OK to take day of surgery   • omeprazole (PriLOSEC) 20 mg delayed release capsule Uses PRN- OK to take day of surgery   • rosuvastatin (CRESTOR) 20 MG tablet Take night before surgery    Medication instructions for day surgery reviewed. Please use only a sip of water to take your instructed medications. Avoid all over the counter vitamins, supplements and NSAIDS for one week prior to surgery per anesthesia guidelines. Tylenol is ok to take as needed. You will receive a call one business day prior to surgery with an arrival time and hospital directions. If your surgery is scheduled on a Monday, the hospital will be calling you on the Friday prior to your surgery. If you have not heard from anyone by 8pm, please call the hospital supervisor through the hospital  at [phone]. Megan Maguire [phone]). Do not eat or drink anything after midnight the night before your surgery, including candy, mints, lifesavers, or chewing gum. Do not drink alcohol 24hrs before your surgery. Try not to smoke at least 24hrs before your surgery. Follow the pre surgery showering instructions as listed in the [facility] Surgical Experience Booklet” or otherwise provided by your surgeon's office. Do not shave the surgical area 24 hours before surgery. Do not apply any lotions, creams, including makeup, cologne, deodorant, or perfumes after showering on the day of your surgery. No contact lenses, eye make-up, or artificial eyelashes. Remove nail polish, including gel polish, and any artificial, gel, or acrylic nails if possible. Remove all jewelry including rings and body piercing jewelry. Wear causal clothing that is easy to take on and off. Consider your type of surgery. Keep any valuables, jewelry, piercings at home. Please bring any specially ordered equipment (sling, braces) if indicated.     Arrange for a responsible person to drive you to and from the hospital on the day of your surgery. Visitor Guidelines discussed. Call the surgeon's office with any new illnesses, exposures, or additional questions prior to surgery. Please reference your [facility] Surgical Experience Booklet” for additional information to prepare for your upcoming surgery.

## 2023-10-02 ENCOUNTER — HOSPITAL ENCOUNTER (OUTPATIENT)
Facility: AMBULARY SURGERY CENTER | Age: 58
Setting detail: OUTPATIENT SURGERY
Discharge: HOME | End: 2023-10-02
Attending: OPHTHALMOLOGY | Admitting: OPHTHALMOLOGY
Payer: COMMERCIAL

## 2023-10-02 ENCOUNTER — ANESTHESIA (OUTPATIENT)
Dept: PERIOP | Facility: AMBULARY SURGERY CENTER | Age: 58
End: 2023-10-02
Payer: COMMERCIAL

## 2023-10-02 ENCOUNTER — ANESTHESIA EVENT (OUTPATIENT)
Dept: PERIOP | Facility: AMBULARY SURGERY CENTER | Age: 58
End: 2023-10-02
Payer: COMMERCIAL

## 2023-10-02 VITALS
TEMPERATURE: 97.9 F | SYSTOLIC BLOOD PRESSURE: 114 MMHG | BODY MASS INDEX: 26.66 KG/M2 | WEIGHT: 160 LBS | OXYGEN SATURATION: 97 % | HEART RATE: 67 BPM | HEIGHT: 65 IN | RESPIRATION RATE: 16 BRPM | DIASTOLIC BLOOD PRESSURE: 54 MMHG

## 2023-10-02 DIAGNOSIS — H25.12 AGE-RELATED NUCLEAR CATARACT OF LEFT EYE: Primary | ICD-10-CM

## 2023-10-02 PROCEDURE — V2632 POST CHMBR INTRAOCULAR LENS: HCPCS | Performed by: OPHTHALMOLOGY

## 2023-10-02 DEVICE — STERILE UV AND BLUE LIGHT FILTERING ACRYLIC FOLDABLE ASPHERIC POSTERIOR CHAMBER INTRAOCULAR LENS
Type: IMPLANTABLE DEVICE | Site: EYE | Status: FUNCTIONAL
Brand: CLAREON

## 2023-10-02 RX ORDER — KETOROLAC TROMETHAMINE 5 MG/ML
1 SOLUTION OPHTHALMIC
Status: ACTIVE | OUTPATIENT
Start: 2023-10-02 | End: 2023-10-02

## 2023-10-02 RX ORDER — MIDAZOLAM HYDROCHLORIDE 2 MG/2ML
INJECTION, SOLUTION INTRAMUSCULAR; INTRAVENOUS AS NEEDED
Status: DISCONTINUED | OUTPATIENT
Start: 2023-10-02 | End: 2023-10-02

## 2023-10-02 RX ORDER — PHENYLEPHRINE HYDROCHLORIDE 25 MG/ML
1 SOLUTION/ DROPS OPHTHALMIC
Status: ACTIVE | OUTPATIENT
Start: 2023-10-02 | End: 2023-10-02

## 2023-10-02 RX ORDER — LIDOCAINE HYDROCHLORIDE 20 MG/ML
1 JELLY TOPICAL
Status: COMPLETED | OUTPATIENT
Start: 2023-10-02 | End: 2023-10-02

## 2023-10-02 RX ORDER — GATIFLOXACIN 5 MG/ML
1 SOLUTION/ DROPS OPHTHALMIC 2 TIMES DAILY
Refills: 0
Start: 2023-10-02

## 2023-10-02 RX ORDER — SODIUM CHLORIDE, SODIUM LACTATE, POTASSIUM CHLORIDE, CALCIUM CHLORIDE 600; 310; 30; 20 MG/100ML; MG/100ML; MG/100ML; MG/100ML
125 INJECTION, SOLUTION INTRAVENOUS CONTINUOUS
Status: DISCONTINUED | OUTPATIENT
Start: 2023-10-02 | End: 2023-10-02 | Stop reason: HOSPADM

## 2023-10-02 RX ORDER — TETRACAINE HYDROCHLORIDE 5 MG/ML
SOLUTION OPHTHALMIC AS NEEDED
Status: DISCONTINUED | OUTPATIENT
Start: 2023-10-02 | End: 2023-10-02 | Stop reason: HOSPADM

## 2023-10-02 RX ORDER — BALANCED SALT SOLUTION 6.4; .75; .48; .3; 3.9; 1.7 MG/ML; MG/ML; MG/ML; MG/ML; MG/ML; MG/ML
SOLUTION OPHTHALMIC AS NEEDED
Status: DISCONTINUED | OUTPATIENT
Start: 2023-10-02 | End: 2023-10-02 | Stop reason: HOSPADM

## 2023-10-02 RX ORDER — GATIFLOXACIN 5 MG/ML
SOLUTION/ DROPS OPHTHALMIC AS NEEDED
Status: DISCONTINUED | OUTPATIENT
Start: 2023-10-02 | End: 2023-10-02 | Stop reason: HOSPADM

## 2023-10-02 RX ORDER — CYCLOPENTOLATE HYDROCHLORIDE 10 MG/ML
1 SOLUTION/ DROPS OPHTHALMIC
Status: ACTIVE | OUTPATIENT
Start: 2023-10-02 | End: 2023-10-02

## 2023-10-02 RX ORDER — TETRACAINE HYDROCHLORIDE 5 MG/ML
1 SOLUTION OPHTHALMIC ONCE
Status: COMPLETED | OUTPATIENT
Start: 2023-10-02 | End: 2023-10-02

## 2023-10-02 RX ORDER — KETOROLAC TROMETHAMINE 5 MG/ML
1 SOLUTION OPHTHALMIC 4 TIMES DAILY
Qty: 5 ML | Refills: 0
Start: 2023-10-02

## 2023-10-02 RX ADMIN — PHENYLEPHRINE HYDROCHLORIDE 1 DROP: 25 SOLUTION/ DROPS OPHTHALMIC at 10:15

## 2023-10-02 RX ADMIN — MIDAZOLAM 2 MG: 1 INJECTION INTRAMUSCULAR; INTRAVENOUS at 10:48

## 2023-10-02 RX ADMIN — CYCLOPENTOLATE HYDROCHLORIDE 1 DROP: 10 SOLUTION OPHTHALMIC at 10:29

## 2023-10-02 RX ADMIN — PHENYLEPHRINE HYDROCHLORIDE 1 DROP: 25 SOLUTION/ DROPS OPHTHALMIC at 09:59

## 2023-10-02 RX ADMIN — KETOROLAC TROMETHAMINE 1 DROP: 5 SOLUTION/ DROPS OPHTHALMIC at 10:29

## 2023-10-02 RX ADMIN — KETOROLAC TROMETHAMINE 1 DROP: 5 SOLUTION/ DROPS OPHTHALMIC at 10:15

## 2023-10-02 RX ADMIN — TETRACAINE HYDROCHLORIDE 1 DROP: 5 SOLUTION OPHTHALMIC at 09:59

## 2023-10-02 RX ADMIN — CYCLOPENTOLATE HYDROCHLORIDE 1 DROP: 10 SOLUTION OPHTHALMIC at 09:59

## 2023-10-02 RX ADMIN — CYCLOPENTOLATE HYDROCHLORIDE 1 DROP: 10 SOLUTION OPHTHALMIC at 10:15

## 2023-10-02 RX ADMIN — LIDOCAINE HYDROCHLORIDE 1 APPLICATION: 20 JELLY TOPICAL at 09:59

## 2023-10-02 RX ADMIN — LIDOCAINE HYDROCHLORIDE 1 APPLICATION: 20 JELLY TOPICAL at 10:29

## 2023-10-02 RX ADMIN — KETOROLAC TROMETHAMINE 1 DROP: 5 SOLUTION/ DROPS OPHTHALMIC at 09:59

## 2023-10-02 RX ADMIN — LIDOCAINE HYDROCHLORIDE 1 APPLICATION: 20 JELLY TOPICAL at 10:15

## 2023-10-02 RX ADMIN — PHENYLEPHRINE HYDROCHLORIDE 1 DROP: 25 SOLUTION/ DROPS OPHTHALMIC at 10:29

## 2023-10-02 NOTE — ANESTHESIA PREPROCEDURE EVALUATION
Procedure:  EXTRACTION EXTRACAPSULAR CATARACT PHACO INTRAOCULAR LENS (IOL) (Left: Eye)    Relevant Problems   CARDIO   (+) Hyperlipidemia      Other   (+) History of breast cancer      GERD  Physical Exam    Airway    Mallampati score: II  TM Distance: >3 FB  Neck ROM: full     Dental   Comment: Denies loose teeth,     Cardiovascular  Cardiovascular exam normal    Pulmonary  Pulmonary exam normal     Other Findings  Portions of exam deferred due to low yield and/or unknown COVID status      Anesthesia Plan  ASA Score- 2     Anesthesia Type- IV sedation with anesthesia with ASA Monitors. Additional Monitors:   Airway Plan:           Plan Factors-Exercise tolerance (METS): >4 METS. Chart reviewed. Existing labs reviewed. Patient summary reviewed. Patient is not a current smoker. Induction- intravenous. Postoperative Plan-     Informed Consent- Anesthetic plan and risks discussed with patient. I personally reviewed this patient with the CRNA. Discussed and agreed on the Anesthesia Plan with the CRNA. Cynthia Mcdonnell

## 2023-10-02 NOTE — DISCHARGE INSTR - AVS FIRST PAGE
Dr. Emigdio Franklin Cataract Instructions    Activity:     1. No Driving until instructed   2. Keep shield on until seen tomorrow except when administering drops   3. No heavy lifting   4. No water in eye     Diet:     1. Resume normal diet    Normal Symptoms:     1. Mild Headache   2. Scratchy or picky feeling around eye    Call the office if:     1. You have any questions or concerns   2. If eye pain is not relieved by extra strength tylenol    Office phone number:  [phone]      Next appointment:     1. See Dr Emigdio Franklin at his office tomorrow as scheduled   __________________________________________________________   2. Bring blue eye kit with you and eyedrops to the office    A new set of comprehensive instructions will be given and reviewed with you during your office visit tomorrow.

## 2023-10-02 NOTE — ANESTHESIA POSTPROCEDURE EVALUATION
Post-Op Assessment Note    CV Status:  Stable  Pain Score: 0    Pain management: adequate     Mental Status:  Alert   Hydration Status:  Stable   PONV Controlled:  None   Airway Patency:  Patent   Two or more mitigation strategies used for obstructive sleep apnea   Post Op Vitals Reviewed: Yes      Staff: CRNA         No notable events documented.     BP   114/57   Temp 97   Pulse 72   Resp 16   SpO2 100

## 2023-10-02 NOTE — OP NOTE
OPERATIVE REPORT    PATIENT NAME: Zulema Clark    :  1965  MRN: [id number]  Pt Location: [facility] OR ROOM 01    Surgery Date: 10/2/2023    Surgeon(s) and Role:     * Steven Chandler MD - Primary    Age-related nuclear cataract, left eye [H25.12]    Post-Op Diagnosis Codes:     * Age-related nuclear cataract, left eye [H25.12]    Procedure(s):  EXTRACTION EXTRACAPSULAR CATARACT PHACO INTRAOCULAR LENS (IOL)    Anesthesia Type:   IV Sedation with Anesthesia    Operative Indications:  Age-related nuclear cataract, left eye [H25.12]  Decreased vision to 20/40. With problems driving. Pt requested cataract sx the left eye    Procedure and Technique:    Procedure Details     The patient was brought in the OR in stable condition and placed on the operative table. The left eye was prepped and draped in the usual sterile manner. Attention was directed to the left eye where a lid speculum was placed. A 2.75 mm clear corneal incision was made temperally. 1 cc of Shugarcaine was irrigated into the anterior chamber followed by viscoat. The side port incision was placed superiorly. There was an area of posterior synechiae that I broke with an iris spatula. The pupil remained small and I did not feel it was safe to proceed. Therefore, a Beehler dilator was used to dilate the pupil further. The pupil stayed dilated with a small amount of micro hemorrhage at the margin. More viscoat was used and the pupil was dilated nicely and the procedure could be continued safely. The capsularrhexis was made and the nucleus was hydrodissected with BSS. The nucleus was then removed with the phaco handpiece followed by removal of the cortical material with the I/A handpiece. As she has a small eye I used extra viscoat during the phaco to assist with chamber depth and protect the endothelium. The capsular bag was then filled with Provisc. The IOL was folded and placed in to the capsular bag and centered well.  The remaining Provisc was removed from the eye with the I/A. The wounds were hydrated with BSS and there was leakage at the temporal wound. I closed it with a single 10-0 nylon stitch, burying the knot. The wound was rechecked and found to be water tight. The lid speculum was removed and 2 drops of Gatifloxicin were placed over the cornea. A protective eye shield was taped over the eye and the patient went to PACU in stable condition. I will see the patient in the office tomorrow and the expected post op period is a few weeks.        Complications: None        Disposition: PACU   Condition: Stable    SIGNATURE: Milka Elizabeth MD  DATE: October 2, 2023  TIME: 11:16 AM

## 2023-10-23 RX ORDER — METHOCARBAMOL 750 MG/1
750 TABLET, FILM COATED ORAL 4 TIMES DAILY PRN
COMMUNITY
Start: 2023-10-21 | End: 2023-10-31

## 2023-10-23 RX ORDER — IBUPROFEN 600 MG/1
600 TABLET ORAL EVERY 6 HOURS PRN
COMMUNITY
Start: 2023-10-21 | End: 2024-10-20

## 2023-10-23 RX ORDER — PREDNISOLONE ACETATE 10 MG/ML
1 SUSPENSION/ DROPS OPHTHALMIC 2 TIMES DAILY
COMMUNITY
End: 2023-10-30 | Stop reason: HOSPADM

## 2023-10-23 NOTE — PRE-PROCEDURE INSTRUCTIONS
Pre-Surgery Instructions:   Medication Instructions    ketorolac (ACULAR) 0.5 % ophthalmic solution Instructions provided by MD    methocarbamol (ROBAXIN) 750 mg tablet Uses PRN- OK to take day of surgery    omeprazole (PriLOSEC) 20 mg delayed release capsule Uses PRN- OK to take day of surgery    prednisoLONE acetate (prednisoLONE Acetate P-F) 1 % ophthalmic suspension Instructions provided by MD    rosuvastatin (CRESTOR) 20 MG tablet Take night before surgery    Medication instructions for day surgery reviewed. Please use only a sip of water to take your instructed medications. Avoid all over the counter vitamins, supplements and NSAIDS for one week prior to surgery per anesthesia guidelines. Tylenol is ok to take as needed. You will receive a call one business day prior to surgery with an arrival time and hospital directions. If your surgery is scheduled on a Monday, the hospital will be calling you on the Friday prior to your surgery. If you have not heard from anyone by 8pm, please call the hospital supervisor through the hospital  at [phone]. Shilpa Oreilly [phone]). Do not eat or drink anything after midnight the night before your surgery, including candy, mints, lifesavers, or chewing gum. Do not drink alcohol 24hrs before your surgery. Try not to smoke at least 24hrs before your surgery. Follow the pre surgery showering instructions as listed in the [facility] Surgical Experience Booklet” or otherwise provided by your surgeon's office. Do not shave the surgical area 24 hours before surgery. Do not apply any lotions, creams, including makeup, cologne, deodorant, or perfumes after showering on the day of your surgery. No contact lenses, eye make-up, or artificial eyelashes. Remove nail polish, including gel polish, and any artificial, gel, or acrylic nails if possible. Remove all jewelry including rings and body piercing jewelry. Wear causal clothing that is easy to take on and off. Consider your type of surgery. Keep any valuables, jewelry, piercings at home. Please bring any specially ordered equipment (sling, braces) if indicated. Arrange for a responsible person to drive you to and from the hospital on the day of your surgery. Visitor Guidelines discussed. Call the surgeon's office with any new illnesses, exposures, or additional questions prior to surgery. Please reference your [facility] Surgical Experience Booklet” for additional information to prepare for your upcoming surgery.

## 2023-10-30 ENCOUNTER — ANESTHESIA EVENT (OUTPATIENT)
Dept: PERIOP | Facility: AMBULARY SURGERY CENTER | Age: 58
End: 2023-10-30
Payer: COMMERCIAL

## 2023-10-30 ENCOUNTER — ANESTHESIA (OUTPATIENT)
Dept: PERIOP | Facility: AMBULARY SURGERY CENTER | Age: 58
End: 2023-10-30
Payer: COMMERCIAL

## 2023-10-30 ENCOUNTER — HOSPITAL ENCOUNTER (OUTPATIENT)
Facility: AMBULARY SURGERY CENTER | Age: 58
Setting detail: OUTPATIENT SURGERY
Discharge: HOME | End: 2023-10-30
Attending: OPHTHALMOLOGY | Admitting: OPHTHALMOLOGY
Payer: COMMERCIAL

## 2023-10-30 VITALS
OXYGEN SATURATION: 99 % | RESPIRATION RATE: 16 BRPM | TEMPERATURE: 98 F | HEIGHT: 65 IN | BODY MASS INDEX: 26.99 KG/M2 | HEART RATE: 67 BPM | DIASTOLIC BLOOD PRESSURE: 63 MMHG | SYSTOLIC BLOOD PRESSURE: 110 MMHG | WEIGHT: 162 LBS

## 2023-10-30 DIAGNOSIS — H25.12 AGE-RELATED NUCLEAR CATARACT OF LEFT EYE: ICD-10-CM

## 2023-10-30 PROCEDURE — V2632 POST CHMBR INTRAOCULAR LENS: HCPCS | Performed by: OPHTHALMOLOGY

## 2023-10-30 DEVICE — STERILE UV AND BLUE LIGHT FILTERING ACRYLIC FOLDABLE ASPHERIC POSTERIOR CHAMBER INTRAOCULAR LENS
Type: IMPLANTABLE DEVICE | Site: EYE | Status: FUNCTIONAL
Brand: CLAREON

## 2023-10-30 RX ORDER — PHENYLEPHRINE HYDROCHLORIDE 25 MG/ML
1 SOLUTION/ DROPS OPHTHALMIC
Status: COMPLETED | OUTPATIENT
Start: 2023-10-30 | End: 2023-10-30

## 2023-10-30 RX ORDER — TETRACAINE HYDROCHLORIDE 5 MG/ML
1 SOLUTION OPHTHALMIC ONCE
Status: COMPLETED | OUTPATIENT
Start: 2023-10-30 | End: 2023-10-30

## 2023-10-30 RX ORDER — GATIFLOXACIN 5 MG/ML
SOLUTION/ DROPS OPHTHALMIC AS NEEDED
Status: DISCONTINUED | OUTPATIENT
Start: 2023-10-30 | End: 2023-10-30 | Stop reason: HOSPADM

## 2023-10-30 RX ORDER — CYCLOPENTOLATE HYDROCHLORIDE 10 MG/ML
1 SOLUTION/ DROPS OPHTHALMIC
Status: COMPLETED | OUTPATIENT
Start: 2023-10-30 | End: 2023-10-30

## 2023-10-30 RX ORDER — BALANCED SALT SOLUTION 6.4; .75; .48; .3; 3.9; 1.7 MG/ML; MG/ML; MG/ML; MG/ML; MG/ML; MG/ML
SOLUTION OPHTHALMIC AS NEEDED
Status: DISCONTINUED | OUTPATIENT
Start: 2023-10-30 | End: 2023-10-30 | Stop reason: HOSPADM

## 2023-10-30 RX ORDER — TETRACAINE HYDROCHLORIDE 5 MG/ML
SOLUTION OPHTHALMIC AS NEEDED
Status: DISCONTINUED | OUTPATIENT
Start: 2023-10-30 | End: 2023-10-30 | Stop reason: HOSPADM

## 2023-10-30 RX ORDER — KETOROLAC TROMETHAMINE 5 MG/ML
1 SOLUTION OPHTHALMIC 4 TIMES DAILY
Qty: 5 ML | Refills: 0
Start: 2023-10-30

## 2023-10-30 RX ORDER — LIDOCAINE HYDROCHLORIDE 20 MG/ML
1 JELLY TOPICAL
Status: COMPLETED | OUTPATIENT
Start: 2023-10-30 | End: 2023-10-30

## 2023-10-30 RX ORDER — GATIFLOXACIN 5 MG/ML
1 SOLUTION/ DROPS OPHTHALMIC 2 TIMES DAILY
Refills: 0
Start: 2023-10-30

## 2023-10-30 RX ORDER — LIDOCAINE HYDROCHLORIDE 10 MG/ML
INJECTION, SOLUTION EPIDURAL; INFILTRATION; INTRACAUDAL; PERINEURAL AS NEEDED
Status: DISCONTINUED | OUTPATIENT
Start: 2023-10-30 | End: 2023-10-30 | Stop reason: HOSPADM

## 2023-10-30 RX ORDER — MIDAZOLAM HYDROCHLORIDE 2 MG/2ML
INJECTION, SOLUTION INTRAMUSCULAR; INTRAVENOUS AS NEEDED
Status: DISCONTINUED | OUTPATIENT
Start: 2023-10-30 | End: 2023-10-30

## 2023-10-30 RX ORDER — KETOROLAC TROMETHAMINE 5 MG/ML
1 SOLUTION OPHTHALMIC
Status: COMPLETED | OUTPATIENT
Start: 2023-10-30 | End: 2023-10-30

## 2023-10-30 RX ADMIN — PHENYLEPHRINE HYDROCHLORIDE 1 DROP: 25 SOLUTION/ DROPS OPHTHALMIC at 09:30

## 2023-10-30 RX ADMIN — KETOROLAC TROMETHAMINE 1 DROP: 5 SOLUTION OPHTHALMIC at 09:45

## 2023-10-30 RX ADMIN — PHENYLEPHRINE HYDROCHLORIDE 1 DROP: 25 SOLUTION/ DROPS OPHTHALMIC at 10:00

## 2023-10-30 RX ADMIN — KETOROLAC TROMETHAMINE 1 DROP: 5 SOLUTION OPHTHALMIC at 09:30

## 2023-10-30 RX ADMIN — LIDOCAINE HYDROCHLORIDE 1 APPLICATION: 20 JELLY TOPICAL at 09:45

## 2023-10-30 RX ADMIN — CYCLOPENTOLATE HYDROCHLORIDE 1 DROP: 10 SOLUTION/ DROPS OPHTHALMIC at 09:15

## 2023-10-30 RX ADMIN — KETOROLAC TROMETHAMINE 1 DROP: 5 SOLUTION OPHTHALMIC at 09:15

## 2023-10-30 RX ADMIN — CYCLOPENTOLATE HYDROCHLORIDE 1 DROP: 10 SOLUTION/ DROPS OPHTHALMIC at 09:45

## 2023-10-30 RX ADMIN — CYCLOPENTOLATE HYDROCHLORIDE 1 DROP: 10 SOLUTION/ DROPS OPHTHALMIC at 09:30

## 2023-10-30 RX ADMIN — MIDAZOLAM 2 MG: 1 INJECTION INTRAMUSCULAR; INTRAVENOUS at 10:08

## 2023-10-30 RX ADMIN — CYCLOPENTOLATE HYDROCHLORIDE 1 DROP: 10 SOLUTION/ DROPS OPHTHALMIC at 10:00

## 2023-10-30 RX ADMIN — TETRACAINE HYDROCHLORIDE 1 DROP: 5 SOLUTION OPHTHALMIC at 09:15

## 2023-10-30 RX ADMIN — LIDOCAINE HYDROCHLORIDE 1 APPLICATION: 20 JELLY TOPICAL at 09:15

## 2023-10-30 RX ADMIN — KETOROLAC TROMETHAMINE 1 DROP: 5 SOLUTION OPHTHALMIC at 10:00

## 2023-10-30 RX ADMIN — PHENYLEPHRINE HYDROCHLORIDE 1 DROP: 25 SOLUTION/ DROPS OPHTHALMIC at 09:45

## 2023-10-30 RX ADMIN — LIDOCAINE HYDROCHLORIDE 1 APPLICATION: 20 JELLY TOPICAL at 09:30

## 2023-10-30 RX ADMIN — PHENYLEPHRINE HYDROCHLORIDE 1 DROP: 25 SOLUTION/ DROPS OPHTHALMIC at 09:15

## 2023-10-30 NOTE — DISCHARGE INSTR - AVS FIRST PAGE
Dr. Ryan Lee Cataract Instructions    Activity:     1. No Driving until instructed   2. Keep shield on until seen tomorrow except when administering drops   3. No heavy lifting   4. No water in eye     Diet:     1. Resume normal diet    Normal Symptoms:     1. Mild Headache   2. Scratchy or picky feeling around eye    Call the office if:     1. You have any questions or concerns   2. If eye pain is not relieved by extra strength tylenol    Office phone number:  [phone]      Next appointment:     1. See Dr Ryan Lee at his office tomorrow as scheduled   __________________________________________________________   2. Bring blue eye kit with you and eyedrops to the office    A new set of comprehensive instructions will be given and reviewed with you during your office visit tomorrow.

## 2023-10-30 NOTE — ANESTHESIA POSTPROCEDURE EVALUATION
Post-Op Assessment Note    CV Status:  Stable  Pain Score: 0    Pain management: adequate     Mental Status:  Awake   Hydration Status:  Stable   PONV Controlled:  None   Airway Patency:  Patent      Post Op Vitals Reviewed: Yes      Staff: CRNA   Comments: spontaneously breathing, awake & conversing, endorsed no operating heavy machinery or making legal decisions for 24 hours, ptverbalizedunderstanding, pt will ask for asistance to ambulate of walk postop. No notable events documented.     BP   126/65   Temp      Pulse  72   Resp   14   SpO2   99

## 2023-10-30 NOTE — ANESTHESIA PREPROCEDURE EVALUATION
Procedure:  EXTRACTION EXTRACAPSULAR CATARACT PHACO INTRAOCULAR LENS (IOL) (Right: Eye)    Relevant Problems   ANESTHESIA (within normal limits)      CARDIO   (+) Hyperlipidemia      ENDO (within normal limits)      PULMONARY (within normal limits)      GERD  Physical Exam    Airway    Mallampati score: II  TM Distance: >3 FB  Neck ROM: full     Dental   Comment: Denies loose teeth     Cardiovascular  Cardiovascular exam normal    Pulmonary  Pulmonary exam normal     Other Findings  Portions of exam deferred due to low yield and/or unknown COVID status      Anesthesia Plan  ASA Score- 2     Anesthesia Type- IV sedation with anesthesia with ASA Monitors. Additional Monitors:     Airway Plan:            Plan Factors-Exercise tolerance (METS): >4 METS. Chart reviewed. Existing labs reviewed. Patient summary reviewed. Patient is not a current smoker. Induction- intravenous. Postoperative Plan-     Informed Consent- Anesthetic plan and risks discussed with patient. I personally reviewed this patient with the CRNA. Discussed and agreed on the Anesthesia Plan with the CRNA. Dillan Campos

## 2023-10-30 NOTE — OP NOTE
OPERATIVE REPORT    PATIENT NAME: Sary Allen    :  1965  MRN: [id number]  Pt Location: [facility] OR ROOM 01    Surgery Date: 10/30/2023    Surgeon(s) and Role:     * Josse Millan MD - Primary    Age-related nuclear cataract, right eye [H25.11]    Post-Op Diagnosis Codes:     * Age-related nuclear cataract, right eye [H25.11]    Procedure(s):  EXTRACTION EXTRACAPSULAR CATARACT PHACO INTRAOCULAR LENS (IOL)    Anesthesia Type:   IV Sedation with Anesthesia    Operative Indications:  Age-related nuclear cataract, right eye [H25.11]  Decreased vision to 20/40. With problems driving. Pt requested cataract sx the right eye    Procedure and Technique:    Procedure Details     The patient was brought in the OR in stable condition and placed on the operative table. The right eye was prepped and draped in the usual sterile manner. Attention was directed to the right eye where a lid speculum was placed. A 2.4 mm clear corneal incision was made temperally. 1/2 cc of 1% MPF Lidocaine was irrigated into the anterior chamber followed by viscoat. The side port incision was placed superiorly. The capsularrhexis was made and the nucleus was hydrodissected with BSS. The nucleus was then removed with the phaco handpiece followed by removal of the cortical material with the I/A handpiece. The capsular bag was then filled with Provisc. The IOL was folded and placed in to the capsular bag and centered well. The remaining Provisc was removed from the eye with the I/A. The wounds were hydrated with BSS and found to be water tight. The lid speculum was removed and 2 drops of Gatifloxicin were placed over the cornea. A protective eye shield was taped over the eye and the patient went to PACU in stable condition. I will see the patient in the office tomorrow and the expected post op period is a few weeks.        Complications: None        Disposition: PACU   Condition: Stable    SIGNATURE: Josse Millan MD  DATE:  2023  TIME: 10:32 AM

## 2023-12-04 ENCOUNTER — RA CDI HCC (OUTPATIENT)
Dept: OTHER | Facility: HOSPITAL | Age: 58
End: 2023-12-04

## 2023-12-04 NOTE — PROGRESS NOTES
[address] [facility] coding opportunities       Chart reviewed, no opportunity found: CHART REVIEWED, NO OPPORTUNITY FOUND        Patients Insurance        Commercial Insurance: [address]

## 2023-12-11 ENCOUNTER — OFFICE VISIT (OUTPATIENT)
Dept: FAMILY MEDICINE CLINIC | Facility: CLINIC | Age: 58
End: 2023-12-11
Payer: COMMERCIAL

## 2023-12-11 VITALS
HEIGHT: 65 IN | OXYGEN SATURATION: 98 % | RESPIRATION RATE: 16 BRPM | DIASTOLIC BLOOD PRESSURE: 72 MMHG | SYSTOLIC BLOOD PRESSURE: 118 MMHG | WEIGHT: 163 LBS | HEART RATE: 80 BPM | TEMPERATURE: 98.1 F | BODY MASS INDEX: 27.16 KG/M2

## 2023-12-11 DIAGNOSIS — Z00.00 HEALTH CARE MAINTENANCE: Primary | ICD-10-CM

## 2023-12-11 DIAGNOSIS — Z13.0 SCREENING, DEFICIENCY ANEMIA, IRON: ICD-10-CM

## 2023-12-11 DIAGNOSIS — R73.03 PREDIABETES: ICD-10-CM

## 2023-12-11 DIAGNOSIS — E78.00 PURE HYPERCHOLESTEROLEMIA: ICD-10-CM

## 2023-12-11 DIAGNOSIS — Z13.29 SCREENING FOR THYROID DISORDER: ICD-10-CM

## 2023-12-11 PROCEDURE — 99396 PREV VISIT EST AGE 40-64: CPT | Performed by: NURSE PRACTITIONER

## 2023-12-11 NOTE — PROGRESS NOTES
FAMILY PRACTICE HEALTH MAINTENANCE OFFICE VISIT  [facility]    NAME: Yovanny Bourne  AGE: 59 y.o. SEX: female  : 1965     DATE: 2023    Assessment and Plan     1. Health care maintenance    2. Prediabetes  -     Basic metabolic panel; Future  -     Hemoglobin A1C; Future    3. Pure hypercholesterolemia  -     Lipid panel; Future  -     TSH, 3rd generation; Future    4. Screening, deficiency anemia, iron  -     CBC and differential; Future    5. Screening for thyroid disorder  -     TSH, 3rd generation; Future    Patient clinically stable at this time. Cont with current plan of care. RTO as recommended and PRN    Patient Counseling:   Nutrition: Stressed importance of a well balanced diet, moderation of sodium/saturated fat, caloric balance and sufficient intake of fiber  Exercise: Stressed the importance of regular exercise with a goal of 150 minutes per week  Dental Health: Discussed daily flossing and brushing and regular dental visits   Injury Prevention: Discussed Safety Belts, Safety Helmets, and Smoke Detectors    Immunizations reviewed: Risks and Benefits discussed and Declined recommended vaccinations  Discussed benefits of:  Screening labs. BMI Counseling: Body mass index is 27.12 kg/m². Discussed with patient's BMI with her. The BMI is above normal. Nutrition recommendations include reducing portion sizes, decreasing overall calorie intake, moderation in carbohydrate intake, and increasing intake of lean protein. Exercise recommendations include exercising 3-5 times per week. Return in about 4 months (around 2024).         Chief Complaint     Chief Complaint   Patient presents with    Follow-up     6 month    Annual Exam       History of Present Illness     Here for physical, follow-up    All good in her life  Cataract surgery went well        Well Adult Physical   Patient here for a comprehensive physical exam.      Diet and Physical Activity  Diet: well balanced diet  Exercise: weekly      Depression Screen  PHQ-2/9 Depression Screening              General Health  Hearing: Slighty decreased: bilateral  Vision: no vision problems  Dental: regular dental visits    Reproductive Health  Post-menopausal  and Follows with gynecologist      The following portions of the patient's history were reviewed and updated as appropriate: allergies, current medications, past family history, past medical history, past social history, past surgical history and problem list.    Review of Systems     Review of Systems   Constitutional:  Negative for fatigue, fever and unexpected weight change. HENT:  Negative for trouble swallowing. Respiratory:  Negative for cough and shortness of breath. Cardiovascular:  Negative for chest pain, palpitations and leg swelling. Gastrointestinal:  Negative for abdominal pain and blood in stool. Endocrine: Negative. Genitourinary:  Negative for difficulty urinating and hematuria. Musculoskeletal:  Negative for arthralgias. Skin:  Negative for pallor. Neurological:  Negative for dizziness, tremors, seizures, syncope and weakness. Psychiatric/Behavioral:  Negative for confusion and dysphoric mood. The patient is not nervous/anxious.         Past Medical History     Past Medical History:   Diagnosis Date    Allergic contact dermatitis due to plants, except food 04/26/2021    Allergic rhinitis     Back pain     "bilat rib cage discomfort -hx of hairline T 8 fracture"    BMI 25.0-25.9,adult 11/23/2020    Cancer ([address] [facility]) 2008    Claustrophobia     "anything too close to face"    Ear problems     Environmental allergies     Fatty liver     Fracture of T8 vertebra (HCC)     hairline fracture    Gastric polyps     Gastritis     GERD (gastroesophageal reflux disease)     last egd 2/2016    History of breast cancer 2008    right breast was on tamoxifen and was done in 3/2014    History of cancer chemotherapy 2009    History of mammogram 2018    History of radiation therapy     HL (hearing loss)     Hyperlipidemia     Immunization deficiency 2020    Impaired fasting glucose 2020    Limb alert care status     No BP/IV Right Arm    Neck muscle spasm     to [facility] ED 10/21/23 --to see Dr Monteiro Code 10/25/23 and will let him know    Prediabetes 2020    Tinnitus     Upper abdominal pain     saw her doctor 23 for "abdominal cramping"-told related probably  to irritation-none noted today per pt-instructed to call doctor if s/s would return    Vitamin D deficiency 2020    15    Wears glasses        Past Surgical History     Past Surgical History:   Procedure Laterality Date    BREAST LUMPECTOMY Right 2008    COLONOSCOPY  2014    COLONOSCOPY      CT XCAPSL CTRC RMVL INSJ IO LENS PROSTH W/O ECP Left 10/2/2023    Procedure: EXTRACTION EXTRACAPSULAR CATARACT PHACO INTRAOCULAR LENS (IOL); Surgeon: Frankie Woodard MD;  Location: [facility] MAIN OR;  Service: Ophthalmology    CT XCAPSL CTRC RMVL INSJ IO LENS PROSTH W/O ECP Right 10/30/2023    Procedure: EXTRACTION EXTRACAPSULAR CATARACT PHACO INTRAOCULAR LENS (IOL);   Surgeon: Frankie Woodard MD;  Location: [facility] MAIN OR;  Service: Ophthalmology    UPPER GASTROINTESTINAL ENDOSCOPY         Social History     Social History     Socioeconomic History    Marital status: /Civil Union     Spouse name: None    Number of children: None    Years of education: None    Highest education level: None   Occupational History    None   Tobacco Use    Smoking status: Never    Smokeless tobacco: Never   Vaping Use    Vaping Use: Never used   Substance and Sexual Activity    Alcohol use: Yes     Comment: ocassional -special occasion only 1/year    Drug use: Never    Sexual activity: None     Comment: defer   Other Topics Concern    None   Social History Narrative    Most recent tobacco use screenin2019      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 is due now.       Social Determinants of Health     Financial Resource Strain: Not on file   Food Insecurity: Not on file   Transportation Needs: Not on file   Physical Activity: Not on file   Stress: Not on file   Social Connections: Not on file   Intimate Partner Violence: Not on file   Housing Stability: Not on file       Family History     Family History   Problem Relation Age of Onset    Hypertension Mother     Heart disease Mother     Hyperlipidemia Mother     Skin cancer Father         basal call carcinoma     Hyperlipidemia Father     Liver disease Sister     Heart disease Brother     Breast cancer Maternal Aunt     Esophageal cancer Maternal Uncle     Esophageal cancer Paternal Aunt        Current Medications       Current Outpatient Medications:     rosuvastatin (CRESTOR) 20 MG tablet, TAKE ONE-HALF TABLET BY  MOUTH DAILY (Patient taking differently: 10 mg daily at bedtime), Disp: 45 tablet, Rfl: 3    acetaminophen (TYLENOL) 500 mg tablet, Take 500 mg by mouth every 6 (six) hours as needed for mild pain (Patient not taking: Reported on 12/11/2023), Disp: , Rfl:     gatifloxacin (ZYMAXID) 0.5 %, Administer 1 drop to the right eye 2 (two) times a day (Patient not taking: Reported on 12/11/2023), Disp: , Rfl: 0    ibuprofen (MOTRIN) 600 mg tablet, Take 600 mg by mouth every 6 (six) hours as needed (Patient not taking: Reported on 11/20/2023), Disp: , Rfl:     ketorolac (ACULAR) 0.5 % ophthalmic solution, Administer 1 drop to the right eye 4 (four) times a day (Patient not taking: Reported on 12/11/2023), Disp: 5 mL, Rfl: 0    methocarbamol (ROBAXIN) 750 mg tablet, Take 750 mg by mouth 4 (four) times a day as needed (Patient not taking: Reported on 12/11/2023), Disp: , Rfl:     Olopatadine-Mometasone (Ryaltris) 665-25 MCG/ACT SUSP, 1 spray into each nostril 2 (two) times a day (Patient not taking: Reported on 9/25/2023), Disp: 29 g, Rfl: 6    omeprazole (PriLOSEC) 20 mg delayed release capsule, Take 1 capsule (20 mg total) by mouth daily (Patient not taking: Reported on 12/11/2023), Disp: 90 capsule, Rfl: 1     Allergies     Allergies   Allergen Reactions    Medical Tape Rash     Sometimes adhesive       Objective     /72   Pulse 80   Temp 98.1 °F (36.7 °C) (Temporal)   Resp 16   Ht 5' 5" (1.651 m)   Wt 73.9 kg (163 lb)   LMP  (LMP Unknown) Comment: LMP 2009  SpO2 98%   BMI 27.12 kg/m²      Physical Exam  Vitals and nursing note reviewed. Constitutional:       General: She is not in acute distress. Appearance: Normal appearance. HENT:      Head: Normocephalic and atraumatic. Right Ear: Tympanic membrane normal.      Left Ear: Tympanic membrane normal.   Eyes:      Pupils: Pupils are equal, round, and reactive to light. Neck:      Vascular: No carotid bruit. Cardiovascular:      Rate and Rhythm: Normal rate and regular rhythm. No extrasystoles are present. Pulses: Normal pulses. Heart sounds: Normal heart sounds. No gallop. No S3 or S4 sounds. Pulmonary:      Effort: Pulmonary effort is normal.      Breath sounds: Normal breath sounds. Abdominal:      General: Bowel sounds are normal.      Palpations: Abdomen is soft. Tenderness: There is no abdominal tenderness. Musculoskeletal:      Right lower leg: No edema. Left lower leg: No edema. Lymphadenopathy:      Cervical: No cervical adenopathy. Skin:     General: Skin is warm and dry. Coloration: Skin is not pale. Neurological:      General: No focal deficit present. Mental Status: She is alert. Mental status is at baseline. Motor: No weakness.       Gait: Gait normal.   Psychiatric:         Mood and Affect: Mood normal.         Behavior: Behavior normal.           No results found.         TARAS Collier  [facility]

## 2023-12-20 DIAGNOSIS — E78.2 MIXED HYPERLIPIDEMIA: ICD-10-CM

## 2023-12-20 RX ORDER — ROSUVASTATIN CALCIUM 20 MG/1
30 TABLET, COATED ORAL DAILY
Qty: 45 TABLET | Refills: 0 | Status: SHIPPED | OUTPATIENT
Start: 2023-12-20

## 2024-01-01 ENCOUNTER — PATIENT MESSAGE (OUTPATIENT)
Dept: FAMILY MEDICINE CLINIC | Facility: CLINIC | Age: 59
End: 2024-01-01

## 2024-01-02 DIAGNOSIS — E78.2 MIXED HYPERLIPIDEMIA: ICD-10-CM

## 2024-01-02 RX ORDER — ROSUVASTATIN CALCIUM 20 MG/1
30 TABLET, COATED ORAL DAILY
Qty: 45 TABLET | Refills: 0 | Status: CANCELLED | OUTPATIENT
Start: 2024-01-02

## 2024-01-02 NOTE — TELEPHONE ENCOUNTER
Please send 90 days since this a Mail order pharmacy . She needs 135 tablets, not 45.     Elyssa,  I received from [facility] thirty 20 mg pills and the instructions said to take one and a half per day. I currently cut in half a 20 mg pill therefore taking 10 mg per day. Not sure what happened here. I have been doing well the past two years taking 10 mg per day. Also, I stopped my scheduled refills from [facility] due to having extra pills because of only taking half a pill per day. Not a big deal but I’m wondering if they contacted your office or if you put that in for me?     Thanks,  Abigail

## 2024-01-03 DIAGNOSIS — E78.2 MIXED HYPERLIPIDEMIA: ICD-10-CM

## 2024-01-04 RX ORDER — ROSUVASTATIN CALCIUM 20 MG/1
30 TABLET, COATED ORAL DAILY
Qty: 135 TABLET | Refills: 0 | Status: SHIPPED | OUTPATIENT
Start: 2024-01-04

## 2024-01-29 ENCOUNTER — APPOINTMENT (OUTPATIENT)
Dept: LAB | Facility: AMBULARY SURGERY CENTER | Age: 59
End: 2024-01-29
Payer: COMMERCIAL

## 2024-01-29 DIAGNOSIS — E78.00 PURE HYPERCHOLESTEROLEMIA: ICD-10-CM

## 2024-01-29 DIAGNOSIS — Z13.29 SCREENING FOR THYROID DISORDER: ICD-10-CM

## 2024-01-29 DIAGNOSIS — R73.03 PREDIABETES: ICD-10-CM

## 2024-01-29 DIAGNOSIS — Z13.0 SCREENING, DEFICIENCY ANEMIA, IRON: ICD-10-CM

## 2024-01-29 LAB
ANION GAP SERPL CALCULATED.3IONS-SCNC: 7 MMOL/L
BASOPHILS # BLD AUTO: 0.03 THOUSANDS/ÂΜL (ref 0–0.1)
BASOPHILS NFR BLD AUTO: 0 % (ref 0–1)
BUN SERPL-MCNC: 10 MG/DL (ref 5–25)
CALCIUM SERPL-MCNC: 9.3 MG/DL (ref 8.4–10.2)
CHLORIDE SERPL-SCNC: 102 MMOL/L (ref 96–108)
CHOLEST SERPL-MCNC: 171 MG/DL
CO2 SERPL-SCNC: 31 MMOL/L (ref 21–32)
CREAT SERPL-MCNC: 0.7 MG/DL (ref 0.6–1.3)
EOSINOPHIL # BLD AUTO: 0.15 THOUSAND/ÂΜL (ref 0–0.61)
EOSINOPHIL NFR BLD AUTO: 2 % (ref 0–6)
ERYTHROCYTE [DISTWIDTH] IN BLOOD BY AUTOMATED COUNT: 12.1 % (ref 11.6–15.1)
EST. AVERAGE GLUCOSE BLD GHB EST-MCNC: 120 MG/DL
GFR SERPL CREATININE-BSD FRML MDRD: 94 ML/MIN/1.73SQ M
GLUCOSE P FAST SERPL-MCNC: 92 MG/DL (ref 65–99)
HBA1C MFR BLD: 5.8 %
HCT VFR BLD AUTO: 41.1 % (ref 34.8–46.1)
HDLC SERPL-MCNC: 59 MG/DL
HGB BLD-MCNC: 13.5 G/DL (ref 11.5–15.4)
IMM GRANULOCYTES # BLD AUTO: 0.01 THOUSAND/UL (ref 0–0.2)
IMM GRANULOCYTES NFR BLD AUTO: 0 % (ref 0–2)
LDLC SERPL CALC-MCNC: 93 MG/DL (ref 0–100)
LYMPHOCYTES # BLD AUTO: 2.13 THOUSANDS/ÂΜL (ref 0.6–4.47)
LYMPHOCYTES NFR BLD AUTO: 32 % (ref 14–44)
MCH RBC QN AUTO: 29.8 PG (ref 26.8–34.3)
MCHC RBC AUTO-ENTMCNC: 32.8 G/DL (ref 31.4–37.4)
MCV RBC AUTO: 91 FL (ref 82–98)
MONOCYTES # BLD AUTO: 0.34 THOUSAND/ÂΜL (ref 0.17–1.22)
MONOCYTES NFR BLD AUTO: 5 % (ref 4–12)
NEUTROPHILS # BLD AUTO: 4.01 THOUSANDS/ÂΜL (ref 1.85–7.62)
NEUTS SEG NFR BLD AUTO: 61 % (ref 43–75)
NONHDLC SERPL-MCNC: 112 MG/DL
NRBC BLD AUTO-RTO: 0 /100 WBCS
PLATELET # BLD AUTO: 189 THOUSANDS/UL (ref 149–390)
PMV BLD AUTO: 11.1 FL (ref 8.9–12.7)
POTASSIUM SERPL-SCNC: 4 MMOL/L (ref 3.5–5.3)
RBC # BLD AUTO: 4.53 MILLION/UL (ref 3.81–5.12)
SODIUM SERPL-SCNC: 140 MMOL/L (ref 135–147)
TRIGL SERPL-MCNC: 97 MG/DL
TSH SERPL DL<=0.05 MIU/L-ACNC: 5.14 UIU/ML (ref 0.45–4.5)
WBC # BLD AUTO: 6.67 THOUSAND/UL (ref 4.31–10.16)

## 2024-01-29 PROCEDURE — 80048 BASIC METABOLIC PNL TOTAL CA: CPT

## 2024-01-29 PROCEDURE — 84443 ASSAY THYROID STIM HORMONE: CPT

## 2024-01-29 PROCEDURE — 85025 COMPLETE CBC W/AUTO DIFF WBC: CPT

## 2024-01-29 PROCEDURE — 80061 LIPID PANEL: CPT

## 2024-01-29 PROCEDURE — 83036 HEMOGLOBIN GLYCOSYLATED A1C: CPT

## 2024-01-29 PROCEDURE — 36415 COLL VENOUS BLD VENIPUNCTURE: CPT

## 2024-05-29 ENCOUNTER — OFFICE VISIT (OUTPATIENT)
Dept: FAMILY MEDICINE CLINIC | Facility: CLINIC | Age: 59
End: 2024-05-29
Payer: COMMERCIAL

## 2024-05-29 VITALS
RESPIRATION RATE: 16 BRPM | HEIGHT: 65 IN | OXYGEN SATURATION: 97 % | HEART RATE: 82 BPM | SYSTOLIC BLOOD PRESSURE: 124 MMHG | DIASTOLIC BLOOD PRESSURE: 66 MMHG | WEIGHT: 162 LBS | TEMPERATURE: 97.7 F | BODY MASS INDEX: 26.99 KG/M2

## 2024-05-29 DIAGNOSIS — R21 RASH AND NONSPECIFIC SKIN ERUPTION: Primary | ICD-10-CM

## 2024-05-29 PROBLEM — M25.512 ACUTE PAIN OF LEFT SHOULDER: Status: RESOLVED | Noted: 2022-12-07 | Resolved: 2024-05-29

## 2024-05-29 PROBLEM — U07.1 COVID: Status: RESOLVED | Noted: 2022-04-29 | Resolved: 2024-05-29

## 2024-05-29 PROBLEM — Z28.39 IMMUNIZATION DEFICIENCY: Status: RESOLVED | Noted: 2020-05-27 | Resolved: 2024-05-29

## 2024-05-29 PROBLEM — G95.9 DISEASE OF SPINAL CORD (HCC): Status: RESOLVED | Noted: 2022-12-07 | Resolved: 2024-05-29

## 2024-05-29 PROCEDURE — 99213 OFFICE O/P EST LOW 20 MIN: CPT | Performed by: NURSE PRACTITIONER

## 2024-05-29 NOTE — PROGRESS NOTES
Assessment/Plan:    1. Rash and nonspecific skin eruption      The case discussed with patient using patient centered shared decision making.The patient was counseled regarding instructions for management,-- risk factor reductions,-- prognosis,-- impressions,-- risks and benefits of treatment options,-- importance of compliance with treatment. I have reviewed the instructions with the patient, answering all questions to her satisfaction.    Intermittent rash not present at office visit. No associated s/s  Etiology may be heat rash -> outdoor temps recently 80s. Vs idiopathic  Can use zyrtec, topical otc hydrocortisone if reappears.   Watchful waiting.  Pt has her 6 month follow scheduled with me in 2 weeks. I will recheck rash situation at that time    There are no Patient Instructions on file for this visit.    No follow-ups on file.    Subjective:      Patient ID: Abigail Garcia is a 58 y.o. female.    Chief Complaint   Patient presents with    Rash     Patient reports rash on legs for past 2 weeks       Rash  This is a new problem. Episode onset: 2 weeks ago. The problem has been waxing and waning (not present at time of visit) since onset. Location: b/l legs. The problem is mild. The rash is characterized by redness and itchiness. It is unknown if there was an exposure to a precipitant. Pertinent negatives include no cough, fatigue or fever. Past treatments include topical steroids and antihistamine. The treatment provided significant relief.       The following portions of the patient's history were reviewed and updated as appropriate: allergies, current medications, past family history, past medical history, past social history, past surgical history and problem list.    Review of Systems   Constitutional:  Negative for fatigue and fever.   HENT: Negative.     Respiratory:  Negative for cough.    Cardiovascular: Negative.    Gastrointestinal:  Negative for abdominal pain.   Skin:  Positive for rash.      "    Current Outpatient Medications   Medication Sig Dispense Refill    rosuvastatin (CRESTOR) 20 MG tablet TAKE 1 AND 1/2 TABLETS BY MOUTH  DAILY 135 tablet 0     No current facility-administered medications for this visit.       Objective:    /66 (BP Location: Left arm, Patient Position: Sitting, Cuff Size: Standard)   Pulse 82   Temp 97.7 °F (36.5 °C) (Temporal)   Resp 16   Ht 5' 5\" (1.651 m)   Wt 73.5 kg (162 lb)   LMP  (LMP Unknown) Comment: LMP 2009  SpO2 97%   BMI 26.96 kg/m²        Physical Exam  Vitals and nursing note reviewed.   Constitutional:       General: She is not in acute distress.     Appearance: Normal appearance.   Skin:     General: Skin is warm and dry.      Findings: No rash (rash not present).   Neurological:      Mental Status: She is alert.   Psychiatric:         Mood and Affect: Mood normal.                TARAS Collier  "

## 2024-06-03 ENCOUNTER — RA CDI HCC (OUTPATIENT)
Dept: OTHER | Facility: HOSPITAL | Age: 59
End: 2024-06-03

## 2024-06-03 ENCOUNTER — TELEPHONE (OUTPATIENT)
Age: 59
End: 2024-06-03

## 2024-06-03 DIAGNOSIS — R21 RASH AND NONSPECIFIC SKIN ERUPTION: Primary | ICD-10-CM

## 2024-06-03 RX ORDER — TRIAMCINOLONE ACETONIDE 5 MG/G
CREAM TOPICAL 2 TIMES DAILY
Qty: 30 G | Refills: 3 | Status: SHIPPED | OUTPATIENT
Start: 2024-06-03 | End: 2024-06-05 | Stop reason: SDUPTHER

## 2024-06-03 NOTE — TELEPHONE ENCOUNTER
Patient called and states rash on legs/thighs has gotten worse, unable to sleep at night very itchy. Would like to know if something can be sent to pharmacy, [facility]. States zyrtec and lotion not working at all.

## 2024-06-03 NOTE — PROGRESS NOTES
HCC coding opportunities       Chart reviewed, no opportunity found: CHART REVIEWED, NO OPPORTUNITY FOUND        Patients Insurance        Commercial Insurance: [email or web address]

## 2024-06-05 DIAGNOSIS — R21 RASH AND NONSPECIFIC SKIN ERUPTION: ICD-10-CM

## 2024-06-05 RX ORDER — TRIAMCINOLONE ACETONIDE 5 MG/G
CREAM TOPICAL 2 TIMES DAILY
Qty: 30 G | Refills: 0 | Status: SHIPPED | OUTPATIENT
Start: 2024-06-05

## 2024-06-05 NOTE — TELEPHONE ENCOUNTER
Reason for call: medication sent to wrong pharmacy   [x] Refill   [] Prior Auth  [] Other:     Office:   [x] PCP/Provider -   [] Specialty/Provider -     Medication:         Does the patient have enough for 3 days?   [] Yes   [x] No - Send as HP to POD

## 2024-06-10 ENCOUNTER — OFFICE VISIT (OUTPATIENT)
Dept: FAMILY MEDICINE CLINIC | Facility: CLINIC | Age: 59
End: 2024-06-10
Payer: COMMERCIAL

## 2024-06-10 VITALS
DIASTOLIC BLOOD PRESSURE: 80 MMHG | HEIGHT: 65 IN | HEART RATE: 73 BPM | OXYGEN SATURATION: 96 % | SYSTOLIC BLOOD PRESSURE: 126 MMHG | WEIGHT: 162 LBS | BODY MASS INDEX: 26.99 KG/M2 | RESPIRATION RATE: 16 BRPM | TEMPERATURE: 97.9 F

## 2024-06-10 DIAGNOSIS — Z12.11 COLON CANCER SCREENING: ICD-10-CM

## 2024-06-10 DIAGNOSIS — E55.9 VITAMIN D DEFICIENCY: ICD-10-CM

## 2024-06-10 DIAGNOSIS — R73.03 PREDIABETES: ICD-10-CM

## 2024-06-10 DIAGNOSIS — R21 RASH AND NONSPECIFIC SKIN ERUPTION: ICD-10-CM

## 2024-06-10 DIAGNOSIS — Z13.0 SCREENING, DEFICIENCY ANEMIA, IRON: ICD-10-CM

## 2024-06-10 DIAGNOSIS — E78.2 MIXED HYPERLIPIDEMIA: Primary | ICD-10-CM

## 2024-06-10 DIAGNOSIS — R79.89 ELEVATED TSH: ICD-10-CM

## 2024-06-10 PROCEDURE — 99214 OFFICE O/P EST MOD 30 MIN: CPT | Performed by: NURSE PRACTITIONER

## 2024-06-10 RX ORDER — ROSUVASTATIN CALCIUM 10 MG/1
10 TABLET, COATED ORAL DAILY
Qty: 90 TABLET | Refills: 1 | Status: SHIPPED | OUTPATIENT
Start: 2024-06-10

## 2024-06-10 NOTE — PROGRESS NOTES
Assessment/Plan:    1. Mixed hyperlipidemia  Comments:  Optimized on Crestor  Orders:  -     TSH, 3rd generation with Free T4 reflex  -     T3, free; Future  -     Lipid panel; Future  -     Comprehensive metabolic panel  -     Ambulatory Referral to Gastroenterology; Future  -     rosuvastatin (CRESTOR) 10 MG tablet; Take 1 tablet (10 mg total) by mouth daily  2. Vitamin D deficiency  -     Vitamin D 25 hydroxy; Future  3. Prediabetes  -     Hemoglobin A1C; Future  4. Screening, deficiency anemia, iron  -     CBC and differential; Future  5. Elevated TSH  -     TSH, 3rd generation with Free T4 reflex  -     T3, free; Future  6. Colon cancer screening  Comments:  Last performed 2016  7. Rash and nonspecific skin eruption  Comments:  Resolving      The case discussed with patient using patient centered shared decision making.The patient was counseled regarding instructions for management,-- risk factor reductions,-- prognosis,-- impressions,-- risks and benefits of treatment options,-- importance of compliance with treatment. I have reviewed the instructions with the patient, answering all questions to her satisfaction.        Depression Screening and Follow-up Plan: Patient was screened for depression during today's encounter. They screened negative with a PHQ-2 score of 0.       1.  Dyslipidemia  Optimized on current dose of Crestor  Continue the same   Recheck lipid panel December 2024    2.  Rash  Resolving  Follow-up as needed    3.  Colon cancer screening  Last colonoscopy 2016  Provided phone number to schedule-gastroenterologist retired    Patient clinically stable  Encouraged regular activity, heart healthy diet  Return to office for physical in 6 months      Return in about 6 months (around 12/12/2024) for Annual physical.    I have spent a total time of 30 minutes on 06/10/24 in caring for this patient including Prognosis, Risks and benefits of tx options, Instructions for management, Patient and family  education, Importance of tx compliance, Risk factor reductions, Impressions, Counseling / Coordination of care, Documenting in the medical record, Reviewing / ordering tests, medicine, procedures  , and Obtaining or reviewing history  .    Subjective:      Patient ID: Abigail Garcia is a 58 y.o. female.    Chief Complaint   Patient presents with    Follow-up     6 month       58-year-old female with history of dyslipidemia, colonic and gastric polyps, diverticulitis known to me presents for 6-month follow-up  I saw her 2 weeks ago with rash-it is resolving with OTC care measures-etiology still unknown    She has been on Crestor for some years.   Last lipid panel optimal range    She has family history of premature heart disease  She had cardiac testing last year which was unremarkable  She is doing well overall    Comprehensive blood panel in January of this year was overall acceptable except thyroid panel was slightly elevated    She has no complaints today      Reviewed medical history in detail. Changes to medical record changed where appropriate. Reviewed medications. Patient taking as prescribed. Tolerating well. Denies Side effects. Reviewed recent visits with specialists co-managing chronic conditions.     The following portions of the patient's history were reviewed and updated as appropriate: allergies, current medications, past family history, past medical history, past social history, past surgical history and problem list.    Review of Systems   Constitutional:  Negative for fatigue, fever and unexpected weight change.   HENT:  Negative for trouble swallowing.    Respiratory:  Negative for cough and shortness of breath.    Cardiovascular:  Negative for chest pain, palpitations and leg swelling.   Gastrointestinal:  Negative for abdominal pain and blood in stool.   Endocrine: Negative.    Genitourinary:  Negative for difficulty urinating, dysuria and hematuria.   Musculoskeletal:  Negative for arthralgias.  "  Skin:  Negative for pallor.   Neurological:  Negative for dizziness, tremors, seizures, syncope and weakness.   Psychiatric/Behavioral:  Negative for confusion and dysphoric mood. The patient is not nervous/anxious.          Current Outpatient Medications   Medication Sig Dispense Refill    rosuvastatin (CRESTOR) 10 MG tablet Take 1 tablet (10 mg total) by mouth daily 90 tablet 1    triamcinolone (KENALOG) 0.5 % cream Apply topically 2 (two) times a day 30 g 0     No current facility-administered medications for this visit.       Patient Active Problem List   Diagnosis    Hyperlipidemia    History of breast cancer    Heart palpitations    History of mammogram    Vitamin D deficiency    Impaired fasting glucose    Prediabetes    Rash and nonspecific skin eruption    Allergic contact dermatitis due to plants, except food    Actinic keratosis    Seborrheic keratosis, inflamed    Family history of early CAD    Epigastric pain       Objective:    /80 (BP Location: Left arm, Patient Position: Sitting, Cuff Size: Standard)   Pulse 73   Temp 97.9 °F (36.6 °C) (Temporal)   Resp 16   Ht 5' 5\" (1.651 m)   Wt 73.5 kg (162 lb)   LMP  (LMP Unknown) Comment: LMP 2009  SpO2 96%   BMI 26.96 kg/m²        Physical Exam  Vitals and nursing note reviewed.   Constitutional:       General: She is not in acute distress.     Appearance: Normal appearance.   HENT:      Head: Normocephalic and atraumatic.   Neck:      Vascular: No carotid bruit.   Cardiovascular:      Rate and Rhythm: Normal rate and regular rhythm. No extrasystoles are present.     Pulses: Normal pulses.      Heart sounds: Normal heart sounds. No murmur heard.     No S3 or S4 sounds.   Pulmonary:      Effort: Pulmonary effort is normal.      Breath sounds: Normal breath sounds.   Abdominal:      General: Bowel sounds are normal.      Palpations: Abdomen is soft.      Tenderness: There is no abdominal tenderness.   Musculoskeletal:      Right lower leg: No " edema.      Left lower leg: No edema.   Lymphadenopathy:      Cervical: No cervical adenopathy.   Skin:     General: Skin is warm and dry.      Coloration: Skin is not pale.   Neurological:      General: No focal deficit present.      Mental Status: She is alert. Mental status is at baseline.      Motor: No weakness.      Gait: Gait normal.   Psychiatric:         Mood and Affect: Mood normal.         Behavior: Behavior normal.                TARAS Collier

## 2024-08-08 ENCOUNTER — OFFICE VISIT (OUTPATIENT)
Dept: FAMILY MEDICINE CLINIC | Facility: CLINIC | Age: 59
End: 2024-08-08
Payer: COMMERCIAL

## 2024-08-08 VITALS
WEIGHT: 164 LBS | TEMPERATURE: 98.4 F | HEART RATE: 79 BPM | SYSTOLIC BLOOD PRESSURE: 122 MMHG | BODY MASS INDEX: 27.32 KG/M2 | OXYGEN SATURATION: 97 % | DIASTOLIC BLOOD PRESSURE: 74 MMHG | HEIGHT: 65 IN

## 2024-08-08 DIAGNOSIS — M50.30 DDD (DEGENERATIVE DISC DISEASE), CERVICAL: ICD-10-CM

## 2024-08-08 DIAGNOSIS — Z85.3 HISTORY OF BREAST CANCER: ICD-10-CM

## 2024-08-08 DIAGNOSIS — M51.36 DDD (DEGENERATIVE DISC DISEASE), LUMBAR: ICD-10-CM

## 2024-08-08 DIAGNOSIS — G44.209 MUSCLE CONTRACTION HEADACHE: Primary | ICD-10-CM

## 2024-08-08 DIAGNOSIS — E78.00 PURE HYPERCHOLESTEROLEMIA: ICD-10-CM

## 2024-08-08 DIAGNOSIS — M51.34 DDD (DEGENERATIVE DISC DISEASE), THORACIC: ICD-10-CM

## 2024-08-08 PROCEDURE — 99214 OFFICE O/P EST MOD 30 MIN: CPT | Performed by: FAMILY MEDICINE

## 2024-08-08 RX ORDER — METHOCARBAMOL 500 MG/1
500 TABLET, FILM COATED ORAL 3 TIMES DAILY
Qty: 30 TABLET | Refills: 1 | Status: SHIPPED | OUTPATIENT
Start: 2024-08-08

## 2024-08-08 NOTE — PROGRESS NOTES
Ambulatory Visit  Name: Abigail Garcia      : 1965      MRN: [id number]  Encounter Provider: CHAD Mendez DO  Encounter Date: 2024   Encounter department: [facility] [place]    Assessment & Plan  Muscle contraction headache         DDD (degenerative disc disease), cervical    Orders:    XR spine thoracic 3 vw; Future    XR spine cervical complete 4 or 5 vw non injury; Future    XR spine lumbar 2 or 3 views injury; Future    methocarbamol (ROBAXIN) 500 mg tablet; Take 1 tablet (500 mg total) by mouth 3 (three) times a day    DDD (degenerative disc disease), lumbar    Orders:    XR spine thoracic 3 vw; Future    XR spine cervical complete 4 or 5 vw non injury; Future    XR spine lumbar 2 or 3 views injury; Future    DDD (degenerative disc disease), thoracic    Orders:    XR spine thoracic 3 vw; Future    XR spine cervical complete 4 or 5 vw non injury; Future    XR spine lumbar 2 or 3 views injury; Future    History of breast cancer         Pure hypercholesterolemia          1. Muscle contraction headache  Comments:  Headache is at base of skull bilateral posterior aspect.  Not tender over occipital notch--but still a consideration  2. DDD (degenerative disc disease), cervical  Comments:  Evaluate for DJD DDD, congenital abnormality for further evaluation of muscle contraction tension  Orders:  -     XR spine thoracic 3 vw; Future; Expected date: 2024  -     XR spine cervical complete 4 or 5 vw non injury; Future; Expected date: 2024  -     XR spine lumbar 2 or 3 views injury; Future; Expected date: 2024  -     methocarbamol (ROBAXIN) 500 mg tablet; Take 1 tablet (500 mg total) by mouth 3 (three) times a day  3. DDD (degenerative disc disease), lumbar  -     XR spine thoracic 3 vw; Future; Expected date: 2024  -     XR spine cervical complete 4 or 5 vw non injury; Future; Expected date: 2024  -     XR spine lumbar 2 or 3 views injury; Future; Expected date:  "08/08/2024  4. DDD (degenerative disc disease), thoracic  Comments:  Patient notes \"wraparound\" pain or distress and anterior rib cage--evaluate thoracic spine  Orders:  -     XR spine thoracic 3 vw; Future; Expected date: 08/08/2024  -     XR spine cervical complete 4 or 5 vw non injury; Future; Expected date: 08/08/2024  -     XR spine lumbar 2 or 3 views injury; Future; Expected date: 08/08/2024  5. History of breast cancer  Comments:  This is remote but needs attention  Assessment & Plan:         6. Pure hypercholesterolemia  Comments:  Taking Crestor 10 mg once daily with good results  Assessment & Plan:               History of Present Illness     History of Present Illness  The patient is a 59-year-old female who presents for evaluation of multiple medical concerns.    She has been diagnosed with fatty liver. Approximately 25 years ago, she was diagnosed with herniated discs in her neck. She was advised to avoid lifting heavy objects. Occasionally, she experiences flare-ups of neck pain if she sleeps in an awkward position, which typically resolve within 4 to 5 days. However, her current episode is different and has been ongoing for about a month. She also reports pain in her rib cage and lower back. The pain is more noticeable when she is at rest, such as when watching TV at night. She has a history of left-sided diverticulitis. A few days ago, she began experiencing abdominal pain, which she initially thought was appendicitis. The pain is not consistent. She denies any nausea or loss of appetite. Her bowel movements are regular, typically occurring every day or every other day. She consumes a significant amount of fruits and vegetables. She denies any swelling in her feet.    She has a history of stage 1 breast cancer 15 years ago. She underwent chemotherapy as a precautionary measure due to her Oncotype score being slightly higher. Her oncologist was Dr. Hadley Kelley.    She occasionally experiences " "lower back pain. Two years ago, she fractured her back at T7 or T8.    She occasionally experiences headaches. To manage the pain, she takes Extra Strength Tylenol (2 tablets twice a day) and applies moist heat to the area. Her sleep is generally good, although some nights she has difficulty falling asleep.    SOCIAL HISTORY  She works as a hairdresser. She has never smoked.    FAMILY HISTORY  Her mother had a heart attack at 60.     Review of Systems   Constitutional: Negative.    HENT: Negative.     Eyes: Negative.    Respiratory: Negative.     Cardiovascular: Negative.    Gastrointestinal:  Positive for abdominal pain (actually, just a distress in the RLQ area. Will keep watch on it). Negative for blood in stool, constipation, diarrhea and nausea.        Some R LQ distress PRN, not consistant. Will follow   Endocrine: Negative.         Interestingly, we discussed the weight loss medication Zepbound and the appropriateness for her.  Her BMI is 27.29 and her oncologist 15 years ago recommended that she not put on weight as she gets older because her breast cancer was of the type that fuels on obesity.  She would do well to lose 20 pounds and this may be the modality that makes that happen.  She will research it consider it \"over time\".   Genitourinary: Negative.         Hx of breast cancer, remote   Musculoskeletal:  Positive for arthralgias, back pain and neck pain.   Skin: Negative.    Neurological: Negative.    Psychiatric/Behavioral: Negative.       Objective     /74 (BP Location: Left arm, Patient Position: Sitting, Cuff Size: Standard)   Pulse 79   Temp 98.4 °F (36.9 °C) (Temporal)   Ht 5' 5\" (1.651 m)   Wt 74.4 kg (164 lb)   LMP  (LMP Unknown) Comment: LMP 2009  SpO2 97%   BMI 27.29 kg/m²     Physical Exam  Vital Signs  Patient's weight is 164 pounds. BMI is 27.29.  Physical Exam  Vitals and nursing note reviewed.   Constitutional:       General: She is not in acute distress.     Appearance: " Normal appearance. She is not ill-appearing, toxic-appearing or diaphoretic.   HENT:      Head: Normocephalic.      Nose: Nose normal.      Mouth/Throat:      Mouth: Mucous membranes are moist.      Pharynx: No oropharyngeal exudate or posterior oropharyngeal erythema.   Eyes:      Extraocular Movements: Extraocular movements intact.      Pupils: Pupils are equal, round, and reactive to light.   Cardiovascular:      Rate and Rhythm: Normal rate and regular rhythm.      Pulses: Normal pulses.      Heart sounds: Normal heart sounds.      No gallop.   Pulmonary:      Effort: No respiratory distress.      Breath sounds: Normal breath sounds. No wheezing, rhonchi or rales.   Abdominal:      General: Abdomen is flat.   Musculoskeletal:         General: No swelling, tenderness, deformity or signs of injury.      Cervical back: Normal range of motion and neck supple.      Right lower leg: No edema.      Left lower leg: No edema.      Comments: Sounds like muscle contraction headache and the sob occipital region.  Will treat with Tylenol and muscle relaxer.  Consider adding Mobic and also physical therapy.  We talked about those modalities and I highly recommended physical therapy she will try increasing her Tylenol up to 1000 mg 3 times daily and adding the Robaxin 500 mg 1 at bedtime.  She will apply moist heat at home and let me know how things are going in 1 week   Skin:     General: Skin is warm and dry.   Neurological:      General: No focal deficit present.      Mental Status: She is alert and oriented to person, place, and time. Mental status is at baseline.   Psychiatric:         Mood and Affect: Mood normal.         Behavior: Behavior normal.         Thought Content: Thought content normal.         Judgment: Judgment normal.       Administrative Statements   I have spent a total time of 35 minutes in caring for this patient on the day of the visit/encounter including Diagnostic results, Prognosis, Risks and  benefits of tx options, Instructions for management, Patient and family education, Importance of tx compliance, Risk factor reductions, Impressions, Counseling / Coordination of care, Documenting in the medical record, Reviewing / ordering tests, medicine, procedures  , and Obtaining or reviewing history  .

## 2024-08-09 ENCOUNTER — HOSPITAL ENCOUNTER (OUTPATIENT)
Dept: RADIOLOGY | Facility: HOSPITAL | Age: 59
End: 2024-08-09
Payer: COMMERCIAL

## 2024-08-09 DIAGNOSIS — M51.36 DDD (DEGENERATIVE DISC DISEASE), LUMBAR: ICD-10-CM

## 2024-08-09 DIAGNOSIS — M50.30 DDD (DEGENERATIVE DISC DISEASE), CERVICAL: ICD-10-CM

## 2024-08-09 DIAGNOSIS — M51.34 DDD (DEGENERATIVE DISC DISEASE), THORACIC: ICD-10-CM

## 2024-08-09 PROCEDURE — 72072 X-RAY EXAM THORAC SPINE 3VWS: CPT

## 2024-08-09 PROCEDURE — 72050 X-RAY EXAM NECK SPINE 4/5VWS: CPT

## 2024-08-09 PROCEDURE — 72110 X-RAY EXAM L-2 SPINE 4/>VWS: CPT

## 2024-08-12 DIAGNOSIS — M89.8X9 BONE PAIN: Primary | ICD-10-CM

## 2024-08-14 ENCOUNTER — APPOINTMENT (OUTPATIENT)
Dept: LAB | Facility: AMBULARY SURGERY CENTER | Age: 59
End: 2024-08-14
Payer: COMMERCIAL

## 2024-08-14 DIAGNOSIS — M89.8X9 BONE PAIN: ICD-10-CM

## 2024-08-14 LAB
CRP SERPL QL: 2.1 MG/L
ERYTHROCYTE [SEDIMENTATION RATE] IN BLOOD: 17 MM/HOUR (ref 0–29)

## 2024-08-14 PROCEDURE — 82232 ASSAY OF BETA-2 PROTEIN: CPT

## 2024-08-14 PROCEDURE — 86140 C-REACTIVE PROTEIN: CPT

## 2024-08-14 PROCEDURE — 36415 COLL VENOUS BLD VENIPUNCTURE: CPT

## 2024-08-14 PROCEDURE — 85652 RBC SED RATE AUTOMATED: CPT

## 2024-08-16 LAB — B2 MICROGLOB SERPL-MCNC: 1.3 MG/L (ref 0.6–2.4)

## 2024-08-22 ENCOUNTER — OFFICE VISIT (OUTPATIENT)
Dept: FAMILY MEDICINE CLINIC | Facility: CLINIC | Age: 59
End: 2024-08-22
Payer: COMMERCIAL

## 2024-08-22 ENCOUNTER — APPOINTMENT (OUTPATIENT)
Dept: LAB | Facility: AMBULARY SURGERY CENTER | Age: 59
End: 2024-08-22
Payer: COMMERCIAL

## 2024-08-22 VITALS
DIASTOLIC BLOOD PRESSURE: 80 MMHG | WEIGHT: 164 LBS | OXYGEN SATURATION: 98 % | SYSTOLIC BLOOD PRESSURE: 126 MMHG | HEART RATE: 80 BPM | HEIGHT: 65 IN | BODY MASS INDEX: 27.32 KG/M2 | RESPIRATION RATE: 17 BRPM | TEMPERATURE: 98.3 F

## 2024-08-22 DIAGNOSIS — R10.10 PAIN OF UPPER ABDOMEN: ICD-10-CM

## 2024-08-22 DIAGNOSIS — Z91.89 RISK OF EXPOSURE TO LYME DISEASE: ICD-10-CM

## 2024-08-22 DIAGNOSIS — G44.89 OTHER HEADACHE SYNDROME: ICD-10-CM

## 2024-08-22 DIAGNOSIS — R79.89 ELEVATED TSH: ICD-10-CM

## 2024-08-22 DIAGNOSIS — E55.9 VITAMIN D DEFICIENCY: ICD-10-CM

## 2024-08-22 DIAGNOSIS — E78.2 MIXED HYPERLIPIDEMIA: ICD-10-CM

## 2024-08-22 DIAGNOSIS — R21 RASH: ICD-10-CM

## 2024-08-22 DIAGNOSIS — Z13.0 SCREENING, DEFICIENCY ANEMIA, IRON: ICD-10-CM

## 2024-08-22 DIAGNOSIS — R10.2 PELVIC PAIN: Primary | ICD-10-CM

## 2024-08-22 DIAGNOSIS — Z85.3 HISTORY OF BREAST CANCER: ICD-10-CM

## 2024-08-22 DIAGNOSIS — R10.2 PELVIC PAIN: ICD-10-CM

## 2024-08-22 DIAGNOSIS — R73.03 PREDIABETES: ICD-10-CM

## 2024-08-22 LAB
25(OH)D3 SERPL-MCNC: 25.4 NG/ML (ref 30–100)
ALBUMIN SERPL BCG-MCNC: 4.5 G/DL (ref 3.5–5)
ALP SERPL-CCNC: 70 U/L (ref 34–104)
ALT SERPL W P-5'-P-CCNC: 21 U/L (ref 7–52)
ANION GAP SERPL CALCULATED.3IONS-SCNC: 9 MMOL/L (ref 4–13)
AST SERPL W P-5'-P-CCNC: 19 U/L (ref 13–39)
BACTERIA UR QL AUTO: ABNORMAL /HPF
BASOPHILS # BLD AUTO: 0.04 THOUSANDS/ÂΜL (ref 0–0.1)
BASOPHILS NFR BLD AUTO: 1 % (ref 0–1)
BILIRUB SERPL-MCNC: 0.43 MG/DL (ref 0.2–1)
BILIRUB UR QL STRIP: NEGATIVE
BUN SERPL-MCNC: 10 MG/DL (ref 5–25)
CALCIUM SERPL-MCNC: 9.7 MG/DL (ref 8.4–10.2)
CHLORIDE SERPL-SCNC: 101 MMOL/L (ref 96–108)
CHOLEST SERPL-MCNC: 231 MG/DL
CLARITY UR: CLEAR
CO2 SERPL-SCNC: 31 MMOL/L (ref 21–32)
COLOR UR: ABNORMAL
CREAT SERPL-MCNC: 0.65 MG/DL (ref 0.6–1.3)
EOSINOPHIL # BLD AUTO: 0.06 THOUSAND/ÂΜL (ref 0–0.61)
EOSINOPHIL NFR BLD AUTO: 1 % (ref 0–6)
ERYTHROCYTE [DISTWIDTH] IN BLOOD BY AUTOMATED COUNT: 12 % (ref 11.6–15.1)
EST. AVERAGE GLUCOSE BLD GHB EST-MCNC: 120 MG/DL
GFR SERPL CREATININE-BSD FRML MDRD: 96 ML/MIN/1.73SQ M
GLUCOSE P FAST SERPL-MCNC: 90 MG/DL (ref 65–99)
GLUCOSE UR STRIP-MCNC: NEGATIVE MG/DL
HBA1C MFR BLD: 5.8 %
HCT VFR BLD AUTO: 40.6 % (ref 34.8–46.1)
HDLC SERPL-MCNC: 54 MG/DL
HGB BLD-MCNC: 13.5 G/DL (ref 11.5–15.4)
HGB UR QL STRIP.AUTO: NEGATIVE
IMM GRANULOCYTES # BLD AUTO: 0.02 THOUSAND/UL (ref 0–0.2)
IMM GRANULOCYTES NFR BLD AUTO: 0 % (ref 0–2)
KETONES UR STRIP-MCNC: NEGATIVE MG/DL
LDLC SERPL CALC-MCNC: 142 MG/DL (ref 0–100)
LEUKOCYTE ESTERASE UR QL STRIP: ABNORMAL
LYMPHOCYTES # BLD AUTO: 1.86 THOUSANDS/ÂΜL (ref 0.6–4.47)
LYMPHOCYTES NFR BLD AUTO: 30 % (ref 14–44)
MCH RBC QN AUTO: 29.8 PG (ref 26.8–34.3)
MCHC RBC AUTO-ENTMCNC: 33.3 G/DL (ref 31.4–37.4)
MCV RBC AUTO: 90 FL (ref 82–98)
MONOCYTES # BLD AUTO: 0.32 THOUSAND/ÂΜL (ref 0.17–1.22)
MONOCYTES NFR BLD AUTO: 5 % (ref 4–12)
MUCOUS THREADS UR QL AUTO: ABNORMAL
NEUTROPHILS # BLD AUTO: 4.01 THOUSANDS/ÂΜL (ref 1.85–7.62)
NEUTS SEG NFR BLD AUTO: 63 % (ref 43–75)
NITRITE UR QL STRIP: NEGATIVE
NON-SQ EPI CELLS URNS QL MICRO: ABNORMAL /HPF
NONHDLC SERPL-MCNC: 177 MG/DL
NRBC BLD AUTO-RTO: 0 /100 WBCS
PH UR STRIP.AUTO: 6 [PH]
PLATELET # BLD AUTO: 211 THOUSANDS/UL (ref 149–390)
PMV BLD AUTO: 11 FL (ref 8.9–12.7)
POTASSIUM SERPL-SCNC: 3.9 MMOL/L (ref 3.5–5.3)
PROT SERPL-MCNC: 7.5 G/DL (ref 6.4–8.4)
PROT UR STRIP-MCNC: ABNORMAL MG/DL
RBC # BLD AUTO: 4.53 MILLION/UL (ref 3.81–5.12)
RBC #/AREA URNS AUTO: ABNORMAL /HPF
SODIUM SERPL-SCNC: 141 MMOL/L (ref 135–147)
SP GR UR STRIP.AUTO: 1.02 (ref 1–1.03)
T3FREE SERPL-MCNC: 3.58 PG/ML (ref 2.5–3.9)
TRIGL SERPL-MCNC: 174 MG/DL
TSH SERPL DL<=0.05 MIU/L-ACNC: 3.58 UIU/ML (ref 0.45–4.5)
UROBILINOGEN UR STRIP-ACNC: <2 MG/DL
WBC # BLD AUTO: 6.31 THOUSAND/UL (ref 4.31–10.16)
WBC #/AREA URNS AUTO: ABNORMAL /HPF

## 2024-08-22 PROCEDURE — 83036 HEMOGLOBIN GLYCOSYLATED A1C: CPT

## 2024-08-22 PROCEDURE — 80061 LIPID PANEL: CPT

## 2024-08-22 PROCEDURE — 36415 COLL VENOUS BLD VENIPUNCTURE: CPT

## 2024-08-22 PROCEDURE — 82306 VITAMIN D 25 HYDROXY: CPT

## 2024-08-22 PROCEDURE — 86618 LYME DISEASE ANTIBODY: CPT

## 2024-08-22 PROCEDURE — 85025 COMPLETE CBC W/AUTO DIFF WBC: CPT

## 2024-08-22 PROCEDURE — 84481 FREE ASSAY (FT-3): CPT

## 2024-08-22 PROCEDURE — 99213 OFFICE O/P EST LOW 20 MIN: CPT | Performed by: NURSE PRACTITIONER

## 2024-08-22 PROCEDURE — 81001 URINALYSIS AUTO W/SCOPE: CPT

## 2024-08-23 LAB — B BURGDOR IGG+IGM SER QL IA: NEGATIVE

## 2024-08-27 ENCOUNTER — TELEPHONE (OUTPATIENT)
Age: 59
End: 2024-08-27

## 2024-08-27 NOTE — TELEPHONE ENCOUNTER
Patient calling because her CT was denied by insurance and she was told to reach out to office for further recommendation or if a peer to peer would be beneficial or warranted in this case.  Edie Smith

## 2024-08-27 NOTE — TELEPHONE ENCOUNTER
There is no documentation from our auth dept. I asked pt if she had phone number. I will be glad to do peer to peer

## 2024-08-28 ENCOUNTER — HOSPITAL ENCOUNTER (OUTPATIENT)
Dept: CT IMAGING | Facility: HOSPITAL | Age: 59
Discharge: HOME | End: 2024-08-28
Payer: COMMERCIAL

## 2024-08-28 DIAGNOSIS — R10.2 PELVIC PAIN: ICD-10-CM

## 2024-08-28 DIAGNOSIS — R10.10 PAIN OF UPPER ABDOMEN: ICD-10-CM

## 2024-08-28 DIAGNOSIS — Z85.3 HISTORY OF BREAST CANCER: ICD-10-CM

## 2024-08-28 PROCEDURE — 74177 CT ABD & PELVIS W/CONTRAST: CPT

## 2024-08-28 RX ADMIN — IOHEXOL 50 ML: 350 INJECTION, SOLUTION INTRAVENOUS at 08:34

## 2024-09-04 DIAGNOSIS — R93.41 ABNORMAL CT SCAN, BLADDER: Primary | ICD-10-CM

## 2024-09-04 DIAGNOSIS — Z80.52 FAMILY HISTORY OF BLADDER CANCER: ICD-10-CM

## 2024-09-04 DIAGNOSIS — N32.89 BLADDER WALL THICKENING: ICD-10-CM

## 2024-09-04 NOTE — TELEPHONE ENCOUNTER
Had a lengthy discussion with vimal. Reviewed results. Answered all questions. She is still having mild rib pain. I recommend NSAID trial. Bladder wall thickening seen will check bladder US and urine cytology. Will follow closely

## 2024-09-09 ENCOUNTER — APPOINTMENT (OUTPATIENT)
Dept: LAB | Facility: CLINIC | Age: 59
End: 2024-09-09
Payer: COMMERCIAL

## 2024-09-09 ENCOUNTER — HOSPITAL ENCOUNTER (OUTPATIENT)
Dept: ULTRASOUND IMAGING | Facility: HOSPITAL | Age: 59
Discharge: HOME | End: 2024-09-09
Payer: COMMERCIAL

## 2024-09-09 DIAGNOSIS — Z80.52 FAMILY HISTORY OF BLADDER CANCER: ICD-10-CM

## 2024-09-09 DIAGNOSIS — N32.89 BLADDER WALL THICKENING: ICD-10-CM

## 2024-09-09 DIAGNOSIS — R93.41 ABNORMAL CT SCAN, BLADDER: ICD-10-CM

## 2024-09-09 PROCEDURE — 88112 CYTOPATH CELL ENHANCE TECH: CPT | Performed by: PATHOLOGY

## 2024-09-09 PROCEDURE — 76775 US EXAM ABDO BACK WALL LIM: CPT

## 2024-09-11 ENCOUNTER — TELEPHONE (OUTPATIENT)
Dept: OTHER | Facility: OTHER | Age: 59
End: 2024-09-11

## 2024-09-11 PROCEDURE — 88112 CYTOPATH CELL ENHANCE TECH: CPT | Performed by: PATHOLOGY

## 2024-09-13 DIAGNOSIS — N81.10 BLADDER PROLAPSE, FEMALE, ACQUIRED: ICD-10-CM

## 2024-09-13 DIAGNOSIS — R82.89 ABNORMAL URINE CYTOLOGY: ICD-10-CM

## 2024-09-13 DIAGNOSIS — N32.89 BLADDER WALL THICKENING: Primary | ICD-10-CM

## 2024-09-16 ENCOUNTER — OFFICE VISIT (OUTPATIENT)
Age: 59
End: 2024-09-16
Payer: COMMERCIAL

## 2024-09-16 VITALS — OXYGEN SATURATION: 99 % | SYSTOLIC BLOOD PRESSURE: 130 MMHG | DIASTOLIC BLOOD PRESSURE: 74 MMHG | HEART RATE: 74 BPM

## 2024-09-16 DIAGNOSIS — M79.18 MYOFASCIAL PAIN: Primary | ICD-10-CM

## 2024-09-16 DIAGNOSIS — M99.03 SEGMENTAL DYSFUNCTION OF LUMBAR REGION: ICD-10-CM

## 2024-09-16 DIAGNOSIS — M53.3 SACROILIAC JOINT DYSFUNCTION: ICD-10-CM

## 2024-09-16 DIAGNOSIS — M99.02 SEGMENTAL DYSFUNCTION OF THORACIC REGION: ICD-10-CM

## 2024-09-16 DIAGNOSIS — M99.05 SOMATIC DYSFUNCTION OF HIP REGION: ICD-10-CM

## 2024-09-16 PROCEDURE — 98943 CHIROPRACT MANJ XTRSPINL 1/>: CPT | Performed by: CHIROPRACTOR

## 2024-09-16 PROCEDURE — 98941 CHIROPRACT MANJ 3-4 REGIONS: CPT | Performed by: CHIROPRACTOR

## 2024-09-16 PROCEDURE — 99213 OFFICE O/P EST LOW 20 MIN: CPT | Performed by: CHIROPRACTOR

## 2024-09-16 RX ORDER — ZINC SULFATE 50(220)MG
220 CAPSULE ORAL DAILY
COMMUNITY

## 2024-09-16 NOTE — PROGRESS NOTES
"   1. Myofascial pain        2. Segmental dysfunction of thoracic region        3. Sacroiliac joint dysfunction        4. Segmental dysfunction of lumbar region        5. Somatic dysfunction of hip region          Assessment/Plan:    Review of Diagnostic Studies and Office Notes:    The patient's August 26, 2024 CT scan report of the abdomen/pelvis, August 9, 2024 x-ray report of the lumbar spine, August 9, 2024 x-ray report of the cervical spine, x-ray report of the thoracic spine, March 18, 2024 mammogram report (no mammographic evidence of malignancy),  TARAS Roth August 22, 2024 family medicine office note (pelvic pain, pain of upper abdomen, rash, other headache symptoms, risk of exposure of Lyme's disease, history of breast cancer, \"Abgiail is a 59-year-old female well-known to me presents for same-day and follow-up to appointment from 8/8 Today she reports that earlier this month she developed episode of unwell feeling including posterior occiput headache, fatigue, chest rash, mid back pain which wraps around to upper abdomen She was evaluated in this office Blood work including inflammatory markers was ordered which was  normal She had x-rays of all 3 regions of her spine which was normal She returns today as she still does not feel herself and pain has migrated to pelvis She is concerned due to her history of breast cancer.  She is scared that she has new neoplasm She still feels fatigued, having muscle aches.  Rash did not a return Denies fever, chills, weight loss, inordinate abdominal bloating, rectal bleeding, vaginal bleeding, appetite change, mental status change\"), November 17, 2023 chiropractic recheck office note (myofascial pain on left side, thoracic segmental dysfunction, cervical segmental dysfunction, no limitations in function (neck index was scored as a 0) and discharged from chiropractic care), October 27, 2023 initial chiropractic evaluation office note (left suboccipital, neck " pain, difficulty turning neck to the left) were reviewed prior to the chiropractic evaluation today.    Narrative & Impression        CT ABDOMEN AND PELVIS WITH IV CONTRAST     INDICATION:   Pelvic and perineal pain. Upper abdominal pain, unspecified. Personal history of malignant neoplasm of breast.       COMPARISON:  None.     TECHNIQUE:  CT examination of the abdomen and pelvis was performed. Multiplanar 2D reformatted images were created from the source data.     This examination, like all CT scans performed in the [facility], was performed utilizing techniques to minimize radiation dose exposure, including the use of iterative reconstruction and automated exposure control. Radiation dose length   product (DLP) for this visit:     IV Contrast:  barium (READI-CAT 2) suspension 900 mL - iohexol (OMNIPAQUE) 350 MG/ML injection (MULTI-DOSE) 50 mL  Enteric Contrast:  Enteric contrast was administered.     FINDINGS:     ABDOMEN     LOWER CHEST: No clinically significant abnormality in the visualized lower chest.     LIVER/BILIARY TREE: Hepatic steatosis. No suspicious mass. Normal hepatic contours. No biliary dilation.     GALLBLADDER: No calcified gallstones. No pericholecystic inflammatory change.     SPLEEN: Unremarkable.     PANCREAS: Unremarkable.      ADRENAL GLANDS: Unremarkable.     KIDNEYS/URETERS: Simple renal cyst(s). Otherwise unremarkable kidneys. No hydronephrosis.     STOMACH AND BOWEL: Colonic diverticulosis without findings of acute diverticulitis.     APPENDIX: No findings to suggest appendicitis.     ABDOMINOPELVIC CAVITY: No ascites. No pneumoperitoneum. No lymphadenopathy.     VESSELS: Unremarkable for patient's age.     PELVIS     REPRODUCTIVE ORGANS: Unremarkable for patient's age.     URINARY BLADDER: Mild bladder wall thickening.     ABDOMINAL WALL/INGUINAL REGIONS: Unremarkable.     BONES: No acute fracture or suspicious osseous lesion. Spinal degenerative changes.      IMPRESSION:        1. Normal caliber bowel loops. Colonic diverticulosis.     2. No pelvic mass. Uterus and ovaries are normal in size. No cystocele.      3. No renal stones. Mild bladder wall thickening is noted which can be seen in chronic cystitis        Electronically signed: 08/29/2024 05:45 AM Silvestre Bernal MD    Narrative & Impression        THORACIC SPINE     INDICATION:   Other cervical disc degeneration, unspecified cervical region. Other intervertebral disc degeneration, lumbar region. Other intervertebral disc degeneration, thoracic region.      COMPARISON:  None.     VIEWS:  XR SPINE THORACIC 3 VW  Images: 3     FINDINGS:     There is no fracture or pathologic bone lesion.     Thoracic vertebral alignment is within normal limits.     Age related degenerative changes are seen.      There is no displacement of the paraspinal line.      The pedicles appear intact.     IMPRESSION:        No acute osseous abnormality.  Degenerative changes as described.     Electronically signed: 08/09/2024 05:34 PM Rodolfo Patel MPH,MD    Narrative & Impression        LUMBAR SPINE     INDICATION:   Other cervical disc degeneration, unspecified cervical region. Other intervertebral disc degeneration, lumbar region. Other intervertebral disc degeneration, thoracic region.    COMPARISON:  None.     VIEWS:  XR SPINE LUMBAR MINIMUM 4 VIEWS NON INJURY  Images: 4     FINDINGS:     There are 5 non rib bearing lumbar vertebral bodies.      There is no evidence of acute fracture or destructive osseous lesion.     Alignment is unremarkable.      Disc height loss L3-4 with endplate sclerosis with multilevel lower lumbar facet arthropathy.     The pedicles appear intact.     Soft tissues are unremarkable.     IMPRESSION:        No acute osseous abnormality.       Degenerative changes as described.     Electronically signed: 08/09/2024 05:41 PM Rodolfo Patel MPH,MD    Decision and Treatment Plan:    The patient is describing  bilateral lower thoracic pain that is more pronounced with movement indicating mechanical source of pain.  The patient had a CT scan of the abdomen/pelvis which did not reveal a source of the pain in the bilateral lower thoracic region.  The patient is also able to reproduce the pain in the lower thoracic region with prolonged trunk lateral flexion maneuvers such as when leaning while watching television.  She was advised to avoid leaning to 1 side when watching television.  She indicated she understood.  The patient has lower back pain that is worse upon waking in the morning.  The patient is sleeping in the correct sleeping postures. The patient has myofascial findings as well as thoracic facet restrictions, lumbar facet restrictions and a pelvic restriction which can contribute to limited range of motion and pain.  The patient has a history of right hip pain that had responded well to chiropractic care in the past.  The patient is only aware of the symptoms when she is going for walks for exercise.  She indicates that she is taking longer strides at that point.  She had failed to improve with other forms of conservative care including physical therapy prior to having chiropractic care with me in 2018 and 2021. The patient has myofascial findings as well as a right hip joint dysfunction restrictions which can contribute to limited range of motion and pain.    The patient will be treated with conservative chiropractic care including myofascial release, joint mobilization, and a home stretching and icing program.    The patient was taught lower thoracic/thoracolumbar cat in the lower back stretches today. The patient was advised to do the stretch for 3 sets of 15-20 seconds several times per day.The need to stretch to the point of tension only was discussed. ROM was measured with an [employer] digital dual inclinometer.    The patient will initially be seen 2 times per week and the frequency of treatment will be reduced  to 1 time per week as there are decreased symptoms and improvement in objective findings. The patient will then be discharged.    Patient Instructions:    The patient was advised to apply ice to the region in 15-minute intervals a minimum of 3 times per day.   The patient was informed that she may experience additional soreness following the today's treatment visit. The need to continue with the stretching exercises and apply ice in 15-minute intervals was discussed with the patient.   The patient was informed they may see redness or possible bruising in the region of Graston technique treatment.   The patient was advised to avoid sleeping in the prone position. Proper sleeping postures and use of pillows were discussed.  Avoid leaning with trunk while watchig TV.    Goals:    JMLGOALS: Improve patient's ability to tolerate prolonged walking, Improve patient's ability to tolerate prolonged physical activity, and decreased pain upon waking in the morning    TIME/Reviewed with Patient:  At least 20 minutes of time was spent with the patient during the consultation including the patient's history/current complaints, physical exam, reviewing the diagnostic report/office notes, reviewing home instruction/reducing risk factors with the patient, reviewing my findings/x-ray reports and CT scan report abdomen/pelvis with the patient, and discussing the treatment/treatment plan with the patient.    This is a separate identifiable portion of today's visit from the E and M code billed.    Treatment today consisted of myofascial release via Graston Technique to the bilateral lower thoracic/thoracolumbar erector spinae.  Myofascial release via active release technique was performed to the right adductor angela.    Manipulation was performed to the left sacroiliac joint via Mari low force drop technique. Contact was made to the left ischial tuberosity and the right lateral aspect of the  apex of the sacrum. Manipulation was  performed to the lumbar restrictions via manual lumbar flexion distraction technique. Manipulation was performed to the lower thoracic restrictions via grade 2 mobilization techniques and low force diversified maneuvers.  No high velocity thrust was applied.    Manipulation was performed to the right femoral acetabular joint via grade 1 mobilization techniques and long axis traction technique.    Subjective:      Abigail Garcia is a 59 y.o. female who presents today with pain in the bilateral lower thoracic region (bra level) as well as the bilateral lower back region.  She stated that the pain in the bilateral lower thoracic region and bilateral lower back region began at the end of July/early part of August 2024. The patient confirmed that she had an evaluation with TARAS Roth (internal medicine) and was referred for diagnostic studies including a CT scan of the abdomen/pelvis and x-rays of the thoracic and lumbar spine.  The patient stated there was no incident or trauma that caused her to experience the pain.  Instead it was a gradual onset of symptoms.  She stated that she has a history of the symptoms in the bilateral lower thoracic region occurring a few years ago.  Once again she stated that the tests were negative and the pain had resolved.  She stated that her current pain has not been resolving and therefore she was concerned and was evaluated by her internal medicine physician.  The patient denied experiencing pain or paresthesias in the lower extremities.  She stated that the pain in the bilateral lower thoracic region and lower back region are intermittently noted.  She stated that she has pain in the lower thoracic region when she is sitting with her trunk laterally flexed and leaning to the side while watching television.  She stated that she wakes up in the morning with some discomfort in her lower back.  She stated that she typically sleeps on her right side with a pillow between her knees.  " The patient stated that her sleep has not interfered by her back symptoms.  She denied having changes in bowel or bladder function.  She described the pain as a \"dull aching pain.\" The patient described her back pain as a 2 on a 0-10 pain scale to my assistant at the time of today's visit. On her intake paperwork she described her pain level as a 3 on a 0-10 pain scale today.  She describes her pain level as a 5 on a 0-10 pain scale at its worst.  The patient also describes experiencing chronic right groin pain.  She stated that I had treated this region of pain successfully in the past.  She stated that she does not have the pain in her right groin region when she is walking around her house.  However when she walks for exercise and is taking longer strides she has pain in the right groin region.  The patient described her right groin pain as a 5 on a 0-10 pain scale when she is walking.    Objective:    Vitals:    09/16/24 1132   BP: 130/74   Pulse: 74   SpO2: 99%         Appearance: Well developed, well nourished, and in no acute distress    Alert and oriented x 3    Mood/Affect: calm and pleasant    Gait/Posture:    Gait: Normal    Antalgic posture: None    Lordosis: Lumbar- normal    Kyphosis: Thoracic- normal    Lower extremity reflexes:    Deep tendon reflexes were normal and symmetrical in the bilateral lower extremities.    Lower Extremity Muscle Testing:    Muscle testing of the bilateral lower extremities was normal and graded +5/5.    Sensory:    Sensation to light touch was normal and symmetrical in the bilateral lower extremities (L4, L5, and S1 dermatomes were tested).    Babinski was negative bilaterally.    Lumbar Orthopedic Tests:    Seated Straight Leg Raise was negative  bilaterally.    Supine Straight Leg Raise was negative  on the Right.    Supine Straight Leg Raise was negative  on the Left. Significant finding of minimal/mild inflexibility in the bilateral hamstrings.    Cj Test was " negative  bilaterally.    Hip evaluation:    Passive internal rotation of the Right/left: right femoroacetabular joint was negative for limited range of motion or reproduction of pain.    Passive external rotation of the Right/left: right femoroacetabular joint was positive for mild limitation in motion but no reproduction of pain..    Motion Palpation:    The Right/left: right femoroacetabular joint was restricted from external rotation.    Active Lumbar Ranges of Motion:    Flexion: Allowed the patient to touch the distal 1/4 of the tibia to the ankles. Discomfort in upper abdominal region with bending at the waist.    Extension: 33 degrees    Right lateral flexion: 23 degrees    Left Lateral flexion: 29 degrees    Palpation:    Negative Derefield on the left. Active myofascial trigger points were present in the bilateral lower thoracic/thoracolumbar erector spinae, bilateral lumbar erector spinae, quadratus lumborum, and gluteus medius. Pressure to the above listed trigger points reproduced some of the pain described in today's chief complaint but also more reported to provide relief. Intersegmental motion was decreased in the thoracic spine including joint dysfunctions at T3-4, T6-7, T9-10 and T10-11. Intersegmental motion was decreased in the lumbar spine including joint dysfunctions at L2-3 and L5-S1. Intersegmental motion was decreased in the left sacroiliac joint.  The right femoral acetabular joint was restricted from external rotation.       Dictation voice to text software has been used in the creation of this document. Please consider this in light of any contextual or grammatical errors.

## 2024-09-16 NOTE — PATIENT INSTRUCTIONS
The patient was advised to apply ice to the region in 15-minute intervals a minimum of 3 times per day.   The patient was informed that she may experience additional soreness following the today's treatment visit. The need to continue with the stretching exercises and apply ice in 15-minute intervals was discussed with the patient.   The patient was informed they may see redness or possible bruising in the region of Graston technique treatment.   The patient was advised to avoid sleeping in the prone position. Proper sleeping postures and use of pillows were discussed.  Avoid leaning with trunk while watchig TV.

## 2024-09-18 ENCOUNTER — PROCEDURE VISIT (OUTPATIENT)
Age: 59
End: 2024-09-18
Payer: COMMERCIAL

## 2024-09-18 VITALS
SYSTOLIC BLOOD PRESSURE: 110 MMHG | OXYGEN SATURATION: 98 % | DIASTOLIC BLOOD PRESSURE: 70 MMHG | BODY MASS INDEX: 27.32 KG/M2 | HEART RATE: 79 BPM | WEIGHT: 164 LBS | HEIGHT: 65 IN

## 2024-09-18 DIAGNOSIS — M53.3 SACROILIAC JOINT DYSFUNCTION: ICD-10-CM

## 2024-09-18 DIAGNOSIS — M99.02 SEGMENTAL DYSFUNCTION OF THORACIC REGION: ICD-10-CM

## 2024-09-18 DIAGNOSIS — M79.18 MYOFASCIAL PAIN: Primary | ICD-10-CM

## 2024-09-18 DIAGNOSIS — M99.05 SOMATIC DYSFUNCTION OF HIP REGION: ICD-10-CM

## 2024-09-18 DIAGNOSIS — M99.03 SEGMENTAL DYSFUNCTION OF LUMBAR REGION: ICD-10-CM

## 2024-09-18 PROCEDURE — 98941 CHIROPRACT MANJ 3-4 REGIONS: CPT | Performed by: CHIROPRACTOR

## 2024-09-18 PROCEDURE — 98943 CHIROPRACT MANJ XTRSPINL 1/>: CPT | Performed by: CHIROPRACTOR

## 2024-09-18 NOTE — PROGRESS NOTES
Assessment:  The patient is progressing as expected.  The patient's treatment was focused on her hip and back pain again today.  Although I may address her cervical/temporal symptoms that she described today.  In the meantime she was advised to avoid rolling the neck forward when doing the superior trapezius stretch.    1. Myofascial pain        2. Segmental dysfunction of thoracic region        3. Sacroiliac joint dysfunction        4. Segmental dysfunction of lumbar region        5. Somatic dysfunction of hip region          Plan:  Treatment today consisted of myofascial release via active release technique to the bilateral lower thoracic/thoracolumbar erector spinae (prone position, 4 finger contact).  Myofascial release via active release technique was performed to the right adductor angela.    Manipulation was performed to the left sacroiliac joint via Mari low force drop technique. Contact was made to the left ischial tuberosity and the right lateral aspect of the  apex of the sacrum. Manipulation was performed to the lumbar restrictions via manual lumbar flexion distraction technique. Manipulation was performed to the lower thoracic restrictions via grade 2 mobilization techniques and low force diversified maneuvers.  No high velocity thrust was applied.  An audible release occurred and was well-tolerated.    Manipulation was performed to the right femoral acetabular joint via grade 1 mobilization techniques and long axis traction technique.    Subjective:  The patient reported feeling much better in the bilateral lower thoracic/thoracolumbar region when compared to her initial chiropractic treatment visit.  The patient stated that she is not having the same intensity of tension or pain in the bilateral lower thoracic region (bra level) as the last chiropractic visit.  She also indicated having decreased symptoms in the bilateral lower back region.  She stated that she has not gone for a long walk and  therefore has not tested her right groin symptoms.  The patient reported that she has not been experiencing the discomfort into the upper abdominal/anterior lower rib region.  She feels that the lower thoracic/thoracolumbar cat stretches help.the patient denied experiencing pain in the back at the time of today's visit.  The patient described her right groin pain as a 2 on a 0-10 pain scale.   The patient also stated that she has some mild stiffness in her cervical region that she has relief with doing a superior trapezius stretch.  However she indicated that she rolls her neck forward she thought that was part of the stretching exercise.  She also indicated having intermittent sensation of discomfort into the bilateral temporal region.  She denied experiencing a headache in the temporal region.  She stated that the intermittent sensation of discomfort will be present for a brief period of time and then subside.    Objective:  Negative Derefield on the left. Active myofascial trigger points were present in the bilateral lower thoracic/thoracolumbar erector spinae, bilateral lumbar erector spinae, quadratus lumborum, and gluteus medius. Intersegmental motion was decreased in the thoracic spine including joint dysfunctions at T3-4, T7-8, and T10-11. Intersegmental motion was decreased in the lumbar spine including joint dysfunctions at L2-3 and L3-4. Intersegmental motion was decreased in the left sacroiliac joint.  The right femoral acetabular joint was restricted from external rotation.       I have used the Epic copy/forward function to compose this note.  I have reviewed my current note to ensure it reflects the current patient status, exam, assessment and plan.    Dictation voice to text software has been used in the creation of this document. Please consider this in light of any contextual or grammatical errors.

## 2024-09-23 ENCOUNTER — PROCEDURE VISIT (OUTPATIENT)
Age: 59
End: 2024-09-23
Payer: COMMERCIAL

## 2024-09-23 VITALS
OXYGEN SATURATION: 96 % | SYSTOLIC BLOOD PRESSURE: 110 MMHG | WEIGHT: 164 LBS | DIASTOLIC BLOOD PRESSURE: 62 MMHG | HEART RATE: 77 BPM | BODY MASS INDEX: 27.32 KG/M2 | HEIGHT: 65 IN

## 2024-09-23 DIAGNOSIS — M79.18 MYOFASCIAL PAIN: Primary | ICD-10-CM

## 2024-09-23 DIAGNOSIS — M99.02 SEGMENTAL DYSFUNCTION OF THORACIC REGION: ICD-10-CM

## 2024-09-23 DIAGNOSIS — M99.05 SOMATIC DYSFUNCTION OF HIP REGION: ICD-10-CM

## 2024-09-23 DIAGNOSIS — M99.03 SEGMENTAL DYSFUNCTION OF LUMBAR REGION: ICD-10-CM

## 2024-09-23 DIAGNOSIS — M53.3 SACROILIAC JOINT DYSFUNCTION: ICD-10-CM

## 2024-09-23 PROCEDURE — 98941 CHIROPRACT MANJ 3-4 REGIONS: CPT | Performed by: CHIROPRACTOR

## 2024-09-23 PROCEDURE — 98943 CHIROPRACT MANJ XTRSPINL 1/>: CPT | Performed by: CHIROPRACTOR

## 2024-09-23 NOTE — PATIENT INSTRUCTIONS
The patient was advised to apply ice as needed.   The patient was advised to continue with the home stretching exercises.  The patient was informed that she may experience additional soreness following the today's treatment visit. The need to continue with the stretching exercises and apply ice in 15-minute intervals was discussed with the patient.

## 2024-09-25 ENCOUNTER — PROCEDURE VISIT (OUTPATIENT)
Age: 59
End: 2024-09-25
Payer: COMMERCIAL

## 2024-09-25 VITALS
OXYGEN SATURATION: 97 % | DIASTOLIC BLOOD PRESSURE: 72 MMHG | BODY MASS INDEX: 27.32 KG/M2 | WEIGHT: 164 LBS | HEIGHT: 65 IN | HEART RATE: 82 BPM | SYSTOLIC BLOOD PRESSURE: 118 MMHG

## 2024-09-25 DIAGNOSIS — M99.05 SOMATIC DYSFUNCTION OF HIP REGION: ICD-10-CM

## 2024-09-25 DIAGNOSIS — M53.3 SACROILIAC JOINT DYSFUNCTION: ICD-10-CM

## 2024-09-25 DIAGNOSIS — M79.18 MYOFASCIAL PAIN: Primary | ICD-10-CM

## 2024-09-25 DIAGNOSIS — M99.03 SEGMENTAL DYSFUNCTION OF LUMBAR REGION: ICD-10-CM

## 2024-09-25 DIAGNOSIS — M99.02 SEGMENTAL DYSFUNCTION OF THORACIC REGION: ICD-10-CM

## 2024-09-25 PROCEDURE — 98943 CHIROPRACT MANJ XTRSPINL 1/>: CPT | Performed by: CHIROPRACTOR

## 2024-09-25 PROCEDURE — 98941 CHIROPRACT MANJ 3-4 REGIONS: CPT | Performed by: CHIROPRACTOR

## 2024-09-25 NOTE — PATIENT INSTRUCTIONS
The patient was advised to continue with the icing and stretching instructions.   The patient was taught a advanced gluteus medius/piriformis stretch for home use. The need to stretch to the point of tension only and not to the point of pain was emphasized. The patient was advised to do the stretch at 3 sets of 15 to 20 seconds several times per day.

## 2024-09-25 NOTE — PROGRESS NOTES
Assessment:  The patient is progressing as expected.  I was unaware that the patient had been experiencing symptoms in the right superior lateral or lateral gluteal region prior to today's visit.  It was my understanding that her symptoms were only noted in the right groin region.  Therefore The patient was taught an advanced gluteus medius/piriformis stretch for home use. The need to stretch to the point of tension only and not to the point of pain was emphasized. The patient was advised to do the stretch at 3 sets of 15 to 20 seconds several times per day.    1. Myofascial pain        2. Segmental dysfunction of thoracic region        3. Sacroiliac joint dysfunction        4. Segmental dysfunction of lumbar region        5. Somatic dysfunction of hip region          Plan:  Treatment today consisted of myofascial release via Graston Technique to the right anterior/lateral gluteus medius and tensor fascia natty GT 3 and GT 4 were used.  Myofascial release via ischemic compression myofascial release via ischemic compression was performed to the right gluteus tao and lateral piriformis.  Myofascial release via active release technique was performed to the right adductor angela and bilateral latissimus dorsi (prone, patient performed active movements for the latissimus dorsi).    Manipulation was performed to the left sacroiliac joint via Mari low force drop technique. Contact was made to the left ischial tuberosity and the right lateral aspect of the  apex of the sacrum. Manipulation was performed to the lumbar restrictions via manual lumbar flexion distraction technique. Manipulation was performed to the lower thoracic restrictions via grade 2 mobilization techniques and low force diversified maneuvers.  No high velocity thrust was applied.  An audible release occurred and was well-tolerated.    Manipulation was performed to the right femoral acetabular joint via grade 1 mobilization techniques and long axis  traction technique.    Subjective:  The patient reported feeling better in the bilateral lower thoracic/thoracolumbar region when compared to the last chiropractic treatment visit.  The patient reported that she is not aware of the lower thoracic/thoracolumbar symptoms constantly as she did prior to chiropractic care.  She stated that the intensity of the pain is much less and is also less frequent.  She stated she does not experience to the lower thoracic/thoracolumbar symptoms with prolonged standing such as while at work.  She is a hairdresser.  She stated that she is more aware of it if she is sitting for prolonged periods of time.  She also indicated having decreased symptoms in the bilateral lower back region.  She stated that she did not go for a long walk since the last chiropractic visit and therefore did not test the right groin symptoms.  The patient also indicated that she is aware of right superior lateral and lateral gluteal symptoms.  The patient stated that she had the right groin symptoms when she made her appointment for the initial chiropractic visit.  However she had also been aware of symptoms in the right superior lateral gluteal region and lateral gluteal region at that time.  She reported the right groin symptoms are better but she is still aware of the right superior lateral and lateral gluteal symptoms.  The patient described her right groin pain as a 0 on a 0-10 pain scale at the time of today's visit.  She is only aware of the right groin symptoms with prolonged walking. The patient described her current lower thoracic/thoracolumbar pain is a 0 on a 0-10 pain scale at the time of today's visit.  The patient stated that she did not notice an improvement since addressing the nerve entrapment in the cervical region on the last chiropractic visit.  She is still aware of an intermittent and brief sensation of discomfort into the bilateral temporal region.     Objective:  Negative Derefield on  the left. Active myofascial trigger points were present in the bilateral lower thoracic/thoracolumbar erector spinae, bilateral lumbar erector spinae, and quadratus lumborum.  Active myofascial trigger points and hypertonicity present in the right gluteus medius, right tensor fascia natty, right anterior/lateral gluteus medius, right lateral piriformis, and right lateral gluteus tao.  Hypertonicity is present in the bilateral latissimus dorsi with the right side being more hypertonic when compared to the left.  Intersegmental motion was decreased in the thoracic spine including joint dysfunctions at T3-4, T7-8, and T8-9. Intersegmental motion was decreased in the lumbar spine including joint dysfunctions at L2-3 and L3-4. Intersegmental motion was decreased in the left sacroiliac joint.  The right femoral acetabular joint was restricted from external rotation.  Possible entrapment at the junction of the bilateral splenius capitis and sternocleidomastoid.       I have used the Epic copy/forward function to compose this note.  I have reviewed my current note to ensure it reflects the current patient status, exam, assessment and plan.    Dictation voice to text software has been used in the creation of this document. Please consider this in light of any contextual or grammatical errors.

## 2024-09-30 ENCOUNTER — PROCEDURE VISIT (OUTPATIENT)
Age: 59
End: 2024-09-30
Payer: COMMERCIAL

## 2024-09-30 VITALS
BODY MASS INDEX: 27.32 KG/M2 | DIASTOLIC BLOOD PRESSURE: 70 MMHG | HEIGHT: 65 IN | SYSTOLIC BLOOD PRESSURE: 110 MMHG | WEIGHT: 164 LBS | HEART RATE: 89 BPM | OXYGEN SATURATION: 96 %

## 2024-09-30 DIAGNOSIS — M99.05 SOMATIC DYSFUNCTION OF HIP REGION: ICD-10-CM

## 2024-09-30 DIAGNOSIS — M79.18 MYOFASCIAL PAIN: Primary | ICD-10-CM

## 2024-09-30 DIAGNOSIS — M53.3 SACROILIAC JOINT DYSFUNCTION: ICD-10-CM

## 2024-09-30 DIAGNOSIS — M99.03 SEGMENTAL DYSFUNCTION OF LUMBAR REGION: ICD-10-CM

## 2024-09-30 DIAGNOSIS — M99.02 SEGMENTAL DYSFUNCTION OF THORACIC REGION: ICD-10-CM

## 2024-09-30 PROCEDURE — 98943 CHIROPRACT MANJ XTRSPINL 1/>: CPT | Performed by: CHIROPRACTOR

## 2024-09-30 PROCEDURE — 98941 CHIROPRACT MANJ 3-4 REGIONS: CPT | Performed by: CHIROPRACTOR

## 2024-09-30 NOTE — PATIENT INSTRUCTIONS
The patient was advised to apply ice as needed.   The patient was advised to continue with the home stretching exercises.

## 2024-09-30 NOTE — PROGRESS NOTES
Assessment:  The patient is progressing as expected.    1. Myofascial pain        2. Segmental dysfunction of thoracic region        3. Sacroiliac joint dysfunction        4. Segmental dysfunction of lumbar region        5. Somatic dysfunction of hip region          Plan:  Treatment today consisted of myofascial release via ischemic compression myofascial release via ischemic compression was performed to the bilateral lower thoracic erector spinae, right lateral gluteus medius, gluteus tao, and lateral piriformis.  Myofascial release via active release technique was performed to the bilateral latissimus dorsi (prone, patient performed active movements for the latissimus dorsi).    Manipulation was performed to the left sacroiliac joint via Mari low force drop technique. Contact was made to the left ischial tuberosity and the right lateral aspect of the  apex of the sacrum. Manipulation was performed to the lumbar restrictions via manual lumbar flexion distraction technique. Manipulation was performed to the lower thoracic restrictions via grade 2 mobilization techniques and low force diversified maneuvers.  No high velocity thrust was applied.  An audible release occurred and was well-tolerated.    Manipulation was performed to the right femoral acetabular joint via grade 1 mobilization techniques and long axis traction technique.    Subjective:  The patient reported feeling better in the bilateral lower thoracic/thoracolumbar region and right superior lateral gluteal/lateral gluteal symptoms when compared to the last chiropractic treatment visit.  The patient reported that she noticed a definite improvement in her right superior lateral gluteal/lateral gluteal symptoms when walking.  She stated that she did not notice the symptoms in the right gluteal region until further in her walk and indicated that the pain level was a 1 on a 0-10 pain scale.  The patient also reported that has less intense lower  thoracic/thoracolumbar symptoms and feels the ischemic compression to the muscles provides more relief than the Graston Technique. She has increased lower thoracic/thoracolumbar symptoms with sitting for prolonged periods of time.  She has decreased symptoms in the bilateral lower back region. The patient described her current lower thoracic/thoracolumbar pain is a 1 on a 0-10 pain scale at the time of today's visit.  The patient stated that she has decreased frequency of discomfort into the bilateral temporal region.  She described the pain level as a 1 on a 0-10 pain scale when present.    Objective:  Negative Derefield on the left. Active myofascial trigger points were present in the bilateral lower thoracic/thoracolumbar erector spinae, bilateral lumbar erector spinae, and quadratus lumborum.  Active myofascial trigger points and hypertonicity were present in the right gluteus medius, right tensor fascia natty, right anterior/lateral gluteus medius, right lateral piriformis, and right lateral gluteus tao.  Hypertonicity is present in the bilateral latissimus dorsi with the right side being more hypertonic when compared to the left.  Intersegmental motion was decreased in the thoracic spine including joint dysfunctions at T3-4, T7-8, and T8-9. Intersegmental motion was decreased in the lumbar spine including joint dysfunctions at L2-3 and L3-4. Intersegmental motion was decreased in the left sacroiliac joint.  The right femoral acetabular joint was restricted from adduction.  Possible nerve entrapment at the junction of the bilateral splenius capitis and sternocleidomastoid.       I have used the Epic copy/forward function to compose this note.  I have reviewed my current note to ensure it reflects the current patient status, exam, assessment and plan.    Dictation voice to text software has been used in the creation of this document. Please consider this in light of any contextual or grammatical errors.

## 2024-10-02 ENCOUNTER — PROCEDURE VISIT (OUTPATIENT)
Age: 59
End: 2024-10-02
Payer: COMMERCIAL

## 2024-10-02 VITALS
DIASTOLIC BLOOD PRESSURE: 64 MMHG | BODY MASS INDEX: 27.32 KG/M2 | SYSTOLIC BLOOD PRESSURE: 108 MMHG | OXYGEN SATURATION: 98 % | WEIGHT: 164 LBS | HEART RATE: 75 BPM | HEIGHT: 65 IN

## 2024-10-02 DIAGNOSIS — M53.3 SACROILIAC JOINT DYSFUNCTION: ICD-10-CM

## 2024-10-02 DIAGNOSIS — M79.18 MYOFASCIAL PAIN: Primary | ICD-10-CM

## 2024-10-02 DIAGNOSIS — M99.02 SEGMENTAL DYSFUNCTION OF THORACIC REGION: ICD-10-CM

## 2024-10-02 DIAGNOSIS — M99.05 SOMATIC DYSFUNCTION OF HIP REGION: ICD-10-CM

## 2024-10-02 DIAGNOSIS — M99.03 SEGMENTAL DYSFUNCTION OF LUMBAR REGION: ICD-10-CM

## 2024-10-02 PROCEDURE — 98943 CHIROPRACT MANJ XTRSPINL 1/>: CPT | Performed by: CHIROPRACTOR

## 2024-10-02 PROCEDURE — 98941 CHIROPRACT MANJ 3-4 REGIONS: CPT | Performed by: CHIROPRACTOR

## 2024-10-02 NOTE — PROGRESS NOTES
Assessment:  The patient is progressing as expected.    1. Myofascial pain        2. Segmental dysfunction of thoracic region        3. Sacroiliac joint dysfunction        4. Segmental dysfunction of lumbar region        5. Somatic dysfunction of hip region          Plan:  Treatment today consisted of myofascial release via Graston Technique to the right anterior/lateral gluteus medius and tensor fascia natty.  GT 3 and GT 4 were used.  Myofascial release via ischemic compression myofascial release via ischemic compression was performed to the bilateral lower thoracic/thoracolumbar erector spinae, right gluteus tao and lateral piriformis.     Manipulation was performed to the left sacroiliac joint via Mari low force drop technique. Contact was made to the left ischial tuberosity and the right lateral aspect of the  apex of the sacrum. Manipulation was performed to the lumbar restrictions via manual lumbar flexion distraction technique. Manipulation was performed to the lower thoracic restrictions via grade 2 mobilization techniques and low force diversified maneuvers.  No high velocity thrust was applied.  An audible release occurred and was well-tolerated.    Manipulation was performed to the right femoral acetabular joint via grade 1 mobilization techniques and long axis traction technique.    Subjective:  The patient reported feeling better in the bilateral lower thoracic/thoracolumbar region and right superior lateral gluteal/lateral gluteal symptoms when compared to the last chiropractic treatment visit.  She stated that she did not notice much symptoms in the lower thoracic region/thoracolumbar region yesterday.  Although she stated that she was working for a prolonged period of time and she typically does not have increased thoracolumbar/lower thoracic pain symptoms with prolonged standing such as at work.  The patient reported that she watched the debates last night and therefore was sitting for a  "prolonged period of time and was aware of some discomfort but much less overall.  The patient stated that she went for a walk this morning and was aware of the symptoms in the right gluteal/hip region but stated that she had less pronounced pain and was \"not limping.\"  She stated that the pain had been more pronounced prior to chiropractic care and her  would inform her that she was walking with a limp.  She stated this was not the case this morning and therefore she feels her hip symptoms are improving.  She also reported that the intensity of the gluteal/hip pain was not as intense as on earlier chiropractic visits. The patient reported that she noticed a definite improvement in her right superior lateral gluteal/lateral gluteal symptoms when walking.  She described the right gluteal/hip pain level as a 4 on a 0-10 pain scale with walking.  She described her right gluteal/hip pain as a 0 on a 0-10 pain scale at the time of today's visit.  The patient also reported that has less intense lower thoracic/thoracolumbar symptoms and feels the ischemic compression to the muscles provides more relief than the Graston Technique. She has increased lower thoracic/thoracolumbar symptoms with sitting for prolonged periods of time.  She has decreased symptoms in the bilateral lower back region. The patient described her current lower thoracic/thoracolumbar pain as a 0 on a 0-10 pain scale at the time of today's visit.    Objective:  Negative Derefield on the left. Active myofascial trigger points were present in the bilateral lower thoracic/thoracolumbar erector spinae, bilateral lumbar erector spinae, and quadratus lumborum.  Trigger point activity and hypertonicity are less noted in the bilateral lower thoracic/thoracolumbar erector spinae.  Active myofascial trigger points and hypertonicity were present in the right gluteus medius, right tensor fascia natty, right anterior/lateral gluteus medius, right lateral " piriformis, and right lateral gluteus tao.  Pressure to the trigger points in the right distal gluteus tao reproduced a positive muscle twitch response and some of the right gluteal pain described in today's chief complaint.  Intersegmental motion was decreased in the thoracic spine including joint dysfunctions at T6-7, T9-10, and T10-11. Intersegmental motion was decreased in the lumbar spine including joint dysfunctions at L2-3 and L3-4. Intersegmental motion was decreased in the left sacroiliac joint.  The right femoral acetabular joint was restricted from adduction.         I have used the Epic copy/forward function to compose this note.  I have reviewed my current note to ensure it reflects the current patient status, exam, assessment and plan.    Dictation voice to text software has been used in the creation of this document. Please consider this in light of any contextual or grammatical errors.

## 2024-10-09 ENCOUNTER — PROCEDURE VISIT (OUTPATIENT)
Age: 59
End: 2024-10-09
Payer: COMMERCIAL

## 2024-10-09 VITALS
DIASTOLIC BLOOD PRESSURE: 62 MMHG | WEIGHT: 164 LBS | OXYGEN SATURATION: 98 % | BODY MASS INDEX: 27.32 KG/M2 | HEART RATE: 72 BPM | SYSTOLIC BLOOD PRESSURE: 110 MMHG | HEIGHT: 65 IN

## 2024-10-09 DIAGNOSIS — M99.05 SOMATIC DYSFUNCTION OF HIP REGION: ICD-10-CM

## 2024-10-09 DIAGNOSIS — M79.18 MYOFASCIAL PAIN: Primary | ICD-10-CM

## 2024-10-09 DIAGNOSIS — M99.03 SEGMENTAL DYSFUNCTION OF LUMBAR REGION: ICD-10-CM

## 2024-10-09 DIAGNOSIS — M99.02 SEGMENTAL DYSFUNCTION OF THORACIC REGION: ICD-10-CM

## 2024-10-09 DIAGNOSIS — M53.3 SACROILIAC JOINT DYSFUNCTION: ICD-10-CM

## 2024-10-09 PROCEDURE — 98941 CHIROPRACT MANJ 3-4 REGIONS: CPT | Performed by: CHIROPRACTOR

## 2024-10-09 PROCEDURE — 98943 CHIROPRACT MANJ XTRSPINL 1/>: CPT | Performed by: CHIROPRACTOR

## 2024-10-09 NOTE — PROGRESS NOTES
Assessment:  This is an exacerbation of a current condition.    1. Myofascial pain        2. Segmental dysfunction of thoracic region        3. Sacroiliac joint dysfunction        4. Segmental dysfunction of lumbar region        5. Somatic dysfunction of hip region          Plan:  Treatment today consisted of myofascial release via Graston Technique to the bilateral lower thoracic/thoracolumbar and upper lumbar erector spinae, right anterior/lateral gluteus medius, and tensor fascia natty.  GT 3 and GT 4 were used.  Myofascial release via ischemic compression myofascial release via ischemic compression was performed to the right gluteus tao and lateral piriformis.     Manipulation was performed to the left sacroiliac joint via Mari low force drop technique. Contact was made to the left ischial tuberosity and the right lateral aspect of the  apex of the sacrum. Manipulation was performed to the lumbar restrictions via manual lumbar flexion distraction technique. Manipulation was performed to the lower thoracic restrictions via grade 2 mobilization techniques and low force diversified maneuvers.  No high velocity thrust was applied.  An audible release occurred and was well-tolerated.    Manipulation was performed to the right femoral acetabular joint via grade 1 mobilization techniques and long axis traction technique.    Discussion:  Relief was reported with manipulation to the lower thoracic restrictions.    Subjective:  The patient reported that she went to [place] this weekend and had more pronounced pain in the bilateral lower thoracic/thoracolumbar region and right lateral gluteal region with the prolonged drive (2 and a half hours).  The patient stated that she was then walking for prolonged period of time and had more pronounced pain in the bilateral lower thoracic/thoracolumbar region as well as her right lateral gluteal region.  She indicated that when she was in the [facility] she was sitting on  stools without back support and feels this aggravated her bilateral lower thoracic/thoracolumbar region. She described the right gluteal/hip pain level and bilateral lower thoracic/thoracolumbar pain level as a 5 on a 0-10 pain scale with prolonged walking and prolonged sitting.  She described her ight gluteal/hip pain level and bilateral lower thoracic/thoracolumbar pain level as a 1 on a 0-10 pain scale at the time of today's visit.   She typically has increased lower thoracic/thoracolumbar symptoms with sitting for prolonged periods of time.  She had some discomfort/pain in the left lower back region upon waking this morning but it subsided with stretching and moving.     Objective:  Negative Derefield on the left. Active myofascial trigger points were present in the bilateral lower thoracic/thoracolumbar erector spinae, bilateral lumbar erector spinae, and quadratus lumborum.  Trigger point activity and hypertonicity are more pronounced in the bilateral thoracolumbar/upper lumbar erector spinae when compared to the bilateral lower thoracic erector spinae.  Active myofascial trigger points and hypertonicity were present in the right gluteus medius, right tensor fascia natty, right anterior/lateral gluteus medius, right lateral piriformis, and right lateral gluteus tao.  Pressure to the trigger points in the right lateral piriformis reproduced most of the pain/symptoms described in today's chief complaint.  Intersegmental motion was decreased in the thoracic spine including joint dysfunctions at T6-7, T9-10, T10-11, and T11-12. Intersegmental motion was decreased in the lumbar spine including joint dysfunctions at L2-3 and L5-S1. Intersegmental motion was decreased in the left sacroiliac joint.  The right femoral acetabular joint was restricted from adduction.         I have used the Epic copy/forward function to compose this note.  I have reviewed my current note to ensure it reflects the current patient  status, exam, assessment and plan.    Dictation voice to text software has been used in the creation of this document. Please consider this in light of any contextual or grammatical errors.

## 2024-10-11 ENCOUNTER — PROCEDURE VISIT (OUTPATIENT)
Age: 59
End: 2024-10-11
Payer: COMMERCIAL

## 2024-10-11 VITALS
DIASTOLIC BLOOD PRESSURE: 72 MMHG | HEART RATE: 82 BPM | WEIGHT: 164 LBS | OXYGEN SATURATION: 97 % | SYSTOLIC BLOOD PRESSURE: 116 MMHG | HEIGHT: 65 IN | BODY MASS INDEX: 27.32 KG/M2

## 2024-10-11 DIAGNOSIS — M53.3 SACROILIAC JOINT DYSFUNCTION: ICD-10-CM

## 2024-10-11 DIAGNOSIS — M99.05 SOMATIC DYSFUNCTION OF HIP REGION: ICD-10-CM

## 2024-10-11 DIAGNOSIS — M99.02 SEGMENTAL DYSFUNCTION OF THORACIC REGION: ICD-10-CM

## 2024-10-11 DIAGNOSIS — M79.18 MYOFASCIAL PAIN: Primary | ICD-10-CM

## 2024-10-11 DIAGNOSIS — M99.03 SEGMENTAL DYSFUNCTION OF LUMBAR REGION: ICD-10-CM

## 2024-10-11 PROCEDURE — 98943 CHIROPRACT MANJ XTRSPINL 1/>: CPT | Performed by: CHIROPRACTOR

## 2024-10-11 PROCEDURE — 98941 CHIROPRACT MANJ 3-4 REGIONS: CPT | Performed by: CHIROPRACTOR

## 2024-10-11 NOTE — PROGRESS NOTES
Assessment:  The patient is progressing better since her latest estimation.  I began addressing the entrapment site of the right sciatic nerve at the right piriformis to determine if this provides her relief of right gluteal symptoms with walking.    1. Myofascial pain        2. Segmental dysfunction of thoracic region        3. Sacroiliac joint dysfunction        4. Segmental dysfunction of lumbar region        5. Somatic dysfunction of hip region          Plan:  Treatment today consisted of myofascial release via ischemic compression to the bilateral lower thoracic/thoracolumbar erector spinae (side-lying), right gluteus tao, and lateral piriformis.     Manipulation was performed to the left sacroiliac joint via Mari low force drop technique. Contact was made to the left ischial tuberosity and the right lateral aspect of the  apex of the sacrum. Manipulation was performed to the lumbar restrictions via manual lumbar flexion distraction technique. Manipulation was performed to the lower thoracic restrictions via grade 2 mobilization techniques and low force diversified maneuvers.  No high velocity thrust was applied.  An audible release occurred and was well-tolerated.    Manipulation was performed to the right femoral acetabular joint via grade 1 mobilization techniques and long axis traction technique.    Subjective:  The patient reported feeling much better in the lower thoracic region when compared to last chiropractic visit.  She stated that she typically has more pronounced soreness or stiffness in the lower thoracic region when sitting in the evening after a full day of work.  She stated that she did not experience that lower thoracic pain/soreness yesterday or in the evening.  The patient stated that she was aware of discomfort in her right mid gluteal region as well as her right groin region when walking.  She stated she feels as though her right hip is improving but is still painful with walking.   She described the right gluteal/hip pain level as a 2 on a 0-10 pain scale.  She described the bilateral lower thoracic/thoracolumbar pain level as a 1 on a 0-10 pain scale at the time of today's visit.   She typically has increased lower thoracic/thoracolumbar symptoms with sitting for prolonged periods of time.  She had some discomfort/pain in the left lower back region upon waking this morning but it subsided with stretching and moving.   The patient stated that her head symptoms that she had been experiencing have resolved and are no longer present.  She described her neck/head symptoms as a 0 on a 0-10 pain scale.    Objective:  Negative Derefield on the left. Active myofascial trigger points were present in the bilateral lower thoracic/thoracolumbar erector spinae, bilateral lumbar erector spinae, and quadratus lumborum.  Trigger point activity and hypertonicity are less pronounced in the bilateral thoracolumbar erector spinae when compared to the last chiropractic visit.  Less active myofascial trigger points and less hypertonicity were present in the right gluteus medius, right tensor fascia natty, right anterior/lateral gluteus medius, right lateral piriformis, and right lateral gluteus tao when compared to the last chiropractic visit.  Possible entrapment of the sciatic nerve at the right piriformis.  Intersegmental motion was decreased in the thoracic spine including joint dysfunctions at T6-7, T7-8, and T10-11. Intersegmental motion was decreased in the lumbar spine including joint dysfunctions at L2-3 and L3-4. Intersegmental motion was decreased in the left sacroiliac joint.  The right femoral acetabular joint was restricted from adduction.         I have used the Epic copy/forward function to compose this note.  I have reviewed my current note to ensure it reflects the current patient status, exam, assessment and plan.    Dictation voice to text software has been used in the creation of this  document. Please consider this in light of any contextual or grammatical errors.

## 2024-10-14 ENCOUNTER — PROCEDURE VISIT (OUTPATIENT)
Age: 59
End: 2024-10-14
Payer: COMMERCIAL

## 2024-10-14 ENCOUNTER — TELEPHONE (OUTPATIENT)
Age: 59
End: 2024-10-14

## 2024-10-14 ENCOUNTER — PREP FOR PROCEDURE (OUTPATIENT)
Age: 59
End: 2024-10-14

## 2024-10-14 VITALS
WEIGHT: 164 LBS | SYSTOLIC BLOOD PRESSURE: 111 MMHG | HEIGHT: 65 IN | BODY MASS INDEX: 27.32 KG/M2 | HEART RATE: 93 BPM | DIASTOLIC BLOOD PRESSURE: 73 MMHG

## 2024-10-14 DIAGNOSIS — M99.05 SOMATIC DYSFUNCTION OF HIP REGION: ICD-10-CM

## 2024-10-14 DIAGNOSIS — M99.03 SEGMENTAL DYSFUNCTION OF LUMBAR REGION: ICD-10-CM

## 2024-10-14 DIAGNOSIS — M79.18 MYOFASCIAL PAIN: Primary | ICD-10-CM

## 2024-10-14 DIAGNOSIS — M53.3 SACROILIAC JOINT DYSFUNCTION: ICD-10-CM

## 2024-10-14 DIAGNOSIS — Z12.11 SCREENING FOR COLON CANCER: Primary | ICD-10-CM

## 2024-10-14 DIAGNOSIS — M99.02 SEGMENTAL DYSFUNCTION OF THORACIC REGION: ICD-10-CM

## 2024-10-14 PROCEDURE — 98941 CHIROPRACT MANJ 3-4 REGIONS: CPT | Performed by: CHIROPRACTOR

## 2024-10-14 PROCEDURE — 99213 OFFICE O/P EST LOW 20 MIN: CPT | Performed by: CHIROPRACTOR

## 2024-10-14 PROCEDURE — 98943 CHIROPRACT MANJ XTRSPINL 1/>: CPT | Performed by: CHIROPRACTOR

## 2024-10-14 NOTE — TELEPHONE ENCOUNTER
Scheduled date of colonoscopy (as of today):  12/11/24    Physician performing colonoscopy: Dr. Chinchilla    Location of colonoscopy:  [facility]    Bowel prep reviewed with patient: Ayan

## 2024-10-14 NOTE — PROGRESS NOTES
1. Myofascial pain        2. Segmental dysfunction of lumbar region        3. Sacroiliac joint dysfunction        4. Segmental dysfunction of thoracic region        5. Somatic dysfunction of hip region            Assessment/Plan:    Review of office note:    The patient's September 16, 2024 initial chiropractic evaluation office note and back index were reviewed today to determine progress since the chiropractic initial evaluation.    Medical Decision Making and Treatment Plan:  The patient described her lower thoracic/thoracolumbar symptoms as 90% improved overall.  She described her right gluteal and hip symptoms as 50% improved overall.  She stated that she is able to walk a greater distance (1 mile) when compared to the initial chiropractic evaluation he has pain with walking approximately 50 feet).  The patient had relief of right mid thoracic symptoms with addressing the entrapment site of the sciatic nerve at the right piriformis on the last chiropractic visit. Therefore we will continue to address that entrapment site if needed.    Objectively, the patient has increased right lumbar lateral flexion maneuvers and a mild increase in left lumbar lateral flexion but a mild decrease in lumbar extension when compared to her initial chiropractic evaluation.  Lumbar extension is still within normal limits as is bilateral lumbar lateral flexion maneuvers.  Lumbar flexion is the only limited range of motion.  Therefore the patient was prescribed hamstring stretching exercises for home use.  her back index increased from 12 to 16 indicating decreased function since her initial chiropractic evaluation.  The patient was question whether she feels she is more limited as her back index score indicates.  Obviously she stated that she is able to tolerate walking better and feels as though she has less pain/stiffness in her lower thoracic/thoracolumbar region at night when sitting as sitting is less uncomfortable since  beginning chiropractic care.    The patient will be treated with conservative chiropractic care including myofascial release, joint mobilization, and a home stretching and icing program.    The patient will be seen 2 times per week for 2 to 4 weeks and 1 time per week for 2-3 weeks and then reassess for progress.    Patient Instructions:    The patient was advised to continue with the icing and stretching instructions.   The patient was taught assisted and unassisted hamstring stretches for home use. The need to stretch to the point of tension only and not to the point of pain was emphasized. The patient was advised to do the stretch at 3 sets of 15 to 20 seconds several times per day.    Goals:    JMLGOALS: Improve patient's ability to tolerate prolonged walking, Improve patient's ability to tolerate prolonged sitting, and improve patient's ability to tolerate prolonged driving/being a passenger in a car and improve trunk flexion    At least 20 minutes of time was spent with the patient during the consultation including the patient's history/current complaints, physical exam, reviewing the newly prescribed hamstring stretches, reviewing the diagnostic report/office notes, reviewing home instruction/reducing risk factors with the patient, reviewing my findings with the patient, and discussing the treatment/treatment plan with the patient.      This is a separate identifiable portion of today's visit from the E and M code billed.    Treatment today consisted of myofascial release via Graston Technique to the bilateral lower thoracic/thoracolumbar and upper lumbar erector spinae, right anterior/lateral gluteus medius, and tensor fascia natty.  GT 3 and GT 4 were used. Myofascial release via ischemic compression to the right gluteus tao, and lateral piriformis.  Myofascial release via long tract nerve release techniques/active release technique was performed to the entrapment site of the sciatic nerve at the right  piriformis (patient performed active trunk movements).    Manipulation was performed to the left sacroiliac joint via Mari low force drop technique. Contact was made to the left ischial tuberosity and the right lateral aspect of the  apex of the sacrum. Manipulation was performed to the lumbar restrictions via manual lumbar flexion distraction technique. Manipulation was performed to the lower thoracic restrictions via grade 2 mobilization techniques and low force diversified maneuvers.  No high velocity thrust was applied.  An audible release occurred and was well-tolerated.    Manipulation was performed to the right femoral acetabular joint via grade 1 mobilization techniques and long axis traction technique.    Subjective:      Abigail Garcia is a 59 y.o. female who reported feeling better when compared to the last chiropractic visit.  She stated she is not experiencing the symptoms in the right gluteal region.  Instead she is aware of symptoms in the right superior lateral gluteal region.  She stated that she walked 1 mile this morning.  She reported that she was aware of discomfort in the right superior lateral gluteal region after the first half mile to three quarters of a mile but further than she had been able to walk.  She stated that prior to chiropractic care she would be aware of the pain in the right groin region, right gluteal region, and right superior lateral gluteal region after approximately 50 feet of walking.  Overall she described her right hip symptoms as 50% improved.  The patient denied experiencing pain or paresthesias in the lower extremities.  The patient reported that the bilateral thoracolumbar region/lower thoracic region is doing better than the last chiropractic visit.  She stated that she noticed soreness in the bilateral lower thoracic region Friday night after the chiropractic treatment.  However she stated that it then subsided and she felt an overall significant improvement.   "She stated that the bilateral thoracic/thoracolumbar region was then feeling pretty good.  Overall she described the thoracolumbar/lower thoracic pain as 90% improved.  She stated she has increased symptoms in the lower thoracic/thoracolumbar region with being a passenger in a car or leaning to the left while watching television.  She also reported that she may notice worsening symptoms in the bilateral thoracic region when she is tense while driving.  She reported having decreased bilateral lower thoracic/thoracolumbar symptoms with doing a lower thoracic/thoracolumbar cat stretch.  She describes her pain level as a 1 on a 0-10 pain scale today.       Objective:     /73   Pulse 93   Ht 5' 5\" (1.651 m)   Wt 74.4 kg (164 lb)   LMP  (LMP Unknown) Comment: LMP 2009  BMI 27.29 kg/m²     Appearance: Well developed, well nourished, and in no acute distress    Alert and oriented x 3    Mood/Affect: calm and pleasant    Gait/Posture:    Gait: Normal    Antalgic posture: None    Lordosis: Lumbar- normal    Kyphosis: Thoracic- normal    Lower extremity reflexes:    Deep tendon reflexes were normal and symmetrical in the bilateral lower extremities.    Lower Extremity Muscle Testing:    Muscle testing of the bilateral lower extremities was normal and graded +5/5.    Sensory:    Sensation to light touch was normal and symmetrical in the bilateral lower extremities.    Babinski was negative bilaterally.    Lumbar Orthopedic Tests:    Seated Straight Leg Raise was negative  bilaterally.    Supine Straight Leg Raise was negative  on the Right.    Supine Straight Leg Raise was negative  on the Left. Mild inflexibility is noted in the bilateral hamstrings.    Cj Test was negative  bilaterally.    Hip evaluation:    Passive internal rotation of the Right/left: right femoroacetabular joint was negative for limited range of motion reproduction of pain.    Passive external rotation of the Right/left: right " femoroacetabular joint was positive for limited range of motion but no pain was present.    Motion Palpation:    The Right/left: right femoroacetabular joint was restricted from external rotation.    Active Lumbar Ranges of Motion:    Flexion: Allowed the patient to touch the distal 14 of the tibia. Tension is noted in the upper abdominal region with forward flexion.    Extension: 28 degrees    Right lateral flexion: 28 degrees    Left Lateral flexion: 32 degrees    Palpation:    Negative Derefield on the left. Active myofascial trigger points were present in the bilateral lower thoracic/thoracolumbar erector spinae, bilateral lumbar erector spinae, and quadratus lumborum.  Active myofascial trigger points and hypertonicity were present in the right gluteus medius, right tensor fascia natty, right anterior/lateral gluteus medius, right lateral piriformis, and right lateral gluteus tao when compared to the last chiropractic visit.  Intersegmental motion was decreased in the thoracic spine including joint dysfunctions at T6-7, T7-8, and T10-11. Intersegmental motion was decreased in the lumbar spine including joint dysfunctions at L2-3 and L3-4. Intersegmental motion was decreased in the left sacroiliac joint.  The right femoral acetabular joint was restricted from adduction due to discomfort in the right groin.           I have used the Epic copy/forward function to compose this note. I have reviewed my current note to ensure it reflects the current patient status, exam, assessment and plan.    Dictation voice to text software has been used in the creation of this document. Please consider this in light of any contextual or grammatical errors.

## 2024-10-14 NOTE — PATIENT INSTRUCTIONS
The patient was advised to continue with the icing and stretching instructions.  The patient was taught assisted/unassisted hamstring stretch for home use. The need to stretch to the point of tension only and not to the point of pain was emphasized. The patient was advised to do the stretch at 3 sets of 15 to 20 seconds several times per day.

## 2024-10-14 NOTE — TELEPHONE ENCOUNTER
10/14/24  Screened by: Nyla Sauceda    Referring Provider TARAS Jeffrey    Pre- Screening:     There is no height or weight on file to calculate BMI.  Has patient been referred for a routine screening Colonoscopy? yes  Is the patient between 45-75 years old? yes      Previous Colonoscopy yes   If yes:    Date: 2014    Facility:     Reason:         Does the patient want to see a Gastroenterologist prior to their procedure OR are they having any GI symptoms? no    Has the patient been hospitalized or had abdominal surgery in the past 6 months? no    Does the patient use supplemental oxygen? no    Does the patient take Coumadin, Lovenox, Plavix, Elliquis, Xarelto, or other blood thinning medication? no    Has the patient had a stroke, cardiac event, or stent placed in the past year? no        If patient is between 45yrs - 49yrs, please advise patient that we will have to confirm benefits & coverage with their insurance company for a routine screening colonoscopy.

## 2024-10-16 ENCOUNTER — PROCEDURE VISIT (OUTPATIENT)
Age: 59
End: 2024-10-16
Payer: COMMERCIAL

## 2024-10-16 VITALS
SYSTOLIC BLOOD PRESSURE: 120 MMHG | WEIGHT: 164 LBS | BODY MASS INDEX: 27.32 KG/M2 | OXYGEN SATURATION: 98 % | DIASTOLIC BLOOD PRESSURE: 72 MMHG | HEIGHT: 65 IN | HEART RATE: 83 BPM

## 2024-10-16 DIAGNOSIS — M99.03 SEGMENTAL DYSFUNCTION OF LUMBAR REGION: ICD-10-CM

## 2024-10-16 DIAGNOSIS — M99.05 SOMATIC DYSFUNCTION OF HIP REGION: ICD-10-CM

## 2024-10-16 DIAGNOSIS — M79.18 MYOFASCIAL PAIN: Primary | ICD-10-CM

## 2024-10-16 DIAGNOSIS — M53.3 SACROILIAC JOINT DYSFUNCTION: ICD-10-CM

## 2024-10-16 DIAGNOSIS — M99.02 SEGMENTAL DYSFUNCTION OF THORACIC REGION: ICD-10-CM

## 2024-10-16 PROCEDURE — 98943 CHIROPRACT MANJ XTRSPINL 1/>: CPT | Performed by: CHIROPRACTOR

## 2024-10-16 PROCEDURE — 98941 CHIROPRACT MANJ 3-4 REGIONS: CPT | Performed by: CHIROPRACTOR

## 2024-10-16 NOTE — PROGRESS NOTES
Assessment:  The patient is progressing as expected.    1. Myofascial pain        2. Segmental dysfunction of lumbar region        3. Sacroiliac joint dysfunction        4. Segmental dysfunction of thoracic region        5. Somatic dysfunction of hip region          Plan:  Treatment today consisted of myofascial release via ischemic compression to the bilateral lower thoracic/thoracolumbar and upper lumbar erector spinae (side-lying), right lateral gluteus medius, and right lateral piriformis (patient performed active hip flexion movements). Myofascial release via long tract nerve release techniques/active release technique was performed to the entrapment site of the sciatic nerve at the right piriformis (patient performed active trunk movements).    Manipulation was performed to the left sacroiliac joint via Mari low force drop technique. Contact was made to the left ischial tuberosity and the right lateral aspect of the  apex of the sacrum. Manipulation was performed to the lumbar restrictions via manual lumbar flexion distraction technique. Manipulation was performed to the lower thoracic restrictions via grade 2 mobilization techniques and low force diversified maneuvers.  No high velocity thrust was applied.  An audible release occurred and was well-tolerated.    Manipulation was performed to the right femoral acetabular joint via grade 1 mobilization techniques and long axis traction technique.    Subjective:  The patient reported feeling better when compared to the last chiropractic visit.  However she stated that she did not walk over the past 2 days and therefore has not tested her right hip region.  She stated the bilateral lower thoracic/thoracolumbar symptoms are feeling better.  The patient has intermittent pain/symptoms in the right superior lateral gluteal region with prolonged walking.  She has increased symptoms in the lower thoracic/thoracolumbar region with being a passenger in a car or  leaning to the left while watching television.  She has increased symptoms in the bilateral thoracic region when she is tense while driving.  She reported having decreased bilateral lower thoracic/thoracolumbar symptoms with doing a lower thoracic/thoracolumbar cat stretch.      Objective:  Negative Derefield on the left. Active myofascial trigger points were present in the bilateral lower thoracic/thoracolumbar erector spinae, bilateral lumbar erector spinae, and quadratus lumborum.  Active myofascial trigger points and hypertonicity were present in the right gluteus medius, right tensor fascia natty, right anterior/lateral gluteus medius, right lateral piriformis, and right lateral gluteus tao when compared to the last chiropractic visit.  Intersegmental motion was decreased in the thoracic spine including joint dysfunctions at T6-7, T7-8, and T10-11. Intersegmental motion was decreased in the lumbar spine including joint dysfunctions at L2-3 and L3-4. Intersegmental motion was decreased in the left sacroiliac joint.  The right femoral acetabular joint was restricted from adduction due to discomfort in the right groin.           I have used the Epic copy/forward function to compose this note. I have reviewed my current note to ensure it reflects the current patient status, exam, assessment and plan.    Dictation voice to text software has been used in the creation of this document. Please consider this in light of any contextual or grammatical errors.

## 2024-10-21 ENCOUNTER — PROCEDURE VISIT (OUTPATIENT)
Age: 59
End: 2024-10-21
Payer: COMMERCIAL

## 2024-10-21 VITALS
HEIGHT: 65 IN | HEART RATE: 83 BPM | OXYGEN SATURATION: 98 % | DIASTOLIC BLOOD PRESSURE: 60 MMHG | WEIGHT: 164 LBS | SYSTOLIC BLOOD PRESSURE: 102 MMHG | BODY MASS INDEX: 27.32 KG/M2

## 2024-10-21 DIAGNOSIS — M53.3 SACROILIAC JOINT DYSFUNCTION: ICD-10-CM

## 2024-10-21 DIAGNOSIS — M79.18 MYOFASCIAL PAIN: Primary | ICD-10-CM

## 2024-10-21 DIAGNOSIS — M99.02 SEGMENTAL DYSFUNCTION OF THORACIC REGION: ICD-10-CM

## 2024-10-21 DIAGNOSIS — M99.05 SOMATIC DYSFUNCTION OF HIP REGION: ICD-10-CM

## 2024-10-21 DIAGNOSIS — M99.03 SEGMENTAL DYSFUNCTION OF LUMBAR REGION: ICD-10-CM

## 2024-10-21 PROCEDURE — 98941 CHIROPRACT MANJ 3-4 REGIONS: CPT | Performed by: CHIROPRACTOR

## 2024-10-21 PROCEDURE — 98943 CHIROPRACT MANJ XTRSPINL 1/>: CPT | Performed by: CHIROPRACTOR

## 2024-10-21 NOTE — PROGRESS NOTES
Assessment:  This is an exacerbation of a current condition.  The patient was advised to avoid dealing with the eye doctor stretch with her right lower extremity off of the bed as she may be stressing the right lower back and left thoracolumbar regions.    1. Myofascial pain        2. Segmental dysfunction of lumbar region        3. Sacroiliac joint dysfunction        4. Segmental dysfunction of thoracic region        5. Somatic dysfunction of hip region          Plan:  Treatment today consisted of myofascial release via Graston Technique to the bilateral lower thoracic/thoracolumbar and upper lumbar erector spinae, right lower lumbar erector spinae, right distal quadratus lumborum, and superior portion of the right gluteus medius.  GT 3 was used. Myofascial release via ischemic compression to the right gluteus tao and lateral piriformis.  Myofascial release via long tract nerve release techniques/active release technique was performed to the entrapment site of the sciatic nerve at the right piriformis (patient performed active trunk movements).  Myofascial release via active release technique was performed to the right adductor angela.    Manipulation was performed to the right sacroiliac joint via Mari low force drop technique. Manipulation was performed to the lumbar restrictions via manual lumbar flexion distraction technique. Manipulation was performed to the lower thoracic restrictions via grade 2 mobilization techniques and low force diversified maneuvers.  No high velocity thrust was applied.  An audible release occurred and was well-tolerated.    Manipulation was performed to the right femoral acetabular joint via grade 1 mobilization techniques and long axis traction technique.    Subjective:  The patient reported feeling worse in the bilateral lower thoracic/thoracolumbar region and right groin region when compared to the last chiropractic visit.  She also indicated having pain in the right lower  back region.  She stated that she was trying to do the adductor stretch that I do when I do active release technique by hanging her leg off of the bed and an external rotation of the hip type movement.  She stated she did this because she noticed that she had more limited range of motion and external rotation with placing her right ankle on her left knee when compared to the other lower extremity/hip.  She is not sure if this was the source of her symptoms.  Otherwise she did not do anything out of the ordinary except for lift up a 22-pound baby when she visited a friend.  She indicated having pain in her right groin region, left thoracolumbar region, and right lower back region.  She also indicated having radiation into the bilateral lower rib region.  The patient stated the right mid gluteal region is feeling well and not more pronounced with walking.  The patient says she has not been leaning to the left while watching television.  She has increased symptoms in the lower thoracic/thoracolumbar region with being a passenger in a car..  She has increased symptoms in the bilateral thoracic region when she is tense while driving.  She reported having decreased bilateral lower thoracic/thoracolumbar symptoms with doing a lower thoracic/thoracolumbar cat stretch.  She described the pain level as a 7 on a 0-10 pain scale today.    Objective:  Positive Derefield on the right. Active myofascial trigger points were present in the bilateral lower thoracic/thoracolumbar erector spinae, right lumbar erector spinae, and quadratus lumborum.  Active myofascial trigger points and hypertonicity were present in the right gluteus medius, right lateral piriformis, and right lateral gluteus tao.  Intersegmental motion was decreased in the thoracic spine including joint dysfunctions at T6-7, T7-8, T10-11, and T11-12. Intersegmental motion was decreased in the lumbar spine including joint dysfunctions at L2-3 and L5-S1.  Intersegmental motion was decreased in the right sacroiliac joint.  The right femoral acetabular joint was restricted from adduction due to discomfort in the right groin.  Hypertonicity is present in the right adductor angela and more limited when compared to recent visits.         I have used the Epic copy/forward function to compose this note. I have reviewed my current note to ensure it reflects the current patient status, exam, assessment and plan.    Dictation voice to text software has been used in the creation of this document. Please consider this in light of any contextual or grammatical errors.

## 2024-10-21 NOTE — PATIENT INSTRUCTIONS
Avoid trying to stretch the adductors  The patient was advised to continue with the icing and stretching instructions.   The patient was informed that she may experience additional soreness following the today's treatment visit. The need to continue with the stretching exercises and apply ice in 15-minute intervals was discussed with the patient.

## 2024-10-23 ENCOUNTER — PROCEDURE VISIT (OUTPATIENT)
Age: 59
End: 2024-10-23
Payer: COMMERCIAL

## 2024-10-23 VITALS
BODY MASS INDEX: 27.32 KG/M2 | DIASTOLIC BLOOD PRESSURE: 62 MMHG | HEART RATE: 88 BPM | WEIGHT: 164 LBS | OXYGEN SATURATION: 96 % | SYSTOLIC BLOOD PRESSURE: 98 MMHG | HEIGHT: 65 IN

## 2024-10-23 DIAGNOSIS — M99.03 SEGMENTAL DYSFUNCTION OF LUMBAR REGION: ICD-10-CM

## 2024-10-23 DIAGNOSIS — M99.02 SEGMENTAL DYSFUNCTION OF THORACIC REGION: ICD-10-CM

## 2024-10-23 DIAGNOSIS — M99.05 SOMATIC DYSFUNCTION OF HIP REGION: ICD-10-CM

## 2024-10-23 DIAGNOSIS — M79.18 MYOFASCIAL PAIN: Primary | ICD-10-CM

## 2024-10-23 DIAGNOSIS — M53.3 SACROILIAC JOINT DYSFUNCTION: ICD-10-CM

## 2024-10-23 PROCEDURE — 98943 CHIROPRACT MANJ XTRSPINL 1/>: CPT | Performed by: CHIROPRACTOR

## 2024-10-23 PROCEDURE — 98941 CHIROPRACT MANJ 3-4 REGIONS: CPT | Performed by: CHIROPRACTOR

## 2024-10-23 NOTE — PROGRESS NOTES
Assessment:  This is an exacerbation of a current condition.  The patient was advised to avoid dealing with the eye doctor stretch with her right lower extremity off of the bed as she may be stressing the right lower back and left thoracolumbar regions.    1. Myofascial pain        2. Segmental dysfunction of lumbar region        3. Sacroiliac joint dysfunction        4. Segmental dysfunction of thoracic region        5. Somatic dysfunction of hip region          Plan:  Treatment today consisted of myofascial release via ischemic compression to the bilateral lower thoracic/thoracolumbar lumbar erector spinae, right lower lumbar erector spinae, and right distal quadratus lumborum (side-lying). Myofascial release via long tract nerve release techniques/active release technique was performed to the entrapment site of the sciatic nerve at the right piriformis (patient performed active trunk movements).  Myofascial release via active release technique was performed to the right adductor angela.    Manipulation was performed to the right sacroiliac joint via Mari low force drop technique. Manipulation was performed to the lumbar restrictions via manual lumbar flexion distraction technique. Manipulation was performed to the lower thoracic restrictions via grade 2 mobilization techniques and low force diversified maneuvers.  No high velocity thrust was applied.  An audible release occurred and was well-tolerated.    Manipulation was performed to the right femoral acetabular joint via grade 1 mobilization techniques and long axis traction technique.    Subjective:  The patient reported feeling much better in the bilateral lower thoracic/thoracolumbar region and right groin region when compared to the last chiropractic visit.  She also reported feeling much better in the right lower back region since last chiropractic visit.  The patient stated that she was aware of mild discomfort into the lower rib region yesterday.   Yesterday, she was also aware of some soreness in the right lower back region as a result of the Graston Technique performed on Monday.  However today she reported that the right lower back region is feeling much better with only mild symptoms.  She reported that the lower thoracic/thoracolumbar region was feeling much better yesterday and today as well.  She stated that she went for a walk yesterday and was aware of mild right lower back soreness afterwards but denied experiencing pain into the right groin or gluteal region.  She typically has increased symptoms in the lower thoracic/thoracolumbar region with being a passenger in a car and when she is tense while driving.  She has decreased bilateral lower thoracic/thoracolumbar symptoms with doing the lower thoracic/thoracolumbar cat stretch.  She described the pain level as a 1-2 on a 0-10 pain scale today.    Objective:  Positive Derefield on the right. Active myofascial trigger points were present in the bilateral lower thoracic/thoracolumbar erector spinae, right lumbar erector spinae, and quadratus lumborum.  Less trigger point activity and hypertonicity were present in the right gluteus medius, right lateral piriformis, and right lateral gluteus tao.  Trigger point activity and hypertonicity in the right gluteus medius and piriformis is 75% less overall.  Intersegmental motion was decreased in the thoracic spine including joint dysfunctions at T9-10 and T11-12. Intersegmental motion was decreased in the lumbar spine including joint dysfunctions at L2-3 and L3-4.  Intersegmental motion was decreased in the right sacroiliac joint.  The right femoral acetabular joint was restricted from adduction due to discomfort in the right groin.  Hypertonicity is present in the right adductor angela.  Range of motion with active release technique maneuvers to the right adductor angela were better when compared to the last chiropractic visit.         I have used the  Epic copy/forward function to compose this note. I have reviewed my current note to ensure it reflects the current patient status, exam, assessment and plan.    Dictation voice to text software has been used in the creation of this document. Please consider this in light of any contextual or grammatical errors.

## 2024-10-28 ENCOUNTER — PROCEDURE VISIT (OUTPATIENT)
Age: 59
End: 2024-10-28
Payer: COMMERCIAL

## 2024-10-28 VITALS
WEIGHT: 164 LBS | OXYGEN SATURATION: 96 % | HEART RATE: 103 BPM | BODY MASS INDEX: 27.32 KG/M2 | SYSTOLIC BLOOD PRESSURE: 102 MMHG | DIASTOLIC BLOOD PRESSURE: 70 MMHG | HEIGHT: 65 IN

## 2024-10-28 DIAGNOSIS — M79.18 MYOFASCIAL PAIN: Primary | ICD-10-CM

## 2024-10-28 DIAGNOSIS — M99.02 SEGMENTAL DYSFUNCTION OF THORACIC REGION: ICD-10-CM

## 2024-10-28 DIAGNOSIS — M99.05 SOMATIC DYSFUNCTION OF HIP REGION: ICD-10-CM

## 2024-10-28 DIAGNOSIS — M53.3 SACROILIAC JOINT DYSFUNCTION: ICD-10-CM

## 2024-10-28 DIAGNOSIS — M99.03 SEGMENTAL DYSFUNCTION OF LUMBAR REGION: ICD-10-CM

## 2024-10-28 PROCEDURE — 98941 CHIROPRACT MANJ 3-4 REGIONS: CPT | Performed by: CHIROPRACTOR

## 2024-10-28 PROCEDURE — 98943 CHIROPRACT MANJ XTRSPINL 1/>: CPT | Performed by: CHIROPRACTOR

## 2024-10-28 NOTE — PATIENT INSTRUCTIONS
The patient was advised to continue with the icing and stretching instructions.   The patient was taught an iliopsoas/hip flexor stretch for home use. The need to stretch to the point of tension only and not to the point of pain was emphasized. The patient was advised to do the stretch at 3 sets of 15 to 20 seconds several times per day.  The patient was informed that she may experience additional soreness following the today's treatment visit. The need to continue with the stretching exercises and apply ice in 15-minute intervals was discussed with the patient.

## 2024-10-28 NOTE — PROGRESS NOTES
Assessment:  The patient walked 1-1/2 miles which is much further than she typically does and tolerated it without exacerbation.  Although the continued walking of being in the grocery store may have been too much for her current right hip condition.  The fact that the patient was able to tolerate the 1-1/2 mile walk is a positive improvement.  I began addressing the patient's iliopsoas muscle as the patient is indicating having upper abdominal and bilateral anterior pelvic discomfort. The patient was taught an iliopsoas/hip flexor stretch for home use. The need to stretch to the point of tension only and not to the point of pain was emphasized. The patient was advised to do the stretch at 3 sets of 15 to 20 seconds several times per day.  If the patient has persistent symptoms my may address the patient's iliacus muscle.    1. Myofascial pain        2. Segmental dysfunction of lumbar region        3. Sacroiliac joint dysfunction        4. Segmental dysfunction of thoracic region        5. Somatic dysfunction of hip region          Plan:  Treatment today consisted of myofascial release via Graston Technique to the bilateral lower thoracic/thoracolumbar lumbar erector spinae.  GT 3 was used.  Myofascial release via ischemic compression was performed to the right gluteus medius and piriformis.  Myofascial release via active release technique was performed to the bilateral iliopsoas (patient performed active movements).    Manipulation was performed to the right sacroiliac joint via Mari low force drop technique. Manipulation was performed to the lumbar restrictions via manual lumbar flexion distraction technique. Manipulation was performed to the lower thoracic restrictions via grade 1 mobilization techniques in the prone position.  No high velocity thrust was applied.    Manipulation was performed to the right femoral acetabular joint via grade 1 mobilization techniques and long axis traction  "technique.    Subjective:  The patient reported feeling better in the bilateral lower thoracic/thoracolumbar region when compared to the last chiropractic visit.  She did not indicate experiencing pain in the right groin region or right lower back region today.  The patient stated that she went on a mile and a half walk and felt very well without exacerbation of hip or back symptoms.  She stated that she then went to the grocery store and after walking for a period of time the store she was aware of pain in the right superior lateral gluteal region.  She stated that the pain in the right superior lateral gluteal region caused her to limp a bit.  Afterwards she went to her car to drive home and the right superior lateral gluteal symptoms fully resolved and did not recur for the remainder of the day or this morning.  She reported that the lower thoracic/thoracolumbar region was feeling much better over the weekend and today as well.  The patient denied experiencing symptoms in the right gluteal region over the weekend or today.  She typically has increased symptoms in the lower thoracic/thoracolumbar region with being a passenger in a car and when she is tense while driving.  She has decreased bilateral lower thoracic/thoracolumbar symptoms with doing the lower thoracic/thoracolumbar cat stretch.  She described the pain level as a 0 on a 0-10 pain scale today.  However she describes the pain level as a 6 or 7 on a 0-10 pain scale in the right superior lateral gluteal region when walking in the grocery store yesterday.  The patient stated that she had seen her gynecologist/urologist and he informed her that she has a \"tight pelvic muscle.\"  He referred her for physical therapy to release the tight pelvic musculature \"externally\" and \"internally.\"  The patient stated that she does not want to necessarily attend the physical therapy if she does not need to.    Objective:  Positive Derefield on the right. Active " myofascial trigger points were present in the bilateral lower thoracic/thoracolumbar erector spinae, right lumbar erector spinae, and quadratus lumborum.  Less trigger point activity and hypertonicity were present in the right gluteus medius, right lateral piriformis, right anterior/lateral gluteus medius and tensor fascia natty. Intersegmental motion was decreased in the thoracic spine including joint dysfunctions at T9-10 and T11-12. Intersegmental motion was decreased in the lumbar spine including joint dysfunctions at L2-3 and L3-4.  Intersegmental motion was decreased in the right sacroiliac joint.  The right femoral acetabular joint was restricted from adduction due to discomfort in the right groin.       I have used the Epic copy/forward function to compose this note. I have reviewed my current note to ensure it reflects the current patient status, exam, assessment and plan.    Dictation voice to text software has been used in the creation of this document. Please consider this in light of any contextual or grammatical errors.

## 2024-10-30 ENCOUNTER — PROCEDURE VISIT (OUTPATIENT)
Age: 59
End: 2024-10-30
Payer: COMMERCIAL

## 2024-10-30 VITALS
HEIGHT: 65 IN | SYSTOLIC BLOOD PRESSURE: 100 MMHG | OXYGEN SATURATION: 98 % | WEIGHT: 164 LBS | BODY MASS INDEX: 27.32 KG/M2 | HEART RATE: 74 BPM | DIASTOLIC BLOOD PRESSURE: 56 MMHG

## 2024-10-30 DIAGNOSIS — M99.05 SOMATIC DYSFUNCTION OF HIP REGION: ICD-10-CM

## 2024-10-30 DIAGNOSIS — M99.02 SEGMENTAL DYSFUNCTION OF THORACIC REGION: ICD-10-CM

## 2024-10-30 DIAGNOSIS — M99.03 SEGMENTAL DYSFUNCTION OF LUMBAR REGION: ICD-10-CM

## 2024-10-30 DIAGNOSIS — M53.3 SACROILIAC JOINT DYSFUNCTION: ICD-10-CM

## 2024-10-30 DIAGNOSIS — M79.18 MYOFASCIAL PAIN: Primary | ICD-10-CM

## 2024-10-30 PROCEDURE — 98941 CHIROPRACT MANJ 3-4 REGIONS: CPT | Performed by: CHIROPRACTOR

## 2024-10-30 PROCEDURE — 98943 CHIROPRACT MANJ XTRSPINL 1/>: CPT | Performed by: CHIROPRACTOR

## 2024-10-30 NOTE — PROGRESS NOTES
Assessment:  The patient is progressing as expected.    1. Myofascial pain        2. Segmental dysfunction of lumbar region        3. Sacroiliac joint dysfunction        4. Segmental dysfunction of thoracic region        5. Somatic dysfunction of hip region          Plan:  Treatment today consisted of myofascial release via Graston Technique to the bilateral lower thoracic/thoracolumbar lumbar erector spinae.  GT 3 was used.  Myofascial release via ischemic compression was performed to the right gluteus medius and piriformis.  Myofascial release via active release technique was performed to the bilateral iliopsoas (patient performed active movements).    Manipulation was performed to the right sacroiliac joint via Mari low force drop technique. Manipulation was performed to the lumbar restrictions via manual lumbar flexion distraction technique. Manipulation was performed to the lower thoracic restrictions via grade 2 mobilization techniques and low force diversified maneuvers.  No high velocity thrust was applied.  An audible release occurred and was well-tolerated.    Manipulation was performed to the right femoral acetabular joint via grade 1 mobilization techniques and long axis traction technique.    Subjective:  Once again the patient reported feeling better in the bilateral lower thoracic/thoracolumbar region when compared to the last chiropractic visit.  The patient stated that she went for a 3 lap walk today without exacerbation.  The patient reported that it is just short of a mile walk.  She stated she was aware of mild symptoms in the right superior lateral gluteal region.  She did not indicate experiencing pain in the right groin region or right mid region during a walk.  The patient stated that she remembers that, prior to chiropractic care, she was unable to walk a half a block without significant pain and needing to stop.  Therefore she stated that she is feeling much better since beginning  chiropractic care. She typically has increased symptoms in the lower thoracic/thoracolumbar region with being a passenger in a car and when she is tense while driving.  She has decreased bilateral lower thoracic/thoracolumbar symptoms with doing the lower thoracic/thoracolumbar cat stretch.  She described the pain level as a 0 on a 0-10 pain scale today.    The patient stated she has been doing the prescribed stretching exercises including the newly prescribed iliopsoas/hip flexor stretches.    Objective:  Positive Derefield on the right. Active myofascial trigger points were present in the bilateral lower thoracic/thoracolumbar erector spinae, right lumbar erector spinae, and quadratus lumborum.  Active myofascial trigger points and hypertonicity were present in the right anterior/lateral gluteus medius and tensor fascia natty. Intersegmental motion was decreased in the thoracic spine including joint dysfunctions at T7-8 and T10-11. Intersegmental motion was decreased in the lumbar spine including joint dysfunctions at L2-3 and L3-4.  Intersegmental motion was decreased in the right sacroiliac joint.  The right femoral acetabular joint was restricted from adduction due to discomfort in the right groin.       I have used the Epic copy/forward function to compose this note. I have reviewed my current note to ensure it reflects the current patient status, exam, assessment and plan.    Dictation voice to text software has been used in the creation of this document. Please consider this in light of any contextual or grammatical errors.

## 2024-11-01 ENCOUNTER — APPOINTMENT (OUTPATIENT)
Dept: LAB | Facility: AMBULARY SURGERY CENTER | Age: 59
End: 2024-11-01
Payer: COMMERCIAL

## 2024-11-01 DIAGNOSIS — R30.0 DYSURIA: ICD-10-CM

## 2024-11-01 LAB
BACTERIA UR QL AUTO: ABNORMAL /HPF
BILIRUB UR QL STRIP: NEGATIVE
CLARITY UR: ABNORMAL
COLOR UR: ABNORMAL
GLUCOSE UR STRIP-MCNC: NEGATIVE MG/DL
HGB UR QL STRIP.AUTO: ABNORMAL
KETONES UR STRIP-MCNC: NEGATIVE MG/DL
LEUKOCYTE ESTERASE UR QL STRIP: ABNORMAL
MUCOUS THREADS UR QL AUTO: ABNORMAL
NITRITE UR QL STRIP: NEGATIVE
NON-SQ EPI CELLS URNS QL MICRO: ABNORMAL /HPF
PH UR STRIP.AUTO: 6 [PH]
PROT UR STRIP-MCNC: ABNORMAL MG/DL
RBC #/AREA URNS AUTO: ABNORMAL /HPF
SP GR UR STRIP.AUTO: 1.02 (ref 1–1.03)
UROBILINOGEN UR STRIP-ACNC: <2 MG/DL
WBC #/AREA URNS AUTO: ABNORMAL /HPF

## 2024-11-01 PROCEDURE — 87186 SC STD MICRODIL/AGAR DIL: CPT

## 2024-11-01 PROCEDURE — 87077 CULTURE AEROBIC IDENTIFY: CPT

## 2024-11-01 PROCEDURE — 87086 URINE CULTURE/COLONY COUNT: CPT

## 2024-11-01 PROCEDURE — 81001 URINALYSIS AUTO W/SCOPE: CPT

## 2024-11-03 LAB — BACTERIA UR CULT: ABNORMAL

## 2024-11-04 ENCOUNTER — PROCEDURE VISIT (OUTPATIENT)
Age: 59
End: 2024-11-04
Payer: COMMERCIAL

## 2024-11-04 ENCOUNTER — OFFICE VISIT (OUTPATIENT)
Dept: FAMILY MEDICINE CLINIC | Facility: CLINIC | Age: 59
End: 2024-11-04
Payer: COMMERCIAL

## 2024-11-04 VITALS
SYSTOLIC BLOOD PRESSURE: 102 MMHG | BODY MASS INDEX: 27.32 KG/M2 | HEART RATE: 83 BPM | HEIGHT: 65 IN | DIASTOLIC BLOOD PRESSURE: 62 MMHG | OXYGEN SATURATION: 97 % | WEIGHT: 164 LBS

## 2024-11-04 VITALS
SYSTOLIC BLOOD PRESSURE: 118 MMHG | DIASTOLIC BLOOD PRESSURE: 64 MMHG | OXYGEN SATURATION: 96 % | RESPIRATION RATE: 16 BRPM | HEART RATE: 85 BPM | BODY MASS INDEX: 27.32 KG/M2 | WEIGHT: 164 LBS | HEIGHT: 65 IN | TEMPERATURE: 98.4 F

## 2024-11-04 DIAGNOSIS — Z13.29 SCREENING FOR ENDOCRINE, METABOLIC AND IMMUNITY DISORDER: ICD-10-CM

## 2024-11-04 DIAGNOSIS — Z13.0 SCREENING FOR ENDOCRINE, METABOLIC AND IMMUNITY DISORDER: ICD-10-CM

## 2024-11-04 DIAGNOSIS — R21 RASH AND NONSPECIFIC SKIN ERUPTION: ICD-10-CM

## 2024-11-04 DIAGNOSIS — M99.03 SEGMENTAL DYSFUNCTION OF LUMBAR REGION: ICD-10-CM

## 2024-11-04 DIAGNOSIS — R10.10 PAIN OF UPPER ABDOMEN: ICD-10-CM

## 2024-11-04 DIAGNOSIS — M99.05 SOMATIC DYSFUNCTION OF HIP REGION: ICD-10-CM

## 2024-11-04 DIAGNOSIS — M99.02 SEGMENTAL DYSFUNCTION OF THORACIC REGION: ICD-10-CM

## 2024-11-04 DIAGNOSIS — Z13.228 SCREENING FOR ENDOCRINE, METABOLIC AND IMMUNITY DISORDER: ICD-10-CM

## 2024-11-04 DIAGNOSIS — R10.2 PELVIC PAIN: ICD-10-CM

## 2024-11-04 DIAGNOSIS — N32.89 BLADDER WALL THICKENING: Primary | ICD-10-CM

## 2024-11-04 DIAGNOSIS — M79.18 MYOFASCIAL PAIN: Primary | ICD-10-CM

## 2024-11-04 DIAGNOSIS — M53.3 SACROILIAC JOINT DYSFUNCTION: ICD-10-CM

## 2024-11-04 DIAGNOSIS — M79.18 MYOFASCIAL PAIN: ICD-10-CM

## 2024-11-04 DIAGNOSIS — R89.9 ABNORMAL LABORATORY TEST: ICD-10-CM

## 2024-11-04 PROCEDURE — 99213 OFFICE O/P EST LOW 20 MIN: CPT | Performed by: NURSE PRACTITIONER

## 2024-11-04 PROCEDURE — 98943 CHIROPRACT MANJ XTRSPINL 1/>: CPT | Performed by: CHIROPRACTOR

## 2024-11-04 PROCEDURE — 98941 CHIROPRACT MANJ 3-4 REGIONS: CPT | Performed by: CHIROPRACTOR

## 2024-11-04 RX ORDER — SULFAMETHOXAZOLE AND TRIMETHOPRIM 800; 160 MG/1; MG/1
TABLET ORAL EVERY 12 HOURS
COMMUNITY
Start: 2024-11-01

## 2024-11-04 NOTE — PROGRESS NOTES
Assessment:  The patient is progressing as expected.    1. Myofascial pain        2. Segmental dysfunction of lumbar region        3. Sacroiliac joint dysfunction        4. Segmental dysfunction of thoracic region        5. Somatic dysfunction of hip region          Plan:  Treatment today consisted of myofascial release via Graston Technique to the bilateral lower thoracic/thoracolumbar lumbar erector spinae.  GT 3 was used.  Myofascial release via ischemic compression was performed to the right gluteus medius and piriformis.  Myofascial release via active release technique was performed to the bilateral iliopsoas (patient performed active movements).    Manipulation was performed to the right sacroiliac joint via Mari low force drop technique. Manipulation was performed to the lumbar restrictions via manual lumbar flexion distraction technique. Manipulation was performed to the lower thoracic restrictions via grade 2 mobilization techniques and low force diversified maneuvers.  No high velocity thrust was applied.  An audible release occurred and was well-tolerated.    Manipulation was performed to the right femoral acetabular joint via grade 1 mobilization techniques and long axis traction technique.    Subjective:  The patient reported feeling better in the bilateral lower thoracic/thoracolumbar region when compared to the last chiropractic visit.  The patient stated that she went for greater than 1 mile walk yesterday without exacerbation.  She stated that she was not aware of symptoms in the right groin region, right superior lateral gluteal region, or right superior to mid gluteal region.  The patient stated that she may be aware of some discomfort in her right groin region if she sits with her right lower extremity/knee crossed over her left lower extremity/knee.  If she notices the symptoms in the right groin at that time she will uncross her legs and the symptoms will subside immediately.  She reported  that she made be aware of occasional discomfort in the thoracolumbar region or in the left lower back region with certain positions but it is mild and relieved with moving from that position.  She indicated that she may be aware of some discomfort in her anterior lateral lower rib region if she is sitting in a slouched position and this has relieved by sitting more upright.  She typically has increased symptoms in the lower thoracic/thoracolumbar region with being a passenger in a car and when she is tense while driving.  She has decreased bilateral lower thoracic/thoracolumbar symptoms with doing the lower thoracic/thoracolumbar cat stretch.  She described the pain level as a 0 on a 0-10 pain scale today.      Objective:  Positive Derefield on the right. Active myofascial trigger points were present in the bilateral lower thoracic/thoracolumbar erector spinae, right lumbar erector spinae, and quadratus lumborum.  Active myofascial trigger points and hypertonicity were present in the right anterior/lateral gluteus medius and tensor fascia natty. Intersegmental motion was decreased in the thoracic spine including joint dysfunctions at T7-8 and T10-11. Intersegmental motion was decreased in the lumbar spine including joint dysfunctions at L2-3 and L3-4.  Intersegmental motion was decreased in the right sacroiliac joint.  The right femoral acetabular joint was restricted from adduction due to discomfort in the right groin.       I have used the Epic copy/forward function to compose this note. I have reviewed my current note to ensure it reflects the current patient status, exam, assessment and plan.    Dictation voice to text software has been used in the creation of this document. Please consider this in light of any contextual or grammatical errors.

## 2024-11-05 ENCOUNTER — TELEPHONE (OUTPATIENT)
Age: 59
End: 2024-11-05

## 2024-11-05 NOTE — ASSESSMENT & PLAN NOTE
Orders:    Immunoglobulin free LT chains blood; Future    IgG, IgA, IgM; Future    Protein electrophoresis, serum; Future    Protein electrophoresis, urine; Future    CBC and differential; Future    Comprehensive metabolic panel

## 2024-11-05 NOTE — PROGRESS NOTES
Ambulatory Visit  Name: Abigail Garcia      : 1965      MRN: [id number]  Encounter Provider: TARAS Collier  Encounter Date: 2024   Encounter department: [facility] PR[facility]    Assessment & Plan  Bladder wall thickening    Orders:  •  Immunoglobulin free LT chains blood; Future  •  IgG, IgA, IgM; Future  •  Protein electrophoresis, serum; Future  •  Protein electrophoresis, urine; Future  •  CBC and differential; Future  •  Comprehensive metabolic panel    Pelvic pain    Orders:  •  Immunoglobulin free LT chains blood; Future  •  IgG, IgA, IgM; Future  •  Protein electrophoresis, serum; Future  •  Protein electrophoresis, urine; Future  •  CBC and differential; Future  •  Comprehensive metabolic panel    Myofascial pain    Orders:  •  Immunoglobulin free LT chains blood; Future  •  IgG, IgA, IgM; Future  •  Protein electrophoresis, serum; Future  •  Protein electrophoresis, urine; Future  •  CBC and differential; Future  •  Comprehensive metabolic panel    Screening for endocrine, metabolic and immunity disorder    Orders:  •  Immunoglobulin free LT chains blood; Future  •  IgG, IgA, IgM; Future  •  Protein electrophoresis, serum; Future  •  Protein electrophoresis, urine; Future  •  CBC and differential; Future  •  Comprehensive metabolic panel    Abnormal laboratory test    Orders:  •  Immunoglobulin free LT chains blood; Future  •  IgG, IgA, IgM; Future  •  Protein electrophoresis, serum; Future  •  Protein electrophoresis, urine; Future  •  CBC and differential; Future  •  Comprehensive metabolic panel    Rash and nonspecific skin eruption    Orders:  •  Immunoglobulin free LT chains blood; Future  •  IgG, IgA, IgM; Future  •  Protein electrophoresis, serum; Future  •  Protein electrophoresis, urine; Future  •  CBC and differential; Future  •  Comprehensive metabolic panel    Pain of upper abdomen    Orders:  •  CBC and differential; Future  •  Comprehensive metabolic panel      The  "case discussed with patient using patient centered shared decision making.The patient was counseled regarding instructions for management,-- risk factor reductions,-- prognosis,-- impressions,-- risks and benefits of treatment options,-- importance of compliance with treatment. I have reviewed the instructions with the patient, answering all questions to her satisfaction.    Abigail and I had a lengthy discussion concerning her ongoing symptoms.  We reviewed diagnostics which have already been completed.  CAT scan of abdomen and pelvis unremarkable.  Ultrasound kidney and bladder showed nonspecific thickening of bladder wall.  She has already seen urologist.  Workup in progress.  Back x-rays show degenerative age-related changes.  She is working with chiropractor  CBC CMP in August were normal  Autoimmune workup in January 2022 normal  She is still concerned-having waxing and waning nonresolving weakness, fatigue, migratory myofascial pains, rashes, rib pain and hip pain refractory.  She is worried that there may be something \" brewing\"  Will look for possible immune disorder through blood work.  I advised against doing a broad-spectrum workup looking for many disorders.  She agrees.  Treatment plan to be determined.   Will call her with results     History of Present Illness     59-year-old female presents for same-day visit  She is ongoing vague constitutional symptoms which have been escalating over the past 2 years  She reports migrating myofascial pain, fatigue, rash, rib pain, right hip pain, weakness  The the symptoms are waxing and waning and never dissipate altogether  In 2022 she had autoimmune workup which was unremarkable.  KENISHA negative.  RF negative  Neck and back x-rays this past summer showed degenerative disease  CAT scan of abdomen and pelvis in August/September 2024 was normal essentially except mild bladder wall thickening.  She has been having cystitis  She is seeing chiropractor who works on her " joints but never illuminates pain  Ultrasound of bladder and kidneys revealed bladder wall thickening of unclear etiology  Cytology of bladder slightly abnormal cells of unclear significance  She is concerned that there may be an underlying disorder that has not yet been found  She denies unintentional weight loss, chest pain, hemoptysis, syncope, lethargy, disorientation  She has recovered from breast cancer  We again reviewed her past medical history in detail      History obtained from : patient  Review of Systems   Constitutional:  Positive for fatigue. Negative for fever and unexpected weight change.   HENT:  Negative for trouble swallowing.    Respiratory:  Negative for cough, shortness of breath and wheezing.    Cardiovascular: Negative.    Gastrointestinal:  Negative for abdominal pain and blood in stool.   Endocrine: Negative.    Genitourinary:  Positive for dysuria. Negative for difficulty urinating.   Musculoskeletal:  Positive for arthralgias, back pain and myalgias.   Skin:  Positive for rash.   Neurological:  Positive for weakness. Negative for dizziness and headaches.   Psychiatric/Behavioral:  Positive for sleep disturbance.      Medical History Reviewed by provider this encounter:  Tobacco  Allergies  Meds  Problems  Med Hx  Surg Hx  Fam Hx       Current Outpatient Medications on File Prior to Visit   Medication Sig Dispense Refill   • Bactrim -160 MG per tablet Every 12 hours     • Cholecalciferol (VITAMIN D3) 1,000 units tablet Take 1,000 Units by mouth daily     • rosuvastatin (CRESTOR) 10 MG tablet Take 1 tablet (10 mg total) by mouth daily 90 tablet 1   • methocarbamol (ROBAXIN) 500 mg tablet Take 1 tablet (500 mg total) by mouth 3 (three) times a day (Patient not taking: Reported on 8/22/2024) 30 tablet 1   • NAPROXEN SODIUM PO Take 200 mg by mouth if needed (Patient not taking: Reported on 11/4/2024)     • triamcinolone (KENALOG) 0.5 % cream Apply topically 2 (two) times a day  "(Patient not taking: Reported on 8/8/2024) 30 g 0   • zinc sulfate (ZINCATE) 220 mg capsule Take 220 mg by mouth daily (Patient not taking: Reported on 11/4/2024)       No current facility-administered medications on file prior to visit.          Objective     /64 (BP Location: Left arm, Patient Position: Sitting, Cuff Size: Standard)   Pulse 85   Temp 98.4 °F (36.9 °C) (Temporal)   Resp 16   Ht 5' 5\" (1.651 m)   Wt 74.4 kg (164 lb)   LMP  (LMP Unknown) Comment: LMP 2009  SpO2 96%   BMI 27.29 kg/m²     Physical Exam  Vitals and nursing note reviewed.   Constitutional:       General: She is not in acute distress.     Appearance: Normal appearance. She is not ill-appearing.   Skin:     Coloration: Skin is not pale.   Neurological:      General: No focal deficit present.      Mental Status: She is alert. Mental status is at baseline.   Psychiatric:         Mood and Affect: Mood normal.         Behavior: Behavior normal.       "

## 2024-11-05 NOTE — TELEPHONE ENCOUNTER
Patient is on antibiotic for a bladder infection.  Abigail would like to get bloodwork done this week.  Is there a certain amount of time that she needs to wait before going for bloodwork.  She finishes medication tomorrow.  Please call  back.  Thank you

## 2024-11-06 ENCOUNTER — PROCEDURE VISIT (OUTPATIENT)
Age: 59
End: 2024-11-06
Payer: COMMERCIAL

## 2024-11-06 ENCOUNTER — APPOINTMENT (OUTPATIENT)
Dept: LAB | Facility: AMBULARY SURGERY CENTER | Age: 59
End: 2024-11-06
Payer: COMMERCIAL

## 2024-11-06 VITALS — HEIGHT: 65 IN | HEART RATE: 102 BPM | BODY MASS INDEX: 27.32 KG/M2 | OXYGEN SATURATION: 95 % | WEIGHT: 164 LBS

## 2024-11-06 DIAGNOSIS — Z13.29 SCREENING FOR ENDOCRINE, METABOLIC AND IMMUNITY DISORDER: ICD-10-CM

## 2024-11-06 DIAGNOSIS — N32.89 BLADDER WALL THICKENING: ICD-10-CM

## 2024-11-06 DIAGNOSIS — M79.18 MYOFASCIAL PAIN: Primary | ICD-10-CM

## 2024-11-06 DIAGNOSIS — R10.2 PELVIC PAIN: ICD-10-CM

## 2024-11-06 DIAGNOSIS — M99.03 SEGMENTAL DYSFUNCTION OF LUMBAR REGION: ICD-10-CM

## 2024-11-06 DIAGNOSIS — M79.18 MYOFASCIAL PAIN: ICD-10-CM

## 2024-11-06 DIAGNOSIS — Z13.0 SCREENING FOR ENDOCRINE, METABOLIC AND IMMUNITY DISORDER: ICD-10-CM

## 2024-11-06 DIAGNOSIS — R21 RASH AND NONSPECIFIC SKIN ERUPTION: ICD-10-CM

## 2024-11-06 DIAGNOSIS — M99.05 SOMATIC DYSFUNCTION OF HIP REGION: ICD-10-CM

## 2024-11-06 DIAGNOSIS — R89.9 ABNORMAL LABORATORY TEST: ICD-10-CM

## 2024-11-06 DIAGNOSIS — M99.02 SEGMENTAL DYSFUNCTION OF THORACIC REGION: ICD-10-CM

## 2024-11-06 DIAGNOSIS — R10.10 PAIN OF UPPER ABDOMEN: ICD-10-CM

## 2024-11-06 DIAGNOSIS — Z13.228 SCREENING FOR ENDOCRINE, METABOLIC AND IMMUNITY DISORDER: ICD-10-CM

## 2024-11-06 DIAGNOSIS — M53.3 SACROILIAC JOINT DYSFUNCTION: ICD-10-CM

## 2024-11-06 LAB
ALBUMIN SERPL BCG-MCNC: 4.2 G/DL (ref 3.5–5)
ALP SERPL-CCNC: 68 U/L (ref 34–104)
ALT SERPL W P-5'-P-CCNC: 16 U/L (ref 7–52)
ANION GAP SERPL CALCULATED.3IONS-SCNC: 7 MMOL/L (ref 4–13)
AST SERPL W P-5'-P-CCNC: 18 U/L (ref 13–39)
BASOPHILS # BLD AUTO: 0.02 THOUSANDS/ÂΜL (ref 0–0.1)
BASOPHILS NFR BLD AUTO: 0 % (ref 0–1)
BILIRUB SERPL-MCNC: 0.31 MG/DL (ref 0.2–1)
BUN SERPL-MCNC: 13 MG/DL (ref 5–25)
CALCIUM SERPL-MCNC: 8.6 MG/DL (ref 8.4–10.2)
CHLORIDE SERPL-SCNC: 101 MMOL/L (ref 96–108)
CO2 SERPL-SCNC: 28 MMOL/L (ref 21–32)
CREAT SERPL-MCNC: 0.79 MG/DL (ref 0.6–1.3)
EOSINOPHIL # BLD AUTO: 0.11 THOUSAND/ÂΜL (ref 0–0.61)
EOSINOPHIL NFR BLD AUTO: 2 % (ref 0–6)
ERYTHROCYTE [DISTWIDTH] IN BLOOD BY AUTOMATED COUNT: 12.1 % (ref 11.6–15.1)
GFR SERPL CREATININE-BSD FRML MDRD: 81 ML/MIN/1.73SQ M
GLUCOSE P FAST SERPL-MCNC: 89 MG/DL (ref 65–99)
HCT VFR BLD AUTO: 37.7 % (ref 34.8–46.1)
HGB BLD-MCNC: 12.2 G/DL (ref 11.5–15.4)
IGA SERPL-MCNC: 130 MG/DL (ref 66–433)
IGG SERPL-MCNC: 692 MG/DL (ref 635–1741)
IGM SERPL-MCNC: 174 MG/DL (ref 45–281)
IMM GRANULOCYTES # BLD AUTO: 0.03 THOUSAND/UL (ref 0–0.2)
IMM GRANULOCYTES NFR BLD AUTO: 1 % (ref 0–2)
LYMPHOCYTES # BLD AUTO: 0.35 THOUSANDS/ÂΜL (ref 0.6–4.47)
LYMPHOCYTES NFR BLD AUTO: 7 % (ref 14–44)
MCH RBC QN AUTO: 29.7 PG (ref 26.8–34.3)
MCHC RBC AUTO-ENTMCNC: 32.4 G/DL (ref 31.4–37.4)
MCV RBC AUTO: 92 FL (ref 82–98)
MONOCYTES # BLD AUTO: 0.39 THOUSAND/ÂΜL (ref 0.17–1.22)
MONOCYTES NFR BLD AUTO: 8 % (ref 4–12)
NEUTROPHILS # BLD AUTO: 4.25 THOUSANDS/ÂΜL (ref 1.85–7.62)
NEUTS SEG NFR BLD AUTO: 82 % (ref 43–75)
NRBC BLD AUTO-RTO: 0 /100 WBCS
PLATELET # BLD AUTO: 170 THOUSANDS/UL (ref 149–390)
PMV BLD AUTO: 11 FL (ref 8.9–12.7)
POTASSIUM SERPL-SCNC: 4.1 MMOL/L (ref 3.5–5.3)
PROT SERPL-MCNC: 6.7 G/DL (ref 6.4–8.4)
RBC # BLD AUTO: 4.11 MILLION/UL (ref 3.81–5.12)
SODIUM SERPL-SCNC: 136 MMOL/L (ref 135–147)
WBC # BLD AUTO: 5.15 THOUSAND/UL (ref 4.31–10.16)

## 2024-11-06 PROCEDURE — 83521 IG LIGHT CHAINS FREE EACH: CPT

## 2024-11-06 PROCEDURE — 84166 PROTEIN E-PHORESIS/URINE/CSF: CPT

## 2024-11-06 PROCEDURE — 98941 CHIROPRACT MANJ 3-4 REGIONS: CPT | Performed by: CHIROPRACTOR

## 2024-11-06 PROCEDURE — 36415 COLL VENOUS BLD VENIPUNCTURE: CPT

## 2024-11-06 PROCEDURE — 82784 ASSAY IGA/IGD/IGG/IGM EACH: CPT

## 2024-11-06 PROCEDURE — 85025 COMPLETE CBC W/AUTO DIFF WBC: CPT

## 2024-11-06 PROCEDURE — 80053 COMPREHEN METABOLIC PANEL: CPT | Performed by: NURSE PRACTITIONER

## 2024-11-06 PROCEDURE — 98943 CHIROPRACT MANJ XTRSPINL 1/>: CPT | Performed by: CHIROPRACTOR

## 2024-11-06 PROCEDURE — 84165 PROTEIN E-PHORESIS SERUM: CPT

## 2024-11-06 NOTE — PATIENT INSTRUCTIONS
The patient was advised to continue with the icing and stretching instructions.   Get out of the vehicle frequently on drive to [facility].

## 2024-11-06 NOTE — PROGRESS NOTES
"Assessment:  The patient may be experiencing some aching from the Graston Technique performed the last chiropractic visit.  Therefore less invasive myofascial release via active release technique will be performed to the musculature today.  The patient was advised to get out of the vehicle intermittently on her drive to the [facility].    1. Myofascial pain        2. Segmental dysfunction of lumbar region        3. Sacroiliac joint dysfunction        4. Segmental dysfunction of thoracic region        5. Somatic dysfunction of hip region          Plan:  Treatment today consisted of myofascial release via active release technique to the bilateral lower thoracic/thoracolumbar erector spinae including the junction of the bilateral proximal quadratus lumborum and bilateral upper lumbar erector spinae (prone).  Myofascial release via ischemic compression was performed to the right gluteus medius and piriformis.  Myofascial release via active release technique was performed to the bilateral iliopsoas (patient performed active movements).    Manipulation was performed to the right sacroiliac joint via Mari low force drop technique. Manipulation was performed to the lumbar restrictions via manual lumbar flexion distraction technique. Manipulation was performed to the lower thoracic restrictions via grade 2 mobilization techniques and low force diversified maneuvers.  No high velocity thrust was applied.  An audible release occurred and was well-tolerated.    Manipulation was performed to the right femoral acetabular joint via grade 1 mobilization techniques and long axis traction technique.    Subjective:  The patient reported feeling \"a little achy\" in the bilateral lower thoracic/thoracolumbar region when compared to the last chiropractic visit.  The patient stated that she is aware that she can feel some discomfort associated with the Graston Technique and thought that may be the reason.  Otherwise she " stated that her right hip region is feeling very well.  She stated that she went for a long walk and did not feel any pain during or afterwards.  She stated that she was pleased.  She stated that she is traveling to the [place] later this week.  She is concerned as prolonged driving aggravated her back and hip condition in the past.  She is aware that she will need to get out of the vehicle at times in order to avoid exacerbation.  The patient has discomfort in her right groin region if she sits with her right lower extremity/knee crossed over her left lower extremity/knee.  The patient has discomfort in the thoracolumbar region with certain positions and it relieved with moving from that position.  She stated she is still aware of some discomfort in her anterior lateral lower rib region but is not sure if it is due to her back.  She typically has increased symptoms in the lower thoracic/thoracolumbar region with being a passenger in a car and when she is tense while driving.  She has decreased bilateral lower thoracic/thoracolumbar symptoms with doing the lower thoracic/thoracolumbar cat stretch.  She described the thoracolumbar pain level as a 1 on a 0-10 pain scale today.  She described her right hip pain as a 1 on a 0-10 pain scale today.    Objective:  Negative [place]ield on the left. Active myofascial trigger points were present in the bilateral lower thoracic/thoracolumbar erector spinae, right lumbar erector spinae, and quadratus lumborum.  Active myofascial trigger points were present in the right anterior/lateral gluteus medius and tensor fascia natty. Intersegmental motion was decreased in the thoracic spine including joint dysfunctions at T7-8 and T10-11. Intersegmental motion was decreased in the lumbar spine including joint dysfunctions at L2-3 and L3-4.  Intersegmental motion was decreased in the right sacroiliac joint.  The right femoral acetabular joint was restricted from  adduction due to discomfort in the right groin.       I have used the Epic copy/forward function to compose this note. I have reviewed my current note to ensure it reflects the current patient status, exam, assessment and plan.    Dictation voice to text software has been used in the creation of this document. Please consider this in light of any contextual or grammatical errors.

## 2024-11-07 LAB
KAPPA LC FREE SER-MCNC: 12.9 MG/L (ref 3.3–19.4)
KAPPA LC FREE/LAMBDA FREE SER: 1.37 {RATIO} (ref 0.26–1.65)
LAMBDA LC FREE SERPL-MCNC: 9.4 MG/L (ref 5.7–26.3)

## 2024-11-08 LAB
ALBUMIN SERPL ELPH-MCNC: 3.85 G/DL (ref 3.2–5.1)
ALBUMIN SERPL ELPH-MCNC: 61.1 % (ref 48–70)
ALBUMIN UR ELPH-MCNC: 100 %
ALPHA1 GLOB MFR UR ELPH: 0 %
ALPHA1 GLOB SERPL ELPH-MCNC: 0.3 G/DL (ref 0.15–0.47)
ALPHA1 GLOB SERPL ELPH-MCNC: 4.7 % (ref 1.8–7)
ALPHA2 GLOB MFR UR ELPH: 0 %
ALPHA2 GLOB SERPL ELPH-MCNC: 0.75 G/DL (ref 0.42–1.04)
ALPHA2 GLOB SERPL ELPH-MCNC: 11.9 % (ref 5.9–14.9)
B-GLOBULIN MFR UR ELPH: 0 %
BETA GLOB ABNORMAL SERPL ELPH-MCNC: 0.36 G/DL (ref 0.31–0.57)
BETA1 GLOB SERPL ELPH-MCNC: 5.7 % (ref 4.7–7.7)
BETA2 GLOB SERPL ELPH-MCNC: 5.3 % (ref 3.1–7.9)
BETA2+GAMMA GLOB SERPL ELPH-MCNC: 0.33 G/DL (ref 0.2–0.58)
GAMMA GLOB ABNORMAL SERPL ELPH-MCNC: 0.71 G/DL (ref 0.4–1.66)
GAMMA GLOB MFR UR ELPH: 0 %
GAMMA GLOB SERPL ELPH-MCNC: 11.3 % (ref 6.9–22.3)
IGG/ALB SER: 1.57 {RATIO} (ref 1.1–1.8)
PROT PATTERN SERPL ELPH-IMP: ABNORMAL
PROT PATTERN UR ELPH-IMP: NORMAL
PROT SERPL-MCNC: 6.3 G/DL (ref 6.4–8.2)
PROT UR-MCNC: 8.2 MG/DL

## 2024-11-08 PROCEDURE — 84165 PROTEIN E-PHORESIS SERUM: CPT | Performed by: STUDENT IN AN ORGANIZED HEALTH CARE EDUCATION/TRAINING PROGRAM

## 2024-11-08 PROCEDURE — 84166 PROTEIN E-PHORESIS/URINE/CSF: CPT | Performed by: STUDENT IN AN ORGANIZED HEALTH CARE EDUCATION/TRAINING PROGRAM

## 2024-11-12 ENCOUNTER — TELEPHONE (OUTPATIENT)
Age: 59
End: 2024-11-12

## 2024-11-12 NOTE — TELEPHONE ENCOUNTER
TARAS Roth  11/11/2024  4:50 PM EST Back to Top      Left message on Abigail's voicemail.  Testing is normal and reassuring.  I will call her back tomorrow to review in detail and explained the results.

## 2024-11-14 DIAGNOSIS — E78.2 MIXED HYPERLIPIDEMIA: ICD-10-CM

## 2024-11-14 DIAGNOSIS — R79.89 ABNORMAL CBC: ICD-10-CM

## 2024-11-14 DIAGNOSIS — M25.551 RIGHT HIP PAIN: ICD-10-CM

## 2024-11-14 DIAGNOSIS — R10.2 PELVIC PAIN: Primary | ICD-10-CM

## 2024-11-15 ENCOUNTER — HOSPITAL ENCOUNTER (OUTPATIENT)
Dept: RADIOLOGY | Facility: HOSPITAL | Age: 59
Discharge: HOME | End: 2024-11-15
Payer: COMMERCIAL

## 2024-11-15 DIAGNOSIS — M25.551 RIGHT HIP PAIN: ICD-10-CM

## 2024-11-15 DIAGNOSIS — R10.2 PELVIC PAIN: ICD-10-CM

## 2024-11-15 PROCEDURE — 73522 X-RAY EXAM HIPS BI 3-4 VIEWS: CPT

## 2024-11-18 ENCOUNTER — RESULTS FOLLOW-UP (OUTPATIENT)
Dept: FAMILY MEDICINE CLINIC | Facility: CLINIC | Age: 59
End: 2024-11-18

## 2024-12-02 ENCOUNTER — EVALUATION (OUTPATIENT)
Dept: PHYSICAL THERAPY | Facility: REHABILITATION | Age: 59
End: 2024-12-02
Payer: COMMERCIAL

## 2024-12-02 DIAGNOSIS — R10.2 PELVIC PAIN: Primary | ICD-10-CM

## 2024-12-02 DIAGNOSIS — M25.551 RIGHT HIP PAIN: ICD-10-CM

## 2024-12-02 PROCEDURE — 97162 PT EVAL MOD COMPLEX 30 MIN: CPT | Performed by: PHYSICAL THERAPIST

## 2024-12-02 PROCEDURE — 97110 THERAPEUTIC EXERCISES: CPT | Performed by: PHYSICAL THERAPIST

## 2024-12-02 PROCEDURE — 97530 THERAPEUTIC ACTIVITIES: CPT | Performed by: PHYSICAL THERAPIST

## 2024-12-02 NOTE — LETTER
December 3, 2024    Willard Montgomery MD  [address]  [address]  [place] [address]    Patient: Abigail Garcia   YOB: 1965   Date of Visit: 2024     Encounter Diagnosis     ICD-10-CM    1. Pelvic pain  R10.2       2. Right hip pain  M25.551           Dear Dr. Montgomery:    Thank you for your recent referral of Abigail Garcia. Please review the attached evaluation summary from Abigail's recent visit.     Please verify that you agree with the plan of care by signing the attached order.     If you have any questions or concerns, please do not hesitate to call.     I sincerely appreciate the opportunity to share in the care of one of your patients and hope to have another opportunity to work with you in the near future.       Sincerely,    Noreen Ventura, PT      Referring Provider:      I certify that I have read the below Plan of Care and certify the need for these services furnished under this plan of treatment while under my care.                    Willard Montgomery MD  [address]  [address]  [place] [address]  Via Fax: [phone]          PT Evaluation     Today's date: 2024  Patient name: Abigail Garcia  : 1965  MRN: [id number]  Referring provider: Willard Montgomery*  Dx:   Encounter Diagnosis     ICD-10-CM    1. Pelvic pain  R10.2       2. Right hip pain  M25.551           Start Time: 1750  Stop Time: 1850  Total time in clinic (min): 60 minutes    Assessment  Impairments: abnormal or restricted ROM, impaired physical strength and lacks appropriate home exercise program    Assessment details: Abigail Garcia is a 59 y.o. year old female who presents to  with Pelvic pain  (primary encounter diagnosis)  Right hip pain.  Abigail has a history of lower abdominal/pelvic pain. Patient deferred pelvic floor muscle examination. Based on additional testing she presents with limitations in hip mobility and strength. Abigail presents with the impairments as listed  above and would benefit from Physical Therapy to address these impairments, improved quality of life and to maximize function.     Education provided today:   Pelvic floor anatomy and function  Physiology/relationship of abdominal canister and pelvis/pelvic organs/pelvic floor muscles  Diaphragm and Diaphragmatic breathing  Bowel and Bladder anatomy and function  PT exam and course of treatment                    Goals  Short-Term Goals: 2-4 weeks  1. Patient will report <2/10 pelvic and hip pain.  2. Patient will demonstrate improved right hip mobility and good understanding of PFM downtraining.    Long-Term Goals: 4-6 weeks  1. Patient will demonstrate improved R hip strength to 4+/5.  2. Patient independent with HEP at time of discharge.       Plan  Patient would benefit from: skilled physical therapy    Frequency: 1x week  Duration in weeks: 8  Plan of Care beginning date: 12/2/2024  Plan of Care expiration date: 1/27/2025  Treatment plan discussed with: patient        PT Pelvic Floor Subjective:   History of Present Illness:   Patient reports having some pelvic/lower abdominal pain in October. She reports urinalysis came back with atypical cells and she has a history of bladder cancer in her family therefore was referred to Dr. Montgomery for further evaluation. He performed a cytoscopy revealing a polyp and cyst in the bladder. She was told at that time she had tension in her pelvic floor muscles. Over the last few weeks this pain has resolved. She denies any additional pelvic floor symptoms.    GYN - Marissa BUSH             Social Support:     Lives with:  Spouse    Relationship status: /committed    Work status: employed part time ()  Diet and Exercise:      Exercise type: walking    Exercise frequency: daily  Co-morbidities:    T7/T8 fracture over 20 years ago - having occasional ribcage pain   Right hip arthritis with pain in the groin with prolonged walking  OB/ gyn History     Gestational History:     Prior Pregnancy: Yes      Number of prior pregnancies: 2    Number of term pregnancies: 2    Delivery Type: vaginal delivery      Number of vaginal deliveries: 2    Delivery Complications:  Both children almost 10 lbs   Forceps delivery for first delivery     Menstrual History:    no hormone replacement therapy  Chemotherapy stopped menstrual cycle at age 43   Bladder Function:     Voiding Difficulties negative for: urgency and frequent urination       Voiding Difficulties comments:     Urinary leakage: no urine leakage    Urinary leakage not aggravated by: coughing, sneezing, walking to the bathroom and laughing    Nocturia (episodes per night): 0    Painful urination: No      Fluid Intake Type:  Water, diet soda and tea    Intake (ounces):     Intake (ounces) comment: Water: 10-12 oz   Will mix iced tea and water   Diet soda: 12-24 oz  Diet iced tea  Bowel Function:     Bowel Function comments:  Once in awhile may get constipation    Bowel frequency: daily and every 2 days  Sexual Function:     Sexually Active:  Sexually active    Pain during intercourse: No      pain does not cause abstinence  Pain:     Current pain ratin    At worst pain ratin    Location:  Lower abdomen - both sides; ribcage    Onset:  1-3 months ago    Quality:  Dull ache    Aggravating factors:  Prolonged positions (ribcage sitting for too long)  Patient Goals:     Patient goals for therapy:  Improved comfort, improved pain management and return to sport/leisure activities    Other patient goals:  Bring right foot onto left leg      Objective     Palpation     Right   Hypertonic in the piriformis.     Tenderness     Left Hip   No tenderness in the PSIS and greater trochanter.     Right Hip   No tenderness in the PSIS and greater trochanter.     Strength/Myotome Testing     Left Hip   Planes of Motion   Flexion: 5  Extension: 4  Abduction: 5  External rotation: 4+  Internal rotation: 4+    Right Hip   Planes  "of Motion   Flexion: 5  Extension: 4  Abduction: 4  External rotation: 4+  Internal rotation: 4+    Left Knee   Flexion: 5  Extension: 5    Right Knee   Flexion: 5  Extension: 5    Left Ankle/Foot   Plantar flexion: 5    Right Ankle/Foot   Plantar flexion: 5    Tests     Lumbar     Left   Negative passive SLR.     Right   Negative passive SLR.     Left Hip   Negative JIM.     Right Hip   Negative JIM and scour.              Precautions:   Past Medical History:   Diagnosis Date   • Allergic contact dermatitis due to plants, except food 04/26/2021   • Allergic rhinitis    • Back pain     \"bilat rib cage discomfort -hx of hairline T 8 fracture\"   • BMI 25.0-25.9,adult 11/23/2020   • Cancer (HCC) 2008   • Claustrophobia     \"anything too close to face\"   • Ear problems    • Environmental allergies    • Fatty liver    • Fracture of T8 vertebra (HCC)     hairline fracture   • Gastric polyps    • Gastritis    • GERD (gastroesophageal reflux disease)     last egd 2/2016   • History of breast cancer 2008    right breast was on tamoxifen and was done in 3/2014   • History of cancer chemotherapy 2009   • History of mammogram 02/01/2018   • History of radiation therapy    • HL (hearing loss)    • Hyperlipidemia    • Immunization deficiency 05/27/2020   • Impaired fasting glucose 05/27/2020   • Limb alert care status     No BP/IV Right Arm   • Neck muscle spasm     to [facility] ED 10/21/23 --to see Dr Azevedo 10/25/23 and will let him know   • Prediabetes 11/23/2020   • Tinnitus    • Upper abdominal pain     saw her doctor 9/25/23 for \"abdominal cramping\"-told related probably  to irritation-none noted today per pt-instructed to call doctor if s/s would return   • Vitamin D deficiency 05/27/2020    17   • Wears glasses      BREAST CANCER 2008      Manuals 12/2            R glute STM                                                    Neuro Re-Ed             SEMG Biofeedback             Diaphragmatic Breathing nv            C with " "Diaphragmatic Breathing nv            Child's Pose with DiaphragmaticBreathing nv                                      Ther Ex             Seated piriformis st 3x20\" b/l            Seated HS st 3x20\" b/l            Adductor st             SLR abd nv            Clamshells nv            Bridging nv            Thoracic ext st 1/2 pillow w LTR nv                         TB mid rows             No moneys                          Ther Activity             Patient Education Done                                                                                          "

## 2024-12-02 NOTE — PROGRESS NOTES
PT Evaluation     Today's date: 2024  Patient name: Abigail Garcia  : 1965  MRN: [id number]  Referring provider: Willard Montgomery*  Dx:   Encounter Diagnosis     ICD-10-CM    1. Pelvic pain  R10.2       2. Right hip pain  M25.551           Start Time: 1750  Stop Time: 1850  Total time in clinic (min): 60 minutes    Assessment  Impairments: abnormal or restricted ROM, impaired physical strength and lacks appropriate home exercise program    Assessment details: Abigail Garcia is a 59 y.o. year old female who presents to IE with Pelvic pain  (primary encounter diagnosis)  Right hip pain.  Abigail has a history of lower abdominal/pelvic pain. Patient deferred pelvic floor muscle examination. Based on additional testing she presents with limitations in hip mobility and strength. Abigail presents with the impairments as listed above and would benefit from Physical Therapy to address these impairments, improved quality of life and to maximize function.     Education provided today:   Pelvic floor anatomy and function  Physiology/relationship of abdominal canister and pelvis/pelvic organs/pelvic floor muscles  Diaphragm and Diaphragmatic breathing  Bowel and Bladder anatomy and function  PT exam and course of treatment                    Goals  Short-Term Goals: 2-4 weeks  1. Patient will report <2/10 pelvic and hip pain.  2. Patient will demonstrate improved right hip mobility and good understanding of PFM downtraining.    Long-Term Goals: 4-6 weeks  1. Patient will demonstrate improved R hip strength to 4+/5.  2. Patient independent with HEP at time of discharge.       Plan  Patient would benefit from: skilled physical therapy    Frequency: 1x week  Duration in weeks: 8  Plan of Care beginning date: 2024  Plan of Care expiration date: 2025  Treatment plan discussed with: patient        PT Pelvic Floor Subjective:   History of Present Illness:   Patient reports having some pelvic/lower abdominal pain in  October. She reports urinalysis came back with atypical cells and she has a history of bladder cancer in her family therefore was referred to Dr. Montgomery for further evaluation. He performed a cytoscopy revealing a polyp and cyst in the bladder. She was told at that time she had tension in her pelvic floor muscles. Over the last few weeks this pain has resolved. She denies any additional pelvic floor symptoms.    GYN - Marissa BUSH             Social Support:     Lives with:  Spouse    Relationship status: /committed    Work status: employed part time ()  Diet and Exercise:      Exercise type: walking    Exercise frequency: daily  Co-morbidities:    T7/T8 fracture over 20 years ago - having occasional ribcage pain   Right hip arthritis with pain in the groin with prolonged walking  OB/ gyn History    Gestational History:     Prior Pregnancy: Yes      Number of prior pregnancies: 2    Number of term pregnancies: 2    Delivery Type: vaginal delivery      Number of vaginal deliveries: 2    Delivery Complications:  Both children almost 10 lbs   Forceps delivery for first delivery     Menstrual History:    no hormone replacement therapy  Chemotherapy stopped menstrual cycle at age 43   Bladder Function:     Voiding Difficulties negative for: urgency and frequent urination       Voiding Difficulties comments:     Urinary leakage: no urine leakage    Urinary leakage not aggravated by: coughing, sneezing, walking to the bathroom and laughing    Nocturia (episodes per night): 0    Painful urination: No      Fluid Intake Type:  Water, diet soda and tea    Intake (ounces):     Intake (ounces) comment: Water: 10-12 oz   Will mix iced tea and water   Diet soda: 12-24 oz  Diet iced tea  Bowel Function:     Bowel Function comments:  Once in awhile may get constipation    Bowel frequency: daily and every 2 days  Sexual Function:     Sexually Active:  Sexually active    Pain during intercourse: No       "pain does not cause abstinence  Pain:     Current pain ratin    At worst pain ratin    Location:  Lower abdomen - both sides; ribcage    Onset:  1-3 months ago    Quality:  Dull ache    Aggravating factors:  Prolonged positions (ribcage sitting for too long)  Patient Goals:     Patient goals for therapy:  Improved comfort, improved pain management and return to sport/leisure activities    Other patient goals:  Bring right foot onto left leg      Objective     Palpation     Right   Hypertonic in the piriformis.     Tenderness     Left Hip   No tenderness in the PSIS and greater trochanter.     Right Hip   No tenderness in the PSIS and greater trochanter.     Strength/Myotome Testing     Left Hip   Planes of Motion   Flexion: 5  Extension: 4  Abduction: 5  External rotation: 4+  Internal rotation: 4+    Right Hip   Planes of Motion   Flexion: 5  Extension: 4  Abduction: 4  External rotation: 4+  Internal rotation: 4+    Left Knee   Flexion: 5  Extension: 5    Right Knee   Flexion: 5  Extension: 5    Left Ankle/Foot   Plantar flexion: 5    Right Ankle/Foot   Plantar flexion: 5    Tests     Lumbar     Left   Negative passive SLR.     Right   Negative passive SLR.     Left Hip   Negative JIM.     Right Hip   Negative JIM and scour.              Precautions:   Past Medical History:   Diagnosis Date    Allergic contact dermatitis due to plants, except food 2021    Allergic rhinitis     Back pain     \"bilat rib cage discomfort -hx of hairline T 8 fracture\"    BMI 25.0-25.9,adult 2020    Cancer (HCC)     Claustrophobia     \"anything too close to face\"    Ear problems     Environmental allergies     Fatty liver     Fracture of T8 vertebra (HCC)     hairline fracture    Gastric polyps     Gastritis     GERD (gastroesophageal reflux disease)     last egd 2016    History of breast cancer     right breast was on tamoxifen and was done in 3/2014    History of cancer chemotherapy 2009    History " "of mammogram 02/01/2018    History of radiation therapy     HL (hearing loss)     Hyperlipidemia     Immunization deficiency 05/27/2020    Impaired fasting glucose 05/27/2020    Limb alert care status     No BP/IV Right Arm    Neck muscle spasm     to [facility] ED 10/21/23 --to see Dr Azevedo 10/25/23 and will let him know    Prediabetes 11/23/2020    Tinnitus     Upper abdominal pain     saw her doctor 9/25/23 for \"abdominal cramping\"-told related probably  to irritation-none noted today per pt-instructed to call doctor if s/s would return    Vitamin D deficiency 05/27/2020    17    Wears glasses      BREAST CANCER 2008      Manuals 12/2            R glute STM                                                    Neuro Re-Ed             SEMG Biofeedback             Diaphragmatic Breathing nv            DKC with Diaphragmatic Breathing nv            Child's Pose with DiaphragmaticBreathing nv                                      Ther Ex             Seated piriformis st 3x20\" b/l            Seated HS st 3x20\" b/l            Adductor st             SLR abd nv            Clamshells nv            Bridging nv            Thoracic ext st 1/2 pillow w LTR nv                         TB mid rows             No moneys                          Ther Activity             Patient Education Done                                                                          "

## 2024-12-02 NOTE — LETTER
2024    No Recipients    Patient: Abigail Garcia   YOB: 1965   Date of Visit: 2024     Encounter Diagnosis     ICD-10-CM    1. Pelvic pain  R10.2       2. Right hip pain  M25.551           Dear Dr. Montgomery:    Thank you for your recent referral of Abigail Garcia. Please review the attached evaluation summary from Abigail's recent visit.     Please verify that you agree with the plan of care by signing the attached order.     If you have any questions or concerns, please do not hesitate to call.     I sincerely appreciate the opportunity to share in the care of one of your patients and hope to have another opportunity to work with you in the near future.       Sincerely,    Noreen Ventura, PT      Referring Provider:      I certify that I have read the below Plan of Care and certify the need for these services furnished under this plan of treatment while under my care.                    Willard Montgomery MD  [address]  [address]  [facility] [address]  Via In Basket          PT Evaluation     Today's date: 2024  Patient name: Abigail Garcia  : 1965  MRN: [id number]  Referring provider: Elyssa Mcgill CRNP  Dx: No diagnosis found.                 Plan    Plan of Care beginning date: 2024  Treatment plan discussed with: patient        PT Pelvic Floor Subjective:   History of Present Illness:   Patient is a 59 y.o. presenting to physical therapy with complaints of     No painful pelvic exams    Old fracture in T7-T8 was having ribcage pain and pelvic pain. Atypical cells in urine.   UTI   - short lived pelvic pain   Cystoscopy - polyp and cyst in bladder - may get these biopsied   GYN - Marissa Christopher - [facility]     Hip pain - arthritis  Right hip pain and tightness          Social Support:     Lives with:  Spouse    Relationship status: /committed    Work status: employed part time ()  Diet and Exercise:      Exercise type: walking    Exercise  frequency: daily  OB/ gyn History    Gestational History:     Prior Pregnancy: Yes      Number of prior pregnancies: 2    Number of term pregnancies: 2    Delivery Type: vaginal delivery      Number of vaginal deliveries: 2    Delivery Complications:  Both children almost 10 lbs   Forceps delivery for first delivery     Menstrual History:    no hormone replacement therapy  Chemotherapy stopped menstrual cycle at age 43   Bladder Function:     Voiding Difficulties negative for: urgency and frequent urination       Voiding Difficulties comments:     Urinary leakage: no urine leakage    Urinary leakage not aggravated by: coughing, sneezing, walking to the bathroom and laughing    Nocturia (episodes per night): 0    Painful urination: No      Fluid Intake Type:  Water, diet soda and tea    Intake (ounces):     Intake (ounces) comment: Water: 10-12 oz   Will mix iced tea and water   Diet soda: 12-24 oz  Diet iced tea  Bowel Function:     Bowel Function comments:  Once in awhile may get constipation    Bowel frequency: daily and every 2 days  Sexual Function:     Sexually Active:  Sexually active    Pain during intercourse: No      pain does not cause abstinence  Pain:     Current pain ratin    At worst pain ratin    Location:  Lower abdomen - both sides; ribcage    Onset:  1-3 months ago    Quality:  Dull ache    Aggravating factors:  Prolonged positions (ribcage sitting for too long)  Patient Goals:     Patient goals for therapy:  Improved comfort, improved pain management and return to sport/leisure activities      Objective           Precautions: ***      Manuals                                                                 Neuro Re-Ed                                                                                                        Ther Ex                                                                                                                     Ther Activity                                        Gait Training                                       Modalities

## 2024-12-09 ENCOUNTER — APPOINTMENT (OUTPATIENT)
Dept: PHYSICAL THERAPY | Facility: REHABILITATION | Age: 59
End: 2024-12-09
Payer: COMMERCIAL

## 2024-12-11 ENCOUNTER — HOSPITAL ENCOUNTER (OUTPATIENT)
Dept: GASTROENTEROLOGY | Facility: AMBULARY SURGERY CENTER | Age: 59
Setting detail: OUTPATIENT SURGERY
Discharge: HOME | End: 2024-12-11
Attending: INTERNAL MEDICINE
Payer: COMMERCIAL

## 2024-12-11 ENCOUNTER — ANESTHESIA (OUTPATIENT)
Dept: GASTROENTEROLOGY | Facility: AMBULARY SURGERY CENTER | Age: 59
End: 2024-12-11
Payer: COMMERCIAL

## 2024-12-11 VITALS
WEIGHT: 160 LBS | HEART RATE: 68 BPM | DIASTOLIC BLOOD PRESSURE: 72 MMHG | BODY MASS INDEX: 27.31 KG/M2 | HEIGHT: 64 IN | TEMPERATURE: 97.3 F | RESPIRATION RATE: 19 BRPM | SYSTOLIC BLOOD PRESSURE: 106 MMHG | OXYGEN SATURATION: 98 %

## 2024-12-11 DIAGNOSIS — Z12.11 SCREENING FOR COLON CANCER: ICD-10-CM

## 2024-12-11 PROCEDURE — 45385 COLONOSCOPY W/LESION REMOVAL: CPT | Performed by: INTERNAL MEDICINE

## 2024-12-11 PROCEDURE — 88305 TISSUE EXAM BY PATHOLOGIST: CPT | Performed by: PATHOLOGY

## 2024-12-11 PROCEDURE — 45380 COLONOSCOPY AND BIOPSY: CPT | Performed by: INTERNAL MEDICINE

## 2024-12-11 RX ORDER — SODIUM CHLORIDE, SODIUM LACTATE, POTASSIUM CHLORIDE, CALCIUM CHLORIDE 600; 310; 30; 20 MG/100ML; MG/100ML; MG/100ML; MG/100ML
INJECTION, SOLUTION INTRAVENOUS CONTINUOUS PRN
Status: DISCONTINUED | OUTPATIENT
Start: 2024-12-11 | End: 2024-12-11

## 2024-12-11 RX ORDER — PROPOFOL 10 MG/ML
INJECTION, EMULSION INTRAVENOUS AS NEEDED
Status: DISCONTINUED | OUTPATIENT
Start: 2024-12-11 | End: 2024-12-11

## 2024-12-11 RX ORDER — LIDOCAINE HYDROCHLORIDE 20 MG/ML
INJECTION, SOLUTION EPIDURAL; INFILTRATION; INTRACAUDAL; PERINEURAL AS NEEDED
Status: DISCONTINUED | OUTPATIENT
Start: 2024-12-11 | End: 2024-12-11

## 2024-12-11 RX ADMIN — SODIUM CHLORIDE, SODIUM LACTATE, POTASSIUM CHLORIDE, AND CALCIUM CHLORIDE: .6; .31; .03; .02 INJECTION, SOLUTION INTRAVENOUS at 11:22

## 2024-12-11 RX ADMIN — LIDOCAINE HYDROCHLORIDE 60 MG: 20 INJECTION, SOLUTION EPIDURAL; INFILTRATION; INTRACAUDAL at 11:17

## 2024-12-11 RX ADMIN — PROPOFOL 20 MG: 10 INJECTION, EMULSION INTRAVENOUS at 11:19

## 2024-12-11 RX ADMIN — PROPOFOL 80 MG: 10 INJECTION, EMULSION INTRAVENOUS at 11:17

## 2024-12-11 RX ADMIN — PROPOFOL 50 MG: 10 INJECTION, EMULSION INTRAVENOUS at 11:21

## 2024-12-11 RX ADMIN — PROPOFOL 50 MG: 10 INJECTION, EMULSION INTRAVENOUS at 11:27

## 2024-12-11 RX ADMIN — PROPOFOL 50 MG: 10 INJECTION, EMULSION INTRAVENOUS at 11:30

## 2024-12-11 RX ADMIN — PROPOFOL 50 MG: 10 INJECTION, EMULSION INTRAVENOUS at 11:23

## 2024-12-11 RX ADMIN — SODIUM CHLORIDE, SODIUM LACTATE, POTASSIUM CHLORIDE, AND CALCIUM CHLORIDE: .6; .31; .03; .02 INJECTION, SOLUTION INTRAVENOUS at 11:09

## 2024-12-11 RX ADMIN — PROPOFOL 50 MG: 10 INJECTION, EMULSION INTRAVENOUS at 11:25

## 2024-12-11 NOTE — ANESTHESIA PREPROCEDURE EVALUATION
Procedure:  COLONOSCOPY    Relevant Problems   CARDIO   (+) Hyperlipidemia      MUSCULOSKELETAL   (+) Segmental dysfunction of lumbar region   (+) Segmental dysfunction of thoracic region      TTE Jan 2023:    Left Ventricle: Left ventricular cavity size is normal. Wall thickness is normal. The left ventricular ejection fraction is 65%. Systolic function is normal. Wall motion is normal. Diastolic function is normal.    Right Ventricle: Right ventricular cavity size is normal. Systolic function is normal.      Physical Exam    Airway    Mallampati score: II  TM Distance: >3 FB  Neck ROM: full     Dental       Cardiovascular  Cardiovascular exam normal    Pulmonary  Pulmonary exam normal     Other Findings  post-pubertal.      Anesthesia Plan  ASA Score- 2     Anesthesia Type- IV sedation with anesthesia with ASA Monitors.         Additional Monitors:     Airway Plan:            Plan Factors-    Chart reviewed. EKG reviewed.  Existing labs reviewed. Patient summary reviewed.                  Induction- intravenous.    Postoperative Plan-         Informed Consent- Anesthetic plan and risks discussed with patient.  I personally reviewed this patient with the CRNA. Discussed and agreed on the Anesthesia Plan with the CRNA..

## 2024-12-11 NOTE — H&P
"History and Physical -  [facility]  Abigail Garcia 59 y.o. female MRN: [id number]    HPI: Abigail Garcia is a 59 y.o. year old female who presents with screening colonoscopy      Review of Systems    Historical Information   Past Medical History:   Diagnosis Date    Allergic contact dermatitis due to plants, except food 04/26/2021    Allergic rhinitis     Back pain     \"bilat rib cage discomfort -hx of hairline T 8 fracture\"    BMI 25.0-25.9,adult 11/23/2020    Cancer (HCC) 2008    Claustrophobia     \"anything too close to face\"    Ear problems     Environmental allergies     Fatty liver     Fracture of T8 vertebra (HCC)     hairline fracture    Gastric polyps     Gastritis     GERD (gastroesophageal reflux disease)     last egd 2/2016    History of breast cancer 2008    right breast was on tamoxifen and was done in 3/2014    History of cancer chemotherapy 2009    History of mammogram 02/01/2018    History of radiation therapy     HL (hearing loss)     Hyperlipidemia     Immunization deficiency 05/27/2020    Impaired fasting glucose 05/27/2020    Limb alert care status     No BP/IV Right Arm    Neck muscle spasm     to [facility] ED 10/21/23 --to see Dr Azevedo 10/25/23 and will let him know    Prediabetes 11/23/2020    Tinnitus     Upper abdominal pain     saw her doctor 9/25/23 for \"abdominal cramping\"-told related probably  to irritation-none noted today per pt-instructed to call doctor if s/s would return    Vitamin D deficiency 05/27/2020    17    Wears glasses      Past Surgical History:   Procedure Laterality Date    BREAST LUMPECTOMY Right 01/01/2008    COLONOSCOPY  01/01/2014    COLONOSCOPY      RI XCAPSL CTRC RMVL INSJ IO LENS PROSTH W/O ECP Left 10/2/2023    Procedure: EXTRACTION EXTRACAPSULAR CATARACT PHACO INTRAOCULAR LENS (IOL);  Surgeon: Blake Azevedo MD;  Location: [facility] MAIN OR;  Service: Ophthalmology    RI XCAPSL CTRC RMVL INSJ IO LENS PROSTH W/O ECP Right 10/30/2023    Procedure: " "EXTRACTION EXTRACAPSULAR CATARACT PHACO INTRAOCULAR LENS (IOL);  Surgeon: Blake Azevedo MD;  Location: [facility] MAIN OR;  Service: Ophthalmology    UPPER GASTROINTESTINAL ENDOSCOPY       Social History   Social History     Substance and Sexual Activity   Alcohol Use Yes    Comment: ocassional -special occasion only 1/year     Social History     Substance and Sexual Activity   Drug Use Never     Social History     Tobacco Use   Smoking Status Never   Smokeless Tobacco Never     Family History   Problem Relation Age of Onset    Hypertension Mother     Heart disease Mother     Hyperlipidemia Mother     Skin cancer Father         basal call carcinoma     Hyperlipidemia Father     Liver disease Sister     Heart disease Brother     Breast cancer Maternal Aunt     Esophageal cancer Maternal Uncle     Esophageal cancer Paternal Aunt        Meds/Allergies     Not in a hospital admission.    Allergies   Allergen Reactions    Medical Tape Rash     Sometimes adhesive       Objective     /63   Pulse 75   Temp 98.1 °F (36.7 °C) (Temporal)   Resp 16   Ht 5' 4\" (1.626 m)   Wt 72.6 kg (160 lb)   LMP  (LMP Unknown) Comment: LMP 2009  SpO2 98%   BMI 27.46 kg/m²       PHYSICAL EXAM    Gen: NAD  CV: RRR  CHEST: Clear  ABD: soft, NT/ND  EXT: no edema  Neuro: AAO      ASSESSMENT/PLAN:  This is a 59 y.o. year old female here for screening colonoscopy    PLAN:   Procedure: colonoscopy      "

## 2024-12-11 NOTE — ANESTHESIA POSTPROCEDURE EVALUATION
Post-Op Assessment Note    CV Status:  Stable  Pain Score: 0    Pain management: adequate       Mental Status:  Alert and awake   Hydration Status:  Euvolemic   PONV Controlled:  Controlled   Airway Patency:  Patent     Post Op Vitals Reviewed: Yes    No anethesia notable event occurred.    Staff: CRNA           Last Filed PACU Vitals:  Vitals Value Taken Time   Temp 97.3 °F (36.3 °C) 12/11/24 1135   Pulse 68 12/11/24 1154   /72 12/11/24 1154   Resp 19 12/11/24 1154   SpO2 98 % 12/11/24 1154       Modified Lennie:  Activity: 2 (12/11/2024 11:54 AM)  Respiration: 2 (12/11/2024 11:54 AM)  Circulation: 2 (12/11/2024 11:54 AM)  Consciousness: 2 (12/11/2024 11:54 AM)  Oxygen Saturation: 2 (12/11/2024 11:54 AM)  Modified Lennie Score: 10 (12/11/2024 11:54 AM)

## 2024-12-13 PROCEDURE — 88305 TISSUE EXAM BY PATHOLOGIST: CPT | Performed by: PATHOLOGY

## 2024-12-15 ENCOUNTER — RESULTS FOLLOW-UP (OUTPATIENT)
Dept: GASTROENTEROLOGY | Facility: AMBULARY SURGERY CENTER | Age: 59
End: 2024-12-15

## 2024-12-16 ENCOUNTER — APPOINTMENT (OUTPATIENT)
Dept: PHYSICAL THERAPY | Facility: REHABILITATION | Age: 59
End: 2024-12-16
Payer: COMMERCIAL

## 2024-12-23 ENCOUNTER — APPOINTMENT (OUTPATIENT)
Dept: PHYSICAL THERAPY | Facility: REHABILITATION | Age: 59
End: 2024-12-23
Payer: COMMERCIAL

## 2024-12-30 ENCOUNTER — APPOINTMENT (OUTPATIENT)
Dept: PHYSICAL THERAPY | Facility: REHABILITATION | Age: 59
End: 2024-12-30
Payer: COMMERCIAL

## 2025-01-02 ENCOUNTER — APPOINTMENT (OUTPATIENT)
Dept: LAB | Facility: AMBULARY SURGERY CENTER | Age: 60
End: 2025-01-02
Payer: COMMERCIAL

## 2025-01-02 DIAGNOSIS — R79.89 ABNORMAL CBC: ICD-10-CM

## 2025-01-02 DIAGNOSIS — E78.2 MIXED HYPERLIPIDEMIA: ICD-10-CM

## 2025-01-02 LAB
BASOPHILS # BLD AUTO: 0.03 THOUSANDS/ΜL (ref 0–0.1)
BASOPHILS NFR BLD AUTO: 1 % (ref 0–1)
CHOLEST SERPL-MCNC: 180 MG/DL (ref ?–200)
EOSINOPHIL # BLD AUTO: 0.13 THOUSAND/ΜL (ref 0–0.61)
EOSINOPHIL NFR BLD AUTO: 2 % (ref 0–6)
ERYTHROCYTE [DISTWIDTH] IN BLOOD BY AUTOMATED COUNT: 12.5 % (ref 11.6–15.1)
HCT VFR BLD AUTO: 40.4 % (ref 34.8–46.1)
HDLC SERPL-MCNC: 61 MG/DL
HGB BLD-MCNC: 13.3 G/DL (ref 11.5–15.4)
IMM GRANULOCYTES # BLD AUTO: 0.01 THOUSAND/UL (ref 0–0.2)
IMM GRANULOCYTES NFR BLD AUTO: 0 % (ref 0–2)
LDLC SERPL CALC-MCNC: 102 MG/DL (ref 0–100)
LYMPHOCYTES # BLD AUTO: 1.93 THOUSANDS/ΜL (ref 0.6–4.47)
LYMPHOCYTES NFR BLD AUTO: 31 % (ref 14–44)
MCH RBC QN AUTO: 30.3 PG (ref 26.8–34.3)
MCHC RBC AUTO-ENTMCNC: 32.9 G/DL (ref 31.4–37.4)
MCV RBC AUTO: 92 FL (ref 82–98)
MONOCYTES # BLD AUTO: 0.36 THOUSAND/ΜL (ref 0.17–1.22)
MONOCYTES NFR BLD AUTO: 6 % (ref 4–12)
NEUTROPHILS # BLD AUTO: 3.74 THOUSANDS/ΜL (ref 1.85–7.62)
NEUTS SEG NFR BLD AUTO: 60 % (ref 43–75)
NONHDLC SERPL-MCNC: 119 MG/DL
NRBC BLD AUTO-RTO: 0 /100 WBCS
PLATELET # BLD AUTO: 175 THOUSANDS/UL (ref 149–390)
PMV BLD AUTO: 11.5 FL (ref 8.9–12.7)
RBC # BLD AUTO: 4.39 MILLION/UL (ref 3.81–5.12)
TRIGL SERPL-MCNC: 84 MG/DL (ref ?–150)
WBC # BLD AUTO: 6.2 THOUSAND/UL (ref 4.31–10.16)

## 2025-01-02 PROCEDURE — 36415 COLL VENOUS BLD VENIPUNCTURE: CPT

## 2025-01-02 PROCEDURE — 80061 LIPID PANEL: CPT

## 2025-01-02 PROCEDURE — 85025 COMPLETE CBC W/AUTO DIFF WBC: CPT

## 2025-01-13 ENCOUNTER — OFFICE VISIT (OUTPATIENT)
Dept: FAMILY MEDICINE CLINIC | Facility: CLINIC | Age: 60
End: 2025-01-13
Payer: COMMERCIAL

## 2025-01-13 VITALS
OXYGEN SATURATION: 97 % | HEIGHT: 65 IN | BODY MASS INDEX: 27.16 KG/M2 | SYSTOLIC BLOOD PRESSURE: 118 MMHG | TEMPERATURE: 98 F | WEIGHT: 163 LBS | DIASTOLIC BLOOD PRESSURE: 78 MMHG | HEART RATE: 75 BPM

## 2025-01-13 DIAGNOSIS — Z23 NEED FOR DIPHTHERIA-TETANUS-PERTUSSIS (TDAP) VACCINE: ICD-10-CM

## 2025-01-13 DIAGNOSIS — E78.2 MIXED HYPERLIPIDEMIA: ICD-10-CM

## 2025-01-13 DIAGNOSIS — Z00.00 ANNUAL PHYSICAL EXAM: Primary | ICD-10-CM

## 2025-01-13 DIAGNOSIS — Z23 ENCOUNTER FOR IMMUNIZATION: ICD-10-CM

## 2025-01-13 PROCEDURE — 90715 TDAP VACCINE 7 YRS/> IM: CPT | Performed by: NURSE PRACTITIONER

## 2025-01-13 PROCEDURE — 90471 IMMUNIZATION ADMIN: CPT | Performed by: NURSE PRACTITIONER

## 2025-01-13 PROCEDURE — 99396 PREV VISIT EST AGE 40-64: CPT | Performed by: NURSE PRACTITIONER

## 2025-01-13 RX ORDER — ROSUVASTATIN CALCIUM 10 MG/1
10 TABLET, COATED ORAL DAILY
Qty: 90 TABLET | Refills: 3 | Status: SHIPPED | OUTPATIENT
Start: 2025-01-13

## 2025-01-13 NOTE — PROGRESS NOTES
Adult Annual Physical  Name: Abigail Garcia      : 1965      MRN: [id number]  Encounter Provider: TARAS Collier  Encounter Date: 2025   Encounter department: [facility] PRIMARY [facility]    Assessment & Plan  Mixed hyperlipidemia    Orders:    rosuvastatin (CRESTOR) 10 MG tablet; Take 1 tablet (10 mg total) by mouth daily    Annual physical exam           Immunizations and preventive care screenings were discussed with patient today. Appropriate education was printed on patient's after visit summary.    Counseling:  Dental Health: discussed importance of regular tooth brushing, flossing, and dental visits.  Injury prevention: discussed safety/seat belts, safety helmets, smoke detectors, carbon monoxide detectors, and smoking near bedding or upholstery.  Exercise: the importance of regular exercise/physical activity was discussed. Recommend exercise 3-5 times per week for at least 30 minutes.          History of Present Illness     Adult Annual Physical:  Patient presents for annual physical.     Diet and Physical Activity:  - Diet/Nutrition: well balanced diet.  - Exercise: walking.    General Health:  - Sleep: sleeps well.  - Hearing: normal hearing bilateral ears.  - Vision: goes for regular eye exams.  - Dental: regular dental visits.    /GYN Health:  - Follows with GYN: yes.   - Menopause: postmenopausal.     Advanced Care Planning:  - Has an advanced directive?: no    - Has a durable medical POA?: no    - ACP document given to patient?: no      Review of Systems   Constitutional:  Negative for fatigue, fever and unexpected weight change.   HENT:  Negative for trouble swallowing.    Respiratory:  Negative for cough and shortness of breath.    Cardiovascular:  Negative for chest pain, palpitations and leg swelling.   Gastrointestinal:  Negative for abdominal pain and blood in stool.   Endocrine: Negative.    Genitourinary:  Negative for difficulty urinating and hematuria.  "  Musculoskeletal:  Positive for arthralgias.   Skin:  Negative for pallor.   Neurological:  Negative for dizziness, tremors, seizures, syncope and weakness.   Psychiatric/Behavioral:  Negative for confusion and dysphoric mood. The patient is not nervous/anxious.      Medical History Reviewed by provider this encounter:  Tobacco  Allergies  Meds  Problems  Med Hx  Surg Hx  Fam Hx     .  Current Outpatient Medications on File Prior to Visit   Medication Sig Dispense Refill    Cholecalciferol (VITAMIN D3) 1,000 units tablet Take 1,000 Units by mouth daily      [DISCONTINUED] rosuvastatin (CRESTOR) 10 MG tablet Take 1 tablet (10 mg total) by mouth daily 90 tablet 1     No current facility-administered medications on file prior to visit.        Objective   /78 (BP Location: Left arm, Patient Position: Sitting, Cuff Size: Standard)   Pulse 75   Temp 98 °F (36.7 °C) (Temporal)   Ht 5' 5\" (1.651 m)   Wt 73.9 kg (163 lb)   LMP  (LMP Unknown) Comment: LMP 2009  SpO2 97%   BMI 27.12 kg/m²     Physical Exam  Vitals and nursing note reviewed.   Constitutional:       General: She is not in acute distress.     Appearance: Normal appearance.   Cardiovascular:      Rate and Rhythm: Normal rate and regular rhythm.      Pulses: Normal pulses.      Heart sounds: Normal heart sounds.   Pulmonary:      Effort: Pulmonary effort is normal.      Breath sounds: Normal breath sounds.   Musculoskeletal:      Right lower leg: No edema.      Left lower leg: No edema.   Lymphadenopathy:      Cervical: No cervical adenopathy.   Skin:     General: Skin is warm and dry.      Coloration: Skin is not pale.   Neurological:      General: No focal deficit present.      Mental Status: She is alert. Mental status is at baseline.      Motor: No weakness.      Gait: Gait normal.   Psychiatric:         Mood and Affect: Mood normal.         Behavior: Behavior normal.         "

## 2025-01-13 NOTE — PATIENT INSTRUCTIONS
"Patient Education     Routine physical for adults   The Basics   Written by the doctors and editors at [facility]   What is a physical? -- A physical is a routine visit, or \"check-up,\" with your doctor. You might also hear it called a \"wellness visit\" or \"preventive visit.\"  During each visit, the doctor will:   Ask about your physical and mental health   Ask about your habits, behaviors, and lifestyle   Do an exam   Give you vaccines if needed   Talk to you about any medicines you take   Give advice about your health   Answer your questions  Getting regular check-ups is an important part of taking care of your health. It can help your doctor find and treat any problems you have. But it's also important for preventing health problems.  A routine physical is different from a \"sick visit.\" A sick visit is when you see a doctor because of a health concern or problem. Since physicals are scheduled ahead of time, you can think about what you want to ask the doctor.  How often should I get a physical? -- It depends on your age and health. In general, for people age 21 years and older:   If you are younger than 50 years, you might be able to get a physical every 3 years.   If you are 50 years or older, your doctor might recommend a physical every year.  If you have an ongoing health condition, like diabetes or high blood pressure, your doctor will probably want to see you more often.  What happens during a physical? -- In general, each visit will include:   Physical exam - The doctor or nurse will check your height, weight, heart rate, and blood pressure. They will also look at your eyes and ears. They will ask about how you are feeling and whether you have any symptoms that bother you.   Medicines - It's a good idea to bring a list of all the medicines you take to each doctor visit. Your doctor will talk to you about your medicines and answer any questions. Tell them if you are having any side effects that bother you. You " "should also tell them if you are having trouble paying for any of your medicines.   Habits and behaviors - This includes:   Your diet   Your exercise habits   Whether you smoke, drink alcohol, or use drugs   Whether you are sexually active   Whether you feel safe at home  Your doctor will talk to you about things you can do to improve your health and lower your risk of health problems. They will also offer help and support. For example, if you want to quit smoking, they can give you advice and might prescribe medicines. If you want to improve your diet or get more physical activity, they can help you with this, too.   Lab tests, if needed - The tests you get will depend on your age and situation. For example, your doctor might want to check your:   Cholesterol   Blood sugar   Iron level   Vaccines - The recommended vaccines will depend on your age, health, and what vaccines you already had. Vaccines are very important because they can prevent certain serious or deadly infections.   Discussion of screening - \"Screening\" means checking for diseases or other health problems before they cause symptoms. Your doctor can recommend screening based on your age, risk, and preferences. This might include tests to check for:   Cancer, such as breast, prostate, cervical, ovarian, colorectal, prostate, lung, or skin cancer   Sexually transmitted infections, such as chlamydia and gonorrhea   Mental health conditions like depression and anxiety  Your doctor will talk to you about the different types of screening tests. They can help you decide which screenings to have. They can also explain what the results might mean.   Answering questions - The physical is a good time to ask the doctor or nurse questions about your health. If needed, they can refer you to other doctors or specialists, too.  Adults older than 65 years often need other care, too. As you get older, your doctor will talk to you about:   How to prevent falling at " home   Hearing or vision tests   Memory testing   How to take your medicines safely   Making sure that you have the help and support you need at home  All topics are updated as new evidence becomes available and our peer review process is complete.  This topic retrieved from Last 2 Left on: May 02, 2024.  Topic 783449 Version 1.0  Release: 32.4.3 - C32.122  © 2024 UpToDate, Inc. and/or its affiliates. All rights reserved.  Consumer Information Use and Disclaimer   Disclaimer: This generalized information is a limited summary of diagnosis, treatment, and/or medication information. It is not meant to be comprehensive and should be used as a tool to help the user understand and/or assess potential diagnostic and treatment options. It does NOT include all information about conditions, treatments, medications, side effects, or risks that may apply to a specific patient. It is not intended to be medical advice or a substitute for the medical advice, diagnosis, or treatment of a health care provider based on the health care provider's examination and assessment of a patient's specific and unique circumstances. Patients must speak with a health care provider for complete information about their health, medical questions, and treatment options, including any risks or benefits regarding use of medications. This information does not endorse any treatments or medications as safe, effective, or approved for treating a specific patient. UpToDate, Inc. and its affiliates disclaim any warranty or liability relating to this information or the use thereof.The use of this information is governed by the Terms of Use, available at https://www.woltersPayverisuwer.com/en/know/clinical-effectiveness-terms. 2024© UpToDate, Inc. and its affiliates and/or licensors. All rights reserved.  Copyright   © 2024 UpToDate, Inc. and/or its affiliates. All rights reserved.

## 2025-06-30 ENCOUNTER — OFFICE VISIT (OUTPATIENT)
Dept: FAMILY MEDICINE CLINIC | Facility: CLINIC | Age: 60
End: 2025-06-30
Payer: COMMERCIAL

## 2025-06-30 VITALS
OXYGEN SATURATION: 99 % | HEART RATE: 69 BPM | RESPIRATION RATE: 20 BRPM | SYSTOLIC BLOOD PRESSURE: 110 MMHG | WEIGHT: 163.6 LBS | TEMPERATURE: 98.2 F | HEIGHT: 65 IN | DIASTOLIC BLOOD PRESSURE: 80 MMHG | BODY MASS INDEX: 27.26 KG/M2

## 2025-06-30 DIAGNOSIS — M77.8 RIGHT WRIST TENDONITIS: Primary | ICD-10-CM

## 2025-06-30 DIAGNOSIS — R73.03 PREDIABETES: ICD-10-CM

## 2025-06-30 DIAGNOSIS — E53.8 VITAMIN B12 DEFICIENCY: ICD-10-CM

## 2025-06-30 DIAGNOSIS — E55.9 VITAMIN D DEFICIENCY: ICD-10-CM

## 2025-06-30 DIAGNOSIS — E78.2 MIXED HYPERLIPIDEMIA: ICD-10-CM

## 2025-06-30 DIAGNOSIS — R79.89 ABNORMAL CBC: ICD-10-CM

## 2025-06-30 DIAGNOSIS — Z13.0 SCREENING, DEFICIENCY ANEMIA, IRON: ICD-10-CM

## 2025-06-30 DIAGNOSIS — R89.9 ABNORMAL LABORATORY TEST: ICD-10-CM

## 2025-06-30 DIAGNOSIS — R79.89 ELEVATED TSH: ICD-10-CM

## 2025-06-30 PROCEDURE — 99213 OFFICE O/P EST LOW 20 MIN: CPT | Performed by: NURSE PRACTITIONER

## 2025-06-30 NOTE — PROGRESS NOTES
Name: Abigail Garcia      : 1965      MRN: [id number]  Encounter Provider: TARAS Collier  Encounter Date: 2025   [facility]  :  Assessment & Plan  Right wrist tendonitis  Restorative care measures recommended  Ice, Voltaren, gentle exercises as discussed  Provided with contact information for hand surgeon if decide on injection  Also consider hand therapy  Follow-up in 2-4 weeks if refractory         Mixed hyperlipidemia    Orders:  •  Lipid panel; Future  •  Comprehensive metabolic panel  •  TSH, 3rd generation with Free T4 reflex    Abnormal CBC    Orders:  •  CBC and differential; Future    Elevated TSH    Orders:  •  TSH, 3rd generation with Free T4 reflex    Screening, deficiency anemia, iron    Orders:  •  CBC and differential; Future    Prediabetes    Orders:  •  Hemoglobin A1C; Future    Vitamin D deficiency    Orders:  •  Vitamin D 25 hydroxy; Future    Vitamin B12 deficiency    Orders:  •  Vitamin B12; Future    Abnormal laboratory test    Orders:  •  CBC and differential; Future  •  Hemoglobin A1C; Future  •  Comprehensive metabolic panel  •  TSH, 3rd generation with Free T4 reflex  •  Vitamin D 25 hydroxy; Future  •  Vitamin B12; Future      Labs ordered before next visit     History of Present Illness   Wrist Pain   Pain location: right thumb into palm/wrist. This is a new (occured after prolonged leaf blower use with right hand-holding down trigger) problem. The current episode started 1 to 4 weeks ago. The problem occurs intermittently. Associated symptoms include a limited range of motion. Pertinent negatives include no fever or joint swelling. The symptoms are aggravated by activity. She has tried acetaminophen for the symptoms. The treatment provided no relief.   Review of Systems   Constitutional:  Negative for fever.   Musculoskeletal:         Per hpi     Objective   /80 (Patient Position: Sitting, Cuff Size: Standard)   Pulse  "69   Temp 98.2 °F (36.8 °C) (Temporal)   Resp 20   Ht 5' 5\" (1.651 m)   Wt 74.2 kg (163 lb 9.6 oz)   LMP  (LMP Unknown) Comment: LMP 2009  SpO2 99%   BMI 27.22 kg/m²      Physical Exam  Vitals and nursing note reviewed.   Constitutional:       Appearance: Normal appearance.     Musculoskeletal:      Right hand: Decreased strength. Normal sensation. There is no disruption of two-point discrimination. Normal capillary refill. Normal pulse.        Arms:      Neurological:      General: No focal deficit present.      Mental Status: She is alert. Mental status is at baseline.      Sensory: No sensory deficit.     "

## 2025-06-30 NOTE — ASSESSMENT & PLAN NOTE
Orders:    Lipid panel; Future    Comprehensive metabolic panel    TSH, 3rd generation with Free T4 reflex

## 2025-07-07 ENCOUNTER — OFFICE VISIT (OUTPATIENT)
Dept: OBGYN CLINIC | Facility: OTHER | Age: 60
End: 2025-07-07
Payer: COMMERCIAL

## 2025-07-07 ENCOUNTER — APPOINTMENT (OUTPATIENT)
Dept: RADIOLOGY | Facility: OTHER | Age: 60
End: 2025-07-07
Attending: PHYSICIAN ASSISTANT
Payer: COMMERCIAL

## 2025-07-07 VITALS — HEIGHT: 65 IN | BODY MASS INDEX: 27.16 KG/M2 | WEIGHT: 163 LBS

## 2025-07-07 DIAGNOSIS — M72.0 DUPUYTREN DISEASE OF PALM OF RIGHT HAND: ICD-10-CM

## 2025-07-07 DIAGNOSIS — M65.4 RADIAL STYLOID TENOSYNOVITIS (DE QUERVAIN): ICD-10-CM

## 2025-07-07 DIAGNOSIS — M79.644 PAIN OF RIGHT THUMB: ICD-10-CM

## 2025-07-07 DIAGNOSIS — M79.644 PAIN OF RIGHT THUMB: Primary | ICD-10-CM

## 2025-07-07 PROCEDURE — 20550 NJX 1 TENDON SHEATH/LIGAMENT: CPT | Performed by: PHYSICIAN ASSISTANT

## 2025-07-07 PROCEDURE — 99203 OFFICE O/P NEW LOW 30 MIN: CPT | Performed by: PHYSICIAN ASSISTANT

## 2025-07-07 PROCEDURE — 73130 X-RAY EXAM OF HAND: CPT

## 2025-07-07 RX ORDER — TRIAMCINOLONE ACETONIDE 40 MG/ML
40 INJECTION, SUSPENSION INTRA-ARTICULAR; INTRAMUSCULAR
Status: COMPLETED | OUTPATIENT
Start: 2025-07-07 | End: 2025-07-07

## 2025-07-07 RX ORDER — LIDOCAINE HYDROCHLORIDE 10 MG/ML
0.5 INJECTION, SOLUTION INFILTRATION; PERINEURAL
Status: COMPLETED | OUTPATIENT
Start: 2025-07-07 | End: 2025-07-07

## 2025-07-07 RX ADMIN — TRIAMCINOLONE ACETONIDE 40 MG: 40 INJECTION, SUSPENSION INTRA-ARTICULAR; INTRAMUSCULAR at 10:00

## 2025-07-07 RX ADMIN — LIDOCAINE HYDROCHLORIDE 0.5 ML: 10 INJECTION, SOLUTION INFILTRATION; PERINEURAL at 10:00

## 2025-07-07 NOTE — PATIENT INSTRUCTIONS
"Patient Education     Dupuytren's contracture   The Basics   Written by the doctors and editors at [facility]   What is Dupuytren's contracture? -- This is a condition that affects 1 or both hands. It causes the tissue under the skin on the palm to thicken. Over time, this can affect how the fingers move.  Dupuytren's contracture usually gets worse slowly over many years. Most often, it involves the ring finger and little finger.  What are the symptoms of Dupuytren's contracture? -- Early on, the tissue under the skin on the palm of the hand becomes thick. This is usually painless.  Later on, people can have other symptoms that include:   Hard bumps (called \"nodules\") under the skin on the palm   Bands of thick tissue under the skin on the palm   Finger joint stiffness   Trouble straightening 1 or more fingers all of the way (usually the ring and little fingers)  Some people have mild symptoms and can use their hand without difficulty. Others have severe symptoms and trouble using their hand for everyday activities and tasks.  Is there a test for Dupuytren's contracture? -- No. But your doctor or nurse should be able to tell if you have it by learning about your symptoms and doing an exam.  How is Dupuytren's contracture treated? -- It is treated in different ways, depending on how severe the symptoms are. Treatment can't stop the condition from getting worse, but it can improve symptoms.  If your symptoms are mild, there are some things you can do to help keep them from getting worse. You can cushion tool handles and other items you need to  by putting tape on them. You can also use padded gloves when you grab or hold heavy objects.  If your symptoms are severe, your doctor will talk with you about treatments to help your fingers move and straighten. All of the treatments involve removing or breaking apart the thick tissue (or bands of tissue) under the skin.  There are different treatment options. They " include:   Surgery - A doctor can remove or break apart the thick tissue.   A procedure - A doctor can stick a needle in your palm to break apart the thick tissue.   Medicine - You can get a shot of medicine in your palm. The medicine softens and breaks up the thick tissue.  To decide which treatment is right for you, talk with your doctor about the benefits and downsides of each option.  All topics are updated as new evidence becomes available and our peer review process is complete.  This topic retrieved from [employer] on: Apr 24, 2024.  Topic 78700 Version 12.0  Release: 32.3.2 - C32.113  © 2024 UpToDate, Inc. and/or its affiliates. All rights reserved.  picture 1: Dupuytren's contracture     When people have Dupuytren's contracture, the tissue under the skin in the palm of the hand gets thick. Over time, this makes the fingers (usually the ring and little fingers) stiff and keeps them from straightening all the way.  Reproduced with permission from: Rico RP, Glenna RW, Goldberg VM. Soft Tissue Rheumatic Pain: Recognition, Management, Prevention, 3rd ed, David&Garcia, [place] 1996. Copyright © 1996 Sheri David&Garcia.  http://www.lww.com   Graphic 38890 Version 6.0  Consumer Information Use and Disclaimer   Disclaimer: This generalized information is a limited summary of diagnosis, treatment, and/or medication information. It is not meant to be comprehensive and should be used as a tool to help the user understand and/or assess potential diagnostic and treatment options. It does NOT include all information about conditions, treatments, medications, side effects, or risks that may apply to a specific patient. It is not intended to be medical advice or a substitute for the medical advice, diagnosis, or treatment of a health care provider based on the health care provider's examination and assessment of a patient's specific and unique circumstances. Patients must speak with a health care provider  "for complete information about their health, medical questions, and treatment options, including any risks or benefits regarding use of medications. This information does not endorse any treatments or medications as safe, effective, or approved for treating a specific patient. UpToDate, Inc. and its affiliates disclaim any warranty or liability relating to this information or the use thereof.The use of this information is governed by the Terms of Use, available at https://www.YR.MRKTer.com/en/know/clinical-effectiveness-terms. 2024© UpToDate, Inc. and its affiliates and/or licensors. All rights reserved.  Copyright   © 2024 UpToDate, Inc. and/or its affiliates. All rights reserved.  Patient Education     de Quervain tendinopathy   The Basics   Written by the doctors and editors at [employer]   What is de Quervain tendinopathy? -- de Quervain tendinopathy is a condition that causes pain in the thumb and wrist. It is caused by a problem with a tendon. Tendons are strong bands of tissue that connect muscles to bones. de Quervain tendinopathy is sometimes called \"de Quervain tenosynovitis.\"  de Quervain tendinopathy involves tendons that connect the forearm muscles to the thumb. These tendons and the covering around them get inflamed. This causes symptoms.  Most often, de Quervain tendinopathy happens when people use their wrist and thumb too much in certain ways. This includes gripping or grabbing objects (like a tool, golf club, or tennis racket) over and over. But, it can also happen to people for no obvious reason.  What are the symptoms of de Quervain tendinopathy? -- Symptoms include:   Pain in the wrist or thumb   Trouble gripping objects   Swelling in the wrist  Will I need tests? -- Probably not. Your doctor can usually tell if you have de Quervain tendinopathy by learning about your symptoms and doing an exam. During the exam, they will carefully check your thumb, hand, and wrist.  How is de Quervain " "tendinopathy treated? -- Treatment includes:   Resting your thumb - To avoid moving your thumb, you can wear a splint made for keeping the thumb still.   Ice - You can put a cold gel pack, bag of ice, or bag of frozen vegetables on the painful or swollen area every 4 to 6 hours, for 15 minutes each time.   Pain-relieving medicines called \"NSAIDs\" - NSAIDs are a large group of medicines that includes ibuprofen (sample brand names: Advil, Motrin) and naproxen (sample brand name: Aleve).   Exercises - After your symptoms improve, your doctor or nurse will show you exercises to help your wrist and thumb move more easily.  If your symptoms don't get better with treatment, your doctor might recommend other treatments. These can include:   Getting a shot of a steroid medicine around the tendon in your wrist - This can help with pain.   Surgery to cut or loosen the covering around the tendon  All topics are updated as new evidence becomes available and our peer review process is complete.  This topic retrieved from [employer] on: Feb 26, 2024.  Topic 31949 Version 12.0  Release: 32.2.4 - C32.56  © 2024 UpToDate, Inc. and/or its affiliates. All rights reserved.  Consumer Information Use and Disclaimer   Disclaimer: This generalized information is a limited summary of diagnosis, treatment, and/or medication information. It is not meant to be comprehensive and should be used as a tool to help the user understand and/or assess potential diagnostic and treatment options. It does NOT include all information about conditions, treatments, medications, side effects, or risks that may apply to a specific patient. It is not intended to be medical advice or a substitute for the medical advice, diagnosis, or treatment of a health care provider based on the health care provider's examination and assessment of a patient's specific and unique circumstances. Patients must speak with a health care provider for complete information about their " health, medical questions, and treatment options, including any risks or benefits regarding use of medications. This information does not endorse any treatments or medications as safe, effective, or approved for treating a specific patient. UpToDate, Inc. and its affiliates disclaim any warranty or liability relating to this information or the use thereof.The use of this information is governed by the Terms of Use, available at https://www.[employer].com/en/know/clinical-effectiveness-terms. 2024© UpToDate, Inc. and its affiliates and/or licensors. All rights reserved.  Copyright   © 2024 UpToDate, Inc. and/or its affiliates. All rights reserved.

## 2025-07-07 NOTE — PROGRESS NOTES
Orthopaedic Surgery - Office Note  Abigail Garcia (59 y.o. female)   : 1965   MRN: [id number]  Encounter Date: 2025    Chief Complaint   Patient presents with    Right Wrist - Pain         Assessment & Plan  Pain of right thumb    Orders:    XR hand 3+ vw right; Future    Ambulatory Referral to Occupational Therapy; Future    Hand/upper extremity injection: R extensor compartment 1    lidocaine (XYLOCAINE) 1 % injection 0.5 mL    triamcinolone acetonide (Kenalog-40) 40 mg/mL injection 40 mg    Radial styloid tenosynovitis (de quervain)  The diagnosis as well as treatment options were reviewed with the patient in the office today.  I would recommend icing the wrist 20 minutes on 1 hour off 3 times a day.  Patient will be provided a thumb spica brace to be worn at night and then during the day as desired for symptomatic activities.  Patient will start an oral anti-inflammatory such as Aleve 1 tablet twice daily with food stopping and calling if any stomach upset occurs.  In addition patient will consider topical anti-inflammatory gel such as Voltaren/diclofenac to be applied as directed on the over-the-counter product.  Patient will be started in formal occupational therapy for evaluation and treatment.    The risks and benefits of a cortisone injection were reviewed.  Shared decision making was utilized.  All question and concerns were reviewed and patient elected to undergo the injection, tolerating it well.  Patient did note significant decrease in pain symptoms postinjection and prior to leaving the exam room.    Patient will return for repeat evaluation in 6 weeks, earlier if symptoms are not improving with conservative care.  All question and concerns were answered in the office today.     Orders:    Ambulatory Referral to Occupational Therapy; Future    Hand/upper extremity injection: R extensor compartment 1    lidocaine (XYLOCAINE) 1 % injection 0.5 mL    triamcinolone acetonide (Kenalog-40) 40 mg/mL  "injection 40 mg    Dupuytren disease of palm of right hand  The diagnosis as well as treatment options were reviewed with the patient in the office today.  As she is currently asymptomatic no further treatment is required.  She was educated on the natural progression of this disease and was advised if it becomes symptomatic with loss of motion she should follow-up with a hand surgeon to discuss enzyme injections.  She was provided additional patient education in the after visit summary regarding this diagnosis.  All question and concerns were answered in the office today          Return for Recheck in 6 weeks.        History of Present Illness  This is a new patient with a new problem.  Patient reports a pain at the base of her thumb in the radial styloid region that started after prolonged period of holding her trigger down using a leaf blower.  This has been bothering her since May.  she is right-hand dominant.  No previous problems reported.  She is also hoping to bottlefeed a  infant the past 3 months.  She has been using a thumb spica brace that does help decrease her symptoms.  Tylenol has helped with pain but not change the symptoms.  No paresthesias are reported.  She also reports a family history of Dupuytren's and notices a thickening of her right ring finger at the palm without any loss of motion or pain.      Review of Systems  Pertinent items are noted in HPI.  All other systems were reviewed and are negative.    Physical Exam  Ht 5' 5\" (1.651 m)   Wt 73.9 kg (163 lb)   LMP  (LMP Unknown) Comment: LMP 2009  BMI 27.12 kg/m²   Cons: Appears well.  No apparent distress.  Psych: Alert. Oriented x3.  Mood and affect normal.    Right hand is without skin breakdown lesion or signs of infection.  She has a Dupuytren thickening of the palmar cord of the ring finger without contracture.  Right thumb is without active triggering in the office today.  She has mild tenderness at the A1 pulley.  She has a " "positive Finkelstein's test and is tender to the radial compartment.  She has a negative CMC grind test.  She has no tenderness in the anatomical snuffbox.  She is nontender throughout the rest of the hand and wrist.   strength and pinch strength are decreased with pain localized to the base of the thumb.  She has a negative Tinel's and negative Phalen's.  Elbow exam is unremarkable.    Independent review of x-rays performed in the office today of the right hand show no acute fractures or dislocations.  Very mild CMC joint degenerative changes are seen.  X-rays were reviewed from orthopedic standpoint will await official radiologist interpretation.          Studies Reviewed  PCP notes from 6/30/2025 were reviewed for today's visit.    Hand/upper extremity injection: R extensor compartment 1    Universal Protocol:  Consent: Verbal consent obtained  Risks and benefits: risks, benefits and alternatives were discussed  Consent given by: patient  Time out: Immediately prior to procedure a \"time out\" was called to verify the correct patient, procedure, equipment, support staff and site/side marked as required.  Patient understanding: patient states understanding of the procedure being performed  Patient consent: the patient's understanding of the procedure matches consent given  Relevant documents: relevant documents present and verified  Test results: test results available and properly labeled  Site marked: the operative site was marked  Radiology Images displayed and confirmed. If images not available, report reviewed: imaging studies available  Patient identity confirmed: verbally with patient  Supporting Documentation  Indications: pain and tendon swelling   Procedure Details  Condition:de Quervain's tenosynovitis Site: R extensor compartment 1   Needle size: 22 G  Ultrasound guidance: no  Medications administered: 0.5 mL lidocaine 1 %; 40 mg triamcinolone acetonide 40 mg/mL  Patient tolerance: patient tolerated " "the procedure well with no immediate complications  Dressing:  Sterile dressing applied           Medical, Surgical, Family, and Social History  The patient's medical history, family history, and social history, were reviewed and updated as appropriate.    Past Medical History[1]    Past Surgical History[2]    Family History[3]    Social History     Occupational History    Not on file   Tobacco Use    Smoking status: Never    Smokeless tobacco: Never   Vaping Use    Vaping status: Never Used   Substance and Sexual Activity    Alcohol use: Yes     Comment: ocassional -special occasion only 1/year    Drug use: Never    Sexual activity: Not on file     Comment: defer       Allergies[4]    Current Medications[5]      David Gerard PA-C         [1]   Past Medical History:  Diagnosis Date    Allergic contact dermatitis due to plants, except food 04/26/2021    Allergic rhinitis     Back pain     \"bilat rib cage discomfort -hx of hairline T 8 fracture\"    BMI 25.0-25.9,adult 11/23/2020    Cancer (HCC) 2008    Claustrophobia     \"anything too close to face\"    Ear problems     Environmental allergies     Fatty liver     Fracture of T8 vertebra (HCC)     hairline fracture    Gastric polyps     Gastritis     GERD (gastroesophageal reflux disease)     last egd 2/2016    History of breast cancer 2008    right breast was on tamoxifen and was done in 3/2014    History of cancer chemotherapy 2009    History of mammogram 02/01/2018    History of radiation therapy     HL (hearing loss)     Hyperlipidemia     Immunization deficiency 05/27/2020    Impaired fasting glucose 05/27/2020    Limb alert care status     No BP/IV Right Arm    Neck muscle spasm     to [facility] ED 10/21/23 --to see Dr Azevedo 10/25/23 and will let him know    Prediabetes 11/23/2020    Tinnitus     Upper abdominal pain     saw her doctor 9/25/23 for \"abdominal cramping\"-told related probably  to irritation-none noted today per pt-instructed to call doctor if s/s " would return    Vitamin D deficiency 05/27/2020    15    Wears glasses    [2]   Past Surgical History:  Procedure Laterality Date    BREAST LUMPECTOMY Right 01/01/2008    COLONOSCOPY  01/01/2014    COLONOSCOPY      GA XCAPSL CTRC RMVL INSJ IO LENS PROSTH W/O ECP Left 10/2/2023    Procedure: EXTRACTION EXTRACAPSULAR CATARACT PHACO INTRAOCULAR LENS (IOL);  Surgeon: Blake Azevedo MD;  Location: [facility] MAIN OR;  Service: Ophthalmology    GA XCAPSL CTRC RMVL INSJ IO LENS PROSTH W/O ECP Right 10/30/2023    Procedure: EXTRACTION EXTRACAPSULAR CATARACT PHACO INTRAOCULAR LENS (IOL);  Surgeon: Blake Azevedo MD;  Location: [facility] MAIN OR;  Service: Ophthalmology    UPPER GASTROINTESTINAL ENDOSCOPY     [3]   Family History  Problem Relation Name Age of Onset    Hypertension Mother Viviana Mcguire     Heart disease Mother Viviana Mcguire     Hyperlipidemia Mother Viviana Mcguire     Skin cancer Father Cj Mcguire         basal call carcinoma     Hyperlipidemia Father Cj Mcguire     Liver disease Sister      Heart disease Brother Ted Mcguire     Breast cancer Maternal Aunt      Esophageal cancer Maternal Uncle      Esophageal cancer Paternal Aunt     [4]   Allergies  Allergen Reactions    Medical Tape Rash     Sometimes adhesive   [5]   Current Outpatient Medications:     Cholecalciferol (VITAMIN D3) 1,000 units tablet, Take 1,000 Units by mouth in the morning., Disp: , Rfl:     rosuvastatin (CRESTOR) 10 MG tablet, Take 1 tablet (10 mg total) by mouth daily, Disp: 90 tablet, Rfl: 3  No current facility-administered medications for this visit.

## 2025-07-11 DIAGNOSIS — Z00.6 ENCOUNTER FOR EXAMINATION FOR NORMAL COMPARISON OR CONTROL IN CLINICAL RESEARCH PROGRAM: ICD-10-CM

## 2025-08-15 ENCOUNTER — APPOINTMENT (OUTPATIENT)
Dept: LAB | Facility: AMBULARY SURGERY CENTER | Age: 60
End: 2025-08-15
Payer: COMMERCIAL

## 2025-08-26 PROBLEM — R21 RASH AND NONSPECIFIC SKIN ERUPTION: Status: RESOLVED | Noted: 2021-04-26 | Resolved: 2025-08-26

## 2025-08-26 PROBLEM — L82.0 SEBORRHEIC KERATOSIS, INFLAMED: Status: RESOLVED | Noted: 2022-03-30 | Resolved: 2025-08-26

## 2025-08-26 PROBLEM — R10.13 EPIGASTRIC PAIN: Status: RESOLVED | Noted: 2022-12-07 | Resolved: 2025-08-26

## 2025-08-26 PROBLEM — M79.18 MYOFASCIAL PAIN: Status: RESOLVED | Noted: 2024-09-16 | Resolved: 2025-08-26

## 2025-08-26 PROBLEM — L23.7 ALLERGIC CONTACT DERMATITIS DUE TO PLANTS, EXCEPT FOOD: Status: RESOLVED | Noted: 2021-04-26 | Resolved: 2025-08-26

## (undated) DEVICE — EYE PACK CUSTOM -FINNEGAN

## (undated) DEVICE — THE MONARCH® "D" CARTRIDGE IS A SINGLE-USE POLYPROPYLENE CARTRIDGE FOR POSTERIOR CHAMBER IOL DELIVERY: Brand: MONARCH® III

## (undated) DEVICE — AIR INJECT CANNULA 27GA: Brand: OPHTHALMIC CANNULA

## (undated) DEVICE — MICROSURGICAL INSTRUMENT IRR. CYSTITOME 25GA STRAIGHT-REVERSE CUTTING: Brand: ALCON

## (undated) DEVICE — CLEARCUT® SLIT KNIFE INTREPID MICRO-COAXIAL SYSTEM 2.4 SB: Brand: CLEARCUT®; INTREPID

## (undated) DEVICE — GLOVE SRG BIOGEL 7.5

## (undated) DEVICE — INTREPID® TRANSFORMER IA HP: Brand: INTREPID®

## (undated) DEVICE — TURBOSONICS MICROSMOOTH MICROTIP PARTS KIT: Brand: TURBOSONICS, MICROSMOOTH, MICROTIP, ALCON

## (undated) DEVICE — ACTIVE FMS W/ INTREPID* ULTRA SLEEVES, 0.9MM 45° ABS* INTREPID* BALANCED TIP: Brand: ALCON

## (undated) DEVICE — SUT ETHILON 10-0 CS160-6 12 IN 9000G

## (undated) DEVICE — CENTURION VISION SYSTEM FMS

## (undated) DEVICE — B-H IRRIGATING CAN 19GA FLAT ANGLED 8MM: Brand: OPHTHALMIC CANNULA